# Patient Record
Sex: FEMALE | Race: WHITE | Employment: FULL TIME | URBAN - METROPOLITAN AREA
[De-identification: names, ages, dates, MRNs, and addresses within clinical notes are randomized per-mention and may not be internally consistent; named-entity substitution may affect disease eponyms.]

---

## 2017-02-06 DIAGNOSIS — I25.798 CORONARY ARTERY DISEASE INVOLVING OTHER CORONARY ARTERY BYPASS GRAFT WITH OTHER FORMS OF ANGINA PECTORIS (H): Primary | ICD-10-CM

## 2017-02-06 NOTE — TELEPHONE ENCOUNTER
metoprolol (TOPROL-XL) 50 MG      Last Written Prescription Date: 8/26/16  Last Fill Quantity: 90, # refills: 1    Last Office Visit with [facility], [facility] or [facility] prescribing provider:  8/4/16   Future Office Visit:        BP Readings from Last 3 Encounters:   08/04/16 110/72   06/28/16 110/60   04/05/16 100/60

## 2017-02-07 RX ORDER — METOPROLOL SUCCINATE 50 MG/1
50 TABLET, EXTENDED RELEASE ORAL DAILY
Qty: 90 TABLET | Refills: 1 | Status: SHIPPED | OUTPATIENT
Start: 2017-02-07 | End: 2017-04-06

## 2017-02-07 NOTE — TELEPHONE ENCOUNTER
Refill approved through [facility] protocol.  Donya Sheffield RN  [facility]  [phone]

## 2017-02-17 ENCOUNTER — THERAPY VISIT (OUTPATIENT)
Dept: PHYSICAL THERAPY | Facility: CLINIC | Age: 57
End: 2017-02-17
Payer: MEDICAID

## 2017-02-17 DIAGNOSIS — M25.562 ACUTE PAIN OF LEFT KNEE: ICD-10-CM

## 2017-02-17 DIAGNOSIS — M25.572 ACUTE LEFT ANKLE PAIN: ICD-10-CM

## 2017-02-17 DIAGNOSIS — R60.0 LOCALIZED EDEMA: Primary | ICD-10-CM

## 2017-02-17 PROCEDURE — 97110 THERAPEUTIC EXERCISES: CPT | Mod: GP | Performed by: PHYSICAL THERAPIST

## 2017-02-17 PROCEDURE — 97016 VASOPNEUMATIC DEVICE THERAPY: CPT | Mod: GP | Performed by: PHYSICAL THERAPIST

## 2017-02-17 PROCEDURE — 97140 MANUAL THERAPY 1/> REGIONS: CPT | Mod: GP | Performed by: PHYSICAL THERAPIST

## 2017-02-17 NOTE — MR AVS SNAPSHOT
"              After Visit Summary   2017    Carmen Nuñez    MRN: [id number]           Patient Information     Date Of Birth          1960        Visit Information        Provider Department      2017 3:30 PM Delfina Moses PT [facility]        Today's Diagnoses     Localized edema    -  1    Acute left ankle pain        Acute pain of left knee           Follow-ups after your visit        Who to contact     If you have questions or need follow up information about today's clinic visit or your schedule please contact [facility] directly at [phone].  Normal or non-critical lab and imaging results will be communicated to you by [employer], letter or phone within 4 business days after the clinic has received the results. If you do not hear from us within 7 days, please contact the clinic through [employer] or phone. If you have a critical or abnormal lab result, we will notify you by phone as soon as possible.  Submit refill requests through [employer] or call your pharmacy and they will forward the refill request to us. Please allow 3 business days for your refill to be completed.          Additional Information About Your Visit        MyChart Information     [employer] lets you send messages to your doctor, view your test results, renew your prescriptions, schedule appointments and more. To sign up, go to [email or web address] . Click on \"Log in\" on the left side of the screen, which will take you to the Welcome page. Then click on \"Sign up Now\" on the right side of the page.     You will be asked to enter the access code listed below, as well as some personal information. Please follow the directions to create your username and password.     Your access code is: [email or web address]  Expires: 2017  5:03 PM     Your access code will  in 90 days. If you need help or a new code, please call " your [facility] clinic or [phone].        Care EveryWhere ID     This is your Care EveryWhere ID. This could be used by other organizations to access your [facility] medical records  [id number]         Blood Pressure from Last 3 Encounters:   08/04/16 110/72   06/28/16 110/60   04/05/16 100/60    Weight from Last 3 Encounters:   08/04/16 94.8 kg (209 lb)   06/28/16 94.5 kg (208 lb 6.4 oz)   04/05/16 96.1 kg (211 lb 12.8 oz)              We Performed the Following     Manual Ther Tech, 1+Regions, EA 15 min     Therapeutic Exercises     Vasopneumatic Device        Primary Care Provider Office Phone # Fax #    Jackelyn Sims [phone]       [facility] [address] [place] [address] [address]  [place] [address]        Thank you!     Thank you for choosing [facility]  for your care. Our goal is always to provide you with excellent care. Hearing back from our patients is one way we can continue to improve our services. Please take a few minutes to complete the written survey that you may receive in the mail after your visit with us. Thank you!             Your Updated Medication List - Protect others around you: Learn how to safely use, store and throw away your medicines at www.disposemymeds.org.          This list is accurate as of: 2/17/17  5:03 PM.  Always use your most recent med list.                   Brand Name Dispense Instructions for use    amitriptyline 10 MG tablet    ELAVIL    90 tablet    Take 2 tablets by mouth At Bedtime.       aspirin 81 MG tablet      Take 81 mg by mouth daily       atorvastatin 20 MG tablet    LIPITOR    90 tablet    Take 1 tablet (20 mg) by mouth daily       calcium 500 MG Chew      1 per day       citalopram 40 MG tablet    celeXA    30 tablet    Take 1 tablet (40 mg) by mouth At Bedtime       clonazePAM 2 MG tablet    klonoPIN    60 tablet    Take 2 mg by mouth At Bedtime       desonide 0.05 % cream     DESOWEN    15 g    Apply sparingly to affected area three times daily for 14 days.       metoprolol 50 MG 24 hr tablet    TOPROL-XL    90 tablet    Take 1 tablet (50 mg) by mouth daily       MULTIVITAMIN TABS   OR      one tablet daily       nitroglycerin 0.4 MG sublingual tablet    NITROSTAT    25 tablet    Place 1 tablet (0.4 mg) under the tongue every 5 minutes as needed for chest pain       OMEGA-3 FISH OIL PO

## 2017-02-17 NOTE — LETTER
DEPARTMENT OF HEALTH AND HUMAN SERVICES  CENTERS FOR MEDICARE & MEDICAID SERVICES    PLAN/UPDATED PLAN OF PROGRESS FOR OUTPATIENT REHABILITATION    PATIENTS NAME:  Carmen Nuñez   : 1960  PROVIDER NUMPBER:    [id number]    HICN: [id number]    PROVIDER NAME: [facility]    MEDICAL RECORD NUMBER: [id number]     START OF CARE DATE:    10/14/16  TYPE:  PT    PRIMARY/TREATMENT DIAGNOSIS: (Pertinent Medical Diagnosis)     Acute left ankle pain  Acute pain of left knee  Localized edema    VISITS FROM START OF CARE:  Rxs Used: 10     PROGRESS  REPORT    Progress reporting period is from SOC to 2017.       SUBJECTIVE  CC: left knee and ankle pain onset 16.  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 L ankle medial pain, L knee anteromedial pain. Ankle had been hurting before this day but pain worsened after. No history of popping/clicking or feelings of knee giving way. Denies numbness/tingling. No pain into hip or back. Aggravated by walking (pain 7/10 after 2 blocks) or stairs (5/10 over 2 flights). Pain relieved with rest and ibuprofen. .    and reported as 3/10.          Special tests:  X-ray.      General health as reported by patient is fair.  Pertinent medical history includes:  Overweight, chemical dependency, heart problems, high blood pressure, depression, anemia and migraines.        Current occupation is  at [employer]-- involves prolonged standing and sitting, walking, carrying up to 25lb..      Past PT experience: Several episodes at [facility] EP in past  Recreational Activities/ Exercise:  No current routine  Subjective: Donya returns because of the L medial knee pain .  It mainly hurts with squatting and going up and down stairs.    Current pain level is 3/10  .     Initial Pain level: 7/10.   Changes in function:  Yes  (See Goal flowsheet attached for changes in current functional level)  Adverse reaction to treatment or activity: None  PATIENTS NAME:  Carmen Nuñez   : 1960              OBJECTIVE    Objective: Pain with squatting 3/10 and instruction given on trying to put weight at the heel with going up stairs to see if that helps to control the pain.     KNEE:  Lumbar Screen:   Fwd flx: full and nonpainful  Extension: full and nonpainful  Side bending: full and nonpainful  Rotation: full and nonpainful  Gait: Antalgic- Dec stance time L  Functional:   - Squat: Poor control, dec depth, ant weight shift. Pain 5/10.  - Balance: <5 sec SLS EO bilat    Swelling: Mild edema L anteromedial knee        AROM: (* indicates patient's pain)    R L     Hyperextension   4 5     Extension   0 0     Flexion   126 128*         Patellar tracking: Slight dec medial glide L vs. R. Normal patellar alignment bilat. No pain with mobility testing.                                PATIENTS NAME:  Carmen Nuñez   : 1960      Special tests:   L R   Sweep Test -    Anterior Drawer -    Lever Test     Posterior Drawer -    Lachman's -    Valgus 0 degrees -    Valgus 30 degrees -    Varus 0 degrees -    Varus 30 degrees -    Mich's +    Thessaley's     Lateral Compression -    Patellar Compression -    SLR -    Slump     Fat pad compression +        Palpation: TTP L anteromedial and anterolateral knee.  ANKLE:  ROM: Symmetric OKC DF and PF AROM/ nonpainful.   MMT: Ant tib: R 5/5, L4+/5  ED: R 5/5, L5/5  Post tib: R 4+/5, L 4/5*  Peroneals: R 5/5, L 5/5  Palpation: TTP L post tib tendon distal to med malleoli. No thickening of tendon palpable. Neg pain with palpation med malleoli or deltoid ligament.     ASSESSMENT/PLAN  Updated problem list and treatment plan: Diagnosis 1:  L medial knee pain  Pain -  self management, education and home program  Decreased strength - therapeutic exercise and therapeutic activities  Impaired muscle  "performance - neuro re-education  Decreased function - therapeutic activities  STG/LTGs have been met or progress has been made towards goals:  Yes (See Goal flow sheet completed today.)  Assessment of Progress: The patient's condition has exacerbated.  Self Management Plans:  Patient has been instructed in a home treatment program.  I have re-evaluated this patient and find that the nature, scope, duration and intensity of the therapy is appropriate for the medical condition of the patient.  Carmen continues to require the following intervention to meet STG and LTG's:  PT      PATIENTS NAME:  Carmen Nuñez   : 1960          Recommendations:  This patient would benefit from continued therapy.     Frequency:  1 X week, once daily  Duration:  for 4 weeks      Caregiver Signature/Credentials _____________________________ Date ________       Treating Provider: Naomi Moses, PT     I have reviewed and certified the need for these services and plan of treatment while under my care.        PHYSICIAN'S SIGNATURE:   _________________________________________  Date___________   Camryn Jett MD     Certification period:    17 to 17      Functional Level Progress Report: Please see attached \"Goal Flow sheet for Functional level.\"    ____X____ Continue Services or       ________ DC Services                Service dates: From  10/14/16 to present                         "

## 2017-03-30 NOTE — PROGRESS NOTES
Subjective:    HPI                    Objective:    System    Physical Exam    General     ROS    Assessment/Plan:      PROGRESS  REPORT    Progress reporting period is from SOC to 2-.       SUBJECTIVE  Subjective: Donya returns because of the L medial knee pain .  It mainly hurts with squatting and going up and down stairs.    Current pain level is 3/10  .     Initial Pain level: 7/10.   Changes in function:  Yes (See Goal flowsheet attached for changes in current functional level)  Adverse reaction to treatment or activity: None    OBJECTIVE    Objective: Pain with squatting 3/10 and instruction given on trying to put weight at the heel with going up stairs to see if that helps to control the pain.    ASSESSMENT/PLAN  Updated problem list and treatment plan: Diagnosis 1:  L medial knee pain  Pain -  self management, education and home program  Decreased strength - therapeutic exercise and therapeutic activities  Impaired muscle performance - neuro re-education  Decreased function - therapeutic activities  STG/LTGs have been met or progress has been made towards goals:  Yes (See Goal flow sheet completed today.)  Assessment of Progress: The patient's condition has exacerbated.  Self Management Plans:  Patient has been instructed in a home treatment program.  I have re-evaluated this patient and find that the nature, scope, duration and intensity of the therapy is appropriate for the medical condition of the patient.  Carmen continues to require the following intervention to meet STG and LTG's:  PT    Recommendations:  This patient would benefit from continued therapy.     Frequency:  1 X week, once daily  Duration:  for 4 weeks        Please refer to the daily flowsheet for treatment today, total treatment time and time spent performing 1:1 timed codes.

## 2017-03-30 NOTE — PROGRESS NOTES
Subjective:    HPI                    Objective:    System    Physical Exam    General     ROS    Assessment/Plan:      PROGRESS  REPORT    Progress reporting period is from 10/2017  to 2-.       SUBJECTIVE  Subjective: Donya returns because of the L medial knee pain .  It mainly hurts with squatting and going up and down stairs.    Current pain level is 3/10  .     Initial Pain level: 7/10.   Changes in function:  Yes (See Goal flowsheet attached for changes in current functional level)  Adverse reaction to treatment or activity: None    OBJECTIVE    Objective: Pain with squatting 3/10 and instruction given on trying to put weight at the heel with going up stairs to see if that helps to control the pain.    [facility] Initial Evaluation    Subjective:            CC: left knee and ankle pain onset 2 months prior in August 2016; pt works at [employer] 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 L ankle medial pain, L knee anteromedial pain. Ankle had been hurting before this day but pain worsened after. No history of popping/clicking or feelings of knee giving way. Denies numbness/tingling. No pain into hip or back. Aggravated by walking (pain 7/10 after 2 blocks) or stairs (5/10 over 2 flights). Pain relieved with rest and ibuprofen. .          and reported as 3/10.          Special tests:  X-ray.      General health as reported by patient is fair.  Pertinent medical history includes:  Overweight, chemical dependency, heart problems, high blood pressure, depression, anemia and migraines.        Current occupation is  at [employer]-- involves prolonged standing and sitting, walking, carrying up to 25lb..                 Past PT experience: Several episodes at [facility] in past    Recreational Activities/ Exercise:  No current routine.            Objective:    System    Physical  Exam    General     ROS   KNEE:    Lumbar Screen:   Fwd flx: full and nonpainful  Extension: full and nonpainful  Side bending: full and nonpainful  Rotation: full and nonpainful    Gait: Antalgic- Dec stance time L    Functional:   - Squat: Poor control, dec depth, ant weight shift. Pain 5/10.  - Balance: <5 sec SLS EO bilat    Swelling: Mild edema L anteromedial knee        AROM: (* indicates patient's pain)    R L     Hyperextension   4 5     Extension   0 0     Flexion   126 128*         Patellar tracking: Slight dec medial glide L vs. R. Normal patellar alignment bilat. No pain with mobility testing.    Special tests:   L R   Sweep Test -    Anterior Drawer -    Lever Test     Posterior Drawer -    Lachman's -    Valgus 0 degrees -    Valgus 30 degrees -    Varus 0 degrees -    Varus 30 degrees -    Mich's +    Thessaley's     Lateral Compression -    Patellar Compression -    SLR -    Slump     Fat pad compression +        Palpation: TTP L anteromedial and anterolateral knee.    ANKLE:  ROM: Symmetric OKC DF and PF AROM/ nonpainful.     MMT: Ant tib: R 5/5, L4+/5  ED: R 5/5, L5/5  Post tib: R 4+/5, L 4/5*  Peroneals: R 5/5, L 5/5    Palpation: TTP L post tib tendon distal to med malleoli. No thickening of tendon palpable. Neg pain with palpation med malleoli or deltoid ligament.           ASSESSMENT/PLAN  Updated problem list and treatment plan: Diagnosis 1:  L medial knee pain  Pain -  self management, education and home program  Decreased strength - therapeutic exercise and therapeutic activities  Impaired muscle performance - neuro re-education  Decreased function - therapeutic activities  STG/LTGs have been met or progress has been made towards goals:  Yes (See Goal flow sheet completed today.)  Assessment of Progress: The patient's condition has exacerbated.  Self Management Plans:  Patient has been instructed in a home treatment program.  I have re-evaluated this patient and find that the nature,  scope, duration and intensity of the therapy is appropriate for the medical condition of the patient.  Carmen continues to require the following intervention to meet STG and LTG's:  PT    Recommendations:  This patient would benefit from continued therapy.     Frequency:  1 X week, once daily  Duration:  for 4 weeks        Please refer to the daily flowsheet for treatment today, total treatment time and time spent performing 1:1 timed codes.

## 2017-03-31 ENCOUNTER — THERAPY VISIT (OUTPATIENT)
Dept: PHYSICAL THERAPY | Facility: CLINIC | Age: 57
End: 2017-03-31
Payer: MEDICAID

## 2017-03-31 DIAGNOSIS — R60.0 LOCALIZED EDEMA: ICD-10-CM

## 2017-03-31 DIAGNOSIS — M25.562 ACUTE PAIN OF LEFT KNEE: ICD-10-CM

## 2017-03-31 DIAGNOSIS — M25.572 ACUTE LEFT ANKLE PAIN: ICD-10-CM

## 2017-03-31 PROCEDURE — 97140 MANUAL THERAPY 1/> REGIONS: CPT | Mod: GP | Performed by: PHYSICAL THERAPIST

## 2017-03-31 PROCEDURE — 97110 THERAPEUTIC EXERCISES: CPT | Mod: GP | Performed by: PHYSICAL THERAPIST

## 2017-04-04 DIAGNOSIS — E78.5 HYPERLIPIDEMIA LDL GOAL <70: ICD-10-CM

## 2017-04-04 NOTE — TELEPHONE ENCOUNTER
atorvastatin     Last Written Prescription Date: 12/26/16  Last Fill Quantity: 90, # refills: 0  Last Office Visit with [facility] or [facility] prescribing provider: 8/4/16       Lab Results   Component Value Date    CHOL 151 12/31/2015     Lab Results   Component Value Date    HDL 30 12/31/2015     Lab Results   Component Value Date    LDL 68 12/31/2015     Lab Results   Component Value Date    TRIG 267 12/31/2015     Lab Results   Component Value Date    CHOLHDLRATIO 4.9 01/07/2015

## 2017-04-05 RX ORDER — ATORVASTATIN CALCIUM 20 MG/1
TABLET, FILM COATED ORAL
Qty: 30 TABLET | Refills: 0 | OUTPATIENT
Start: 2017-04-05

## 2017-04-06 ENCOUNTER — OFFICE VISIT (OUTPATIENT)
Dept: FAMILY MEDICINE | Facility: CLINIC | Age: 57
End: 2017-04-06
Payer: COMMERCIAL

## 2017-04-06 VITALS
SYSTOLIC BLOOD PRESSURE: 119 MMHG | OXYGEN SATURATION: 96 % | HEIGHT: 65 IN | TEMPERATURE: 97.5 F | HEART RATE: 61 BPM | BODY MASS INDEX: 36.49 KG/M2 | WEIGHT: 219 LBS | DIASTOLIC BLOOD PRESSURE: 75 MMHG

## 2017-04-06 DIAGNOSIS — Z00.00 ROUTINE GENERAL MEDICAL EXAMINATION AT A HEALTH CARE FACILITY: Primary | ICD-10-CM

## 2017-04-06 DIAGNOSIS — Z12.31 ENCOUNTER FOR SCREENING MAMMOGRAM FOR BREAST CANCER: ICD-10-CM

## 2017-04-06 DIAGNOSIS — R79.89 LFTS ABNORMAL: ICD-10-CM

## 2017-04-06 DIAGNOSIS — I25.798 CORONARY ARTERY DISEASE INVOLVING OTHER CORONARY ARTERY BYPASS GRAFT WITH OTHER FORMS OF ANGINA PECTORIS (H): ICD-10-CM

## 2017-04-06 DIAGNOSIS — F43.23 ADJUSTMENT DISORDER WITH MIXED ANXIETY AND DEPRESSED MOOD: ICD-10-CM

## 2017-04-06 DIAGNOSIS — E78.5 HYPERLIPIDEMIA LDL GOAL <70: ICD-10-CM

## 2017-04-06 PROBLEM — R60.0 LOCALIZED EDEMA: Status: RESOLVED | Noted: 2017-02-17 | Resolved: 2017-04-06

## 2017-04-06 LAB
ALBUMIN SERPL-MCNC: 3.7 G/DL (ref 3.4–5)
ALP SERPL-CCNC: 125 U/L (ref 40–150)
ALT SERPL W P-5'-P-CCNC: 55 U/L (ref 0–50)
ANION GAP SERPL CALCULATED.3IONS-SCNC: 7 MMOL/L (ref 3–14)
AST SERPL W P-5'-P-CCNC: 29 U/L (ref 0–45)
BILIRUB SERPL-MCNC: 0.4 MG/DL (ref 0.2–1.3)
BUN SERPL-MCNC: 17 MG/DL (ref 7–30)
CALCIUM SERPL-MCNC: 8.8 MG/DL (ref 8.5–10.1)
CHLORIDE SERPL-SCNC: 108 MMOL/L (ref 94–109)
CHOLEST SERPL-MCNC: 153 MG/DL
CO2 SERPL-SCNC: 27 MMOL/L (ref 20–32)
CREAT SERPL-MCNC: 0.61 MG/DL (ref 0.52–1.04)
ERYTHROCYTE [DISTWIDTH] IN BLOOD BY AUTOMATED COUNT: 13.3 % (ref 10–15)
GFR SERPL CREATININE-BSD FRML MDRD: ABNORMAL ML/MIN/1.7M2
GLUCOSE SERPL-MCNC: 101 MG/DL (ref 70–99)
HCT VFR BLD AUTO: 41.1 % (ref 35–47)
HDLC SERPL-MCNC: 37 MG/DL
HGB BLD-MCNC: 13.5 G/DL (ref 11.7–15.7)
LDLC SERPL CALC-MCNC: 67 MG/DL
MCH RBC QN AUTO: 28.4 PG (ref 26.5–33)
MCHC RBC AUTO-ENTMCNC: 32.8 G/DL (ref 31.5–36.5)
MCV RBC AUTO: 86 FL (ref 78–100)
NONHDLC SERPL-MCNC: 116 MG/DL
PLATELET # BLD AUTO: 255 10E9/L (ref 150–450)
POTASSIUM SERPL-SCNC: 3.8 MMOL/L (ref 3.4–5.3)
PROT SERPL-MCNC: 7.7 G/DL (ref 6.8–8.8)
RBC # BLD AUTO: 4.76 10E12/L (ref 3.8–5.2)
SODIUM SERPL-SCNC: 142 MMOL/L (ref 133–144)
TRIGL SERPL-MCNC: 243 MG/DL
TSH SERPL DL<=0.005 MIU/L-ACNC: 2.95 MU/L (ref 0.4–4)
WBC # BLD AUTO: 6.4 10E9/L (ref 4–11)

## 2017-04-06 PROCEDURE — 99213 OFFICE O/P EST LOW 20 MIN: CPT | Mod: 25 | Performed by: FAMILY MEDICINE

## 2017-04-06 PROCEDURE — 36415 COLL VENOUS BLD VENIPUNCTURE: CPT | Performed by: FAMILY MEDICINE

## 2017-04-06 PROCEDURE — 84443 ASSAY THYROID STIM HORMONE: CPT | Performed by: FAMILY MEDICINE

## 2017-04-06 PROCEDURE — 85027 COMPLETE CBC AUTOMATED: CPT | Performed by: FAMILY MEDICINE

## 2017-04-06 PROCEDURE — 80061 LIPID PANEL: CPT | Performed by: FAMILY MEDICINE

## 2017-04-06 PROCEDURE — 80053 COMPREHEN METABOLIC PANEL: CPT | Performed by: FAMILY MEDICINE

## 2017-04-06 PROCEDURE — 99396 PREV VISIT EST AGE 40-64: CPT | Performed by: FAMILY MEDICINE

## 2017-04-06 RX ORDER — CITALOPRAM HYDROBROMIDE 40 MG/1
40 TABLET ORAL AT BEDTIME
Qty: 90 TABLET | Refills: 1 | Status: SHIPPED | OUTPATIENT
Start: 2017-04-06 | End: 2017-11-03

## 2017-04-06 RX ORDER — ATORVASTATIN CALCIUM 20 MG/1
20 TABLET, FILM COATED ORAL DAILY
Qty: 90 TABLET | Refills: 3 | Status: SHIPPED | OUTPATIENT
Start: 2017-04-06 | End: 2018-04-24

## 2017-04-06 RX ORDER — METOPROLOL SUCCINATE 50 MG/1
50 TABLET, EXTENDED RELEASE ORAL DAILY
Qty: 90 TABLET | Refills: 3 | Status: SHIPPED | OUTPATIENT
Start: 2017-04-06 | End: 2018-04-18

## 2017-04-06 ASSESSMENT — ANXIETY QUESTIONNAIRES
5. BEING SO RESTLESS THAT IT IS HARD TO SIT STILL: NOT AT ALL
2. NOT BEING ABLE TO STOP OR CONTROL WORRYING: NOT AT ALL
7. FEELING AFRAID AS IF SOMETHING AWFUL MIGHT HAPPEN: NOT AT ALL
GAD7 TOTAL SCORE: 0
1. FEELING NERVOUS, ANXIOUS, OR ON EDGE: NOT AT ALL
3. WORRYING TOO MUCH ABOUT DIFFERENT THINGS: NOT AT ALL
6. BECOMING EASILY ANNOYED OR IRRITABLE: NOT AT ALL

## 2017-04-06 ASSESSMENT — PATIENT HEALTH QUESTIONNAIRE - PHQ9: 5. POOR APPETITE OR OVEREATING: NOT AT ALL

## 2017-04-06 NOTE — MR AVS SNAPSHOT
After Visit Summary   4/6/2017    Carmen Nuñez    MRN: [id number]           Patient Information     Date Of Birth          1960        Visit Information        Provider Department      4/6/2017 9:20 AM Camryn Jett MD [facility] [place]        Today's Diagnoses     Routine general medical examination at a health care facility    -  1    Coronary artery disease involving other coronary artery bypass graft with other forms of angina pectoris (H)        Hyperlipidemia LDL goal <70        Adjustment disorder with mixed anxiety and depressed mood        LFTs abnormal        Encounter for screening mammogram for breast cancer          Care Instructions      Preventive Health Recommendations  Female Ages 50 - 64    Yearly exam: See your health care provider every year in order to  o Review health changes.   o Discuss preventive care.    o Review your medicines if your doctor has prescribed any.      Get a Pap test every three years (unless you have an abnormal result and your provider advises testing more often).    If you get Pap tests with HPV test, you only need to test every 5 years, unless you have an abnormal result.     You do not need a Pap test if your uterus was removed (hysterectomy) and you have not had cancer.    You should be tested each year for STDs (sexually transmitted diseases) if you're at risk.     Have a mammogram every 1 to 2 years.    Have a colonoscopy at age 50, or have a yearly FIT test (stool test). These exams screen for colon cancer.      Have a cholesterol test every 5 years, or more often if advised.    Have a diabetes test (fasting glucose) every three years. If you are at risk for diabetes, you should have this test more often.     If you are at risk for osteoporosis (brittle bone disease), think about having a bone density scan (DEXA).    Shots: Get a flu shot each year. Get a tetanus shot every 10 years.    Nutrition:     Eat at least 5 servings of  fruits and vegetables each day.    Eat whole-grain bread, whole-wheat pasta and brown rice instead of white grains and rice.    Talk to your provider about Calcium and Vitamin D.     Lifestyle    Exercise at least 150 minutes a week (30 minutes a day, 5 days a week). This will help you control your weight and prevent disease.    Limit alcohol to one drink per day.    No smoking.     Wear sunscreen to prevent skin cancer.     See your dentist every six months for an exam and cleaning.    See your eye doctor every 1 to 2 years.          Follow-ups after your visit        Additional Services     CARDIOLOGY EVAL ADULT REFERRAL       Your provider has referred you to:  [facility] [place] [facility] [place] [phone]   [email or web address]    Please be aware that coverage of these services is subject to the terms and limitations of your health insurance plan.  Call member services at your health plan with any benefit or coverage questions.      Type of Referral:  Cardiology Follow Up    Timeframe requested:  Within 1 month    Please bring the following to your appointment:  >>   Any x-rays, CTs or MRIs which have been performed.  Contact the facility where they were done to arrange for  prior to your scheduled appointment.    >>   List of current medications  >>   This referral request   >>   Any documents/labs given to you for this referral                  Your next 10 appointments already scheduled     Apr 07, 2017  3:50 PM CDT   MADHU Extremity with Valerio Temple, PT   [facility] [place] [facility] ([facility] [place])    [address] [place] [address]  [address]  [place] [id number] [phone]            Apr 14, 2017  3:30 PM CDT   MADHU Extremity with Agus Maldonado, PT   [facility] ([facility] [place])    [address] [place] [address]  [address]  [place] [address]  "  [phone]              Future tests that were ordered for you today     Open Future Orders        Priority Expected Expires Ordered    *MA Screening Digital Bilateral Routine  2018            Who to contact     If you have questions or need follow up information about today's clinic visit or your schedule please contact [facility] [place] directly at [phone].  Normal or non-critical lab and imaging results will be communicated to you by MyChart, letter or phone within 4 business days after the clinic has received the results. If you do not hear from us within 7 days, please contact the clinic through Wistonehart or phone. If you have a critical or abnormal lab result, we will notify you by phone as soon as possible.  Submit refill requests through Crelow or call your pharmacy and they will forward the refill request to us. Please allow 3 business days for your refill to be completed.          Additional Information About Your Visit        WistoneharOpenStudy Information     Crelow lets you send messages to your doctor, view your test results, renew your prescriptions, schedule appointments and more. To sign up, go to [email or web address] . Click on \"Log in\" on the left side of the screen, which will take you to the Welcome page. Then click on \"Sign up Now\" on the right side of the page.     You will be asked to enter the access code listed below, as well as some personal information. Please follow the directions to create your username and password.     Your access code is: [email or web address]  Expires: 2017  6:03 PM     Your access code will  in 90 days. If you need help or a new code, please call your [facility] clinic or [phone].        Care EveryWhere ID     This is your Care EveryWhere ID. This could be used by other organizations to access your [facility] medical records  [id number]        Your Vitals Were     Pulse Temperature Height Pulse Oximetry BMI (Body Mass Index)       61 " "97.5  F (36.4  C) (Tympanic) 5' 5\" (1.651 m) 96% 36.44 kg/m2        Blood Pressure from Last 3 Encounters:   04/06/17 119/75   08/04/16 110/72   06/28/16 110/60    Weight from Last 3 Encounters:   04/06/17 219 lb (99.3 kg)   08/04/16 209 lb (94.8 kg)   06/28/16 208 lb 6.4 oz (94.5 kg)              We Performed the Following     CARDIOLOGY EVAL ADULT REFERRAL     CBC with platelets     Comprehensive metabolic panel     HPV High Risk Types DNA Cervical     Lipid Profile     Pap imaged thin layer screen with HPV - recommended age 30 - 65 years (select HPV order below)     TSH with free T4 reflex          Where to get your medicines      These medications were sent to [id number] - [place], [place] - [address]  [address] [place] [address]     Phone:  [phone]     atorvastatin 20 MG tablet    citalopram 40 MG tablet    metoprolol 50 MG 24 hr tablet          Primary Care Provider Office Phone # Fax #    Jackelyn Sims [phone]       [facility] [address] [place] [address] [address]  [place] [address]        Thank you!     Thank you for choosing [facility]  for your care. Our goal is always to provide you with excellent care. Hearing back from our patients is one way we can continue to improve our services. Please take a few minutes to complete the written survey that you may receive in the mail after your visit with us. Thank you!             Your Updated Medication List - Protect others around you: Learn how to safely use, store and throw away your medicines at www.disposemymeds.org.          This list is accurate as of: 4/6/17 10:00 AM.  Always use your most recent med list.                   Brand Name Dispense Instructions for use    amitriptyline 10 MG tablet    ELAVIL    90 tablet    Take 2 tablets by mouth At Bedtime.       aspirin 81 MG tablet      Take 81 mg by mouth daily       atorvastatin 20 MG tablet    LIPITOR    90 tablet    Take 1 tablet " (20 mg) by mouth daily       calcium 500 MG Chew      1 per day       citalopram 40 MG tablet    celeXA    90 tablet    Take 1 tablet (40 mg) by mouth At Bedtime       clonazePAM 2 MG tablet    klonoPIN    60 tablet    Take 2 mg by mouth At Bedtime       desonide 0.05 % cream    DESOWEN    15 g    Apply sparingly to affected area three times daily for 14 days.       metoprolol 50 MG 24 hr tablet    TOPROL-XL    90 tablet    Take 1 tablet (50 mg) by mouth daily       MULTIVITAMIN TABS   OR      one tablet daily       nitroglycerin 0.4 MG sublingual tablet    NITROSTAT    25 tablet    Place 1 tablet (0.4 mg) under the tongue every 5 minutes as needed for chest pain       OMEGA-3 FISH OIL PO

## 2017-04-06 NOTE — NURSING NOTE
"Chief Complaint   Patient presents with     Physical       Initial /75 (BP Location: Left arm, Patient Position: Chair, Cuff Size: Adult Large)  Pulse 61  Temp 97.5  F (36.4  C) (Tympanic)  Ht 5' 5\" (1.651 m)  Wt 219 lb (99.3 kg)  SpO2 96%  BMI 36.44 kg/m2 Estimated body mass index is 36.44 kg/(m^2) as calculated from the following:    Height as of this encounter: 5' 5\" (1.651 m).    Weight as of this encounter: 219 lb (99.3 kg).  Medication Reconciliation: complete  "

## 2017-04-06 NOTE — LETTER
[facility] [place]          [address] [place] [address]                            [phone]  Fax: [phone]  April 6, 2017     Carmen Nuñez  [address] [facility] [address]      Dear Carmen,    I have reviewed your recent labs. Here are the results:    -Liver test ALT has improved as compared to before. Recommending a recheck in 6 months.   -Kidney function (GFR) is normal.  -Sodium is normal.  -Potassium is normal.  -Glucose is slight elevated and may be sign of early diabetes (prediabetes). ADVISE:: low carbohydrate diet, exercise, try to lose weight (if necessary) and recheck glucose in 6 months. (GLU,A1C, DX: prediabetes)  -LDL(bad) cholesterol level is normal.  -HDL(good) cholesterol level is low and your triglycerides are elevated which can increase your heart disease risk.  A diet high in fat and simple carbohydrates, genetics and being overweight can contribute to this.   ADVISE:Resume Atorvastatin.  a regular exercise program with at least 30 minutes of aerobic exercise 3-4 days/week ( 45 minutes 4-6 days/week if weight loss needed), and omega-3 fatty acids (fish oil) 2891-7605 mg daily are helpful to improve this.  Rechecking your cholesterol in 6 months is recommended.   -TSH (thyroid stimulating hormone) level is normal which indicates normal thyroid function.  -Normal red blood cell (hgb) levels, normal white blood cell count and normal platelet levels.    Results for orders placed or performed in visit on 04/06/17   Lipid Profile   Result Value Ref Range    Cholesterol 153 <200 mg/dL    Triglycerides 243 (H) <150 mg/dL    HDL Cholesterol 37 (L) >49 mg/dL    LDL Cholesterol Calculated 67 <100 mg/dL    Non HDL Cholesterol 116 <130 mg/dL   Comprehensive metabolic panel   Result Value Ref Range    Sodium 142 133 - 144 mmol/L    Potassium 3.8 3.4 - 5.3 mmol/L    Chloride 108 94 - 109 mmol/L    Carbon  Dioxide 27 20 - 32 mmol/L    Anion Gap 7 3 - 14 mmol/L    Glucose 101 (H) 70 - 99 mg/dL    Urea Nitrogen 17 7 - 30 mg/dL    Creatinine 0.61 0.52 - 1.04 mg/dL    GFR Estimate >90  Non  GFR Calc   >60 mL/min/1.7m2    GFR Estimate If Black >90   GFR Calc   >60 mL/min/1.7m2    Calcium 8.8 8.5 - 10.1 mg/dL    Bilirubin Total 0.4 0.2 - 1.3 mg/dL    Albumin 3.7 3.4 - 5.0 g/dL    Protein Total 7.7 6.8 - 8.8 g/dL    Alkaline Phosphatase 125 40 - 150 U/L    ALT 55 (H) 0 - 50 U/L    AST 29 0 - 45 U/L   TSH with free T4 reflex   Result Value Ref Range    TSH 2.95 0.40 - 4.00 mU/L   CBC with platelets   Result Value Ref Range    WBC 6.4 4.0 - 11.0 10e9/L    RBC Count 4.76 3.8 - 5.2 10e12/L    Hemoglobin 13.5 11.7 - 15.7 g/dL    Hematocrit 41.1 35.0 - 47.0 %    MCV 86 78 - 100 fl    MCH 28.4 26.5 - 33.0 pg    MCHC 32.8 31.5 - 36.5 g/dL    RDW 13.3 10.0 - 15.0 %    Platelet Count 255 150 - 450 10e9/L       Thank you for choosing [facility].  We appreciate the opportunity to serve you and look forward to supporting your healthcare needs in the future.    If you have any questions or concerns, please call me or my staff at [phone].      Sincerely,      Johnie Cowan M.D.
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
## 2017-04-07 ENCOUNTER — THERAPY VISIT (OUTPATIENT)
Dept: PHYSICAL THERAPY | Facility: CLINIC | Age: 57
End: 2017-04-07
Payer: COMMERCIAL

## 2017-04-07 DIAGNOSIS — M25.562 ACUTE PAIN OF LEFT KNEE: ICD-10-CM

## 2017-04-07 DIAGNOSIS — M25.572 ACUTE LEFT ANKLE PAIN: Primary | ICD-10-CM

## 2017-04-07 PROCEDURE — 29530 STRAPPING OF KNEE: CPT | Mod: GP | Performed by: PHYSICAL THERAPIST

## 2017-04-07 PROCEDURE — 97530 THERAPEUTIC ACTIVITIES: CPT | Mod: GP | Performed by: PHYSICAL THERAPIST

## 2017-04-07 PROCEDURE — 97140 MANUAL THERAPY 1/> REGIONS: CPT | Mod: GP | Performed by: PHYSICAL THERAPIST

## 2017-04-07 ASSESSMENT — ANXIETY QUESTIONNAIRES: GAD7 TOTAL SCORE: 0

## 2017-04-07 ASSESSMENT — PATIENT HEALTH QUESTIONNAIRE - PHQ9: SUM OF ALL RESPONSES TO PHQ QUESTIONS 1-9: 3

## 2017-04-14 ENCOUNTER — THERAPY VISIT (OUTPATIENT)
Dept: PHYSICAL THERAPY | Facility: CLINIC | Age: 57
End: 2017-04-14
Payer: COMMERCIAL

## 2017-04-14 ENCOUNTER — HOSPITAL ENCOUNTER (OUTPATIENT)
Dept: MAMMOGRAPHY | Facility: CLINIC | Age: 57
Discharge: HOME | End: 2017-04-14
Attending: FAMILY MEDICINE | Admitting: FAMILY MEDICINE
Payer: COMMERCIAL

## 2017-04-14 DIAGNOSIS — M25.562 ACUTE PAIN OF LEFT KNEE: ICD-10-CM

## 2017-04-14 DIAGNOSIS — M25.572 ACUTE LEFT ANKLE PAIN: ICD-10-CM

## 2017-04-14 DIAGNOSIS — Z12.31 ENCOUNTER FOR SCREENING MAMMOGRAM FOR BREAST CANCER: ICD-10-CM

## 2017-04-14 PROCEDURE — 97112 NEUROMUSCULAR REEDUCATION: CPT | Mod: GP | Performed by: PHYSICAL THERAPIST

## 2017-04-14 PROCEDURE — G0202 SCR MAMMO BI INCL CAD: HCPCS

## 2017-04-14 PROCEDURE — 97110 THERAPEUTIC EXERCISES: CPT | Mod: GP | Performed by: PHYSICAL THERAPIST

## 2017-04-19 ENCOUNTER — PRE VISIT (OUTPATIENT)
Dept: CARDIOLOGY | Facility: CLINIC | Age: 57
End: 2017-04-19

## 2017-04-19 DIAGNOSIS — I36.9 TRICUSPID VALVE DISORDERS, NON-RHEUMATIC: ICD-10-CM

## 2017-04-19 DIAGNOSIS — I42.9 CARDIOMYOPATHY (H): ICD-10-CM

## 2017-04-26 ENCOUNTER — OFFICE VISIT (OUTPATIENT)
Dept: CARDIOLOGY | Facility: CLINIC | Age: 57
End: 2017-04-26
Attending: FAMILY MEDICINE
Payer: COMMERCIAL

## 2017-04-26 VITALS
SYSTOLIC BLOOD PRESSURE: 110 MMHG | DIASTOLIC BLOOD PRESSURE: 78 MMHG | HEART RATE: 72 BPM | HEIGHT: 65 IN | WEIGHT: 218 LBS | BODY MASS INDEX: 36.32 KG/M2

## 2017-04-26 DIAGNOSIS — I25.10 CORONARY ARTERY DISEASE INVOLVING NATIVE CORONARY ARTERY OF NATIVE HEART WITHOUT ANGINA PECTORIS: ICD-10-CM

## 2017-04-26 DIAGNOSIS — Z95.1 POSTSURGICAL AORTOCORONARY BYPASS STATUS: ICD-10-CM

## 2017-04-26 DIAGNOSIS — H81.10 BPPV (BENIGN PAROXYSMAL POSITIONAL VERTIGO), UNSPECIFIED LATERALITY: ICD-10-CM

## 2017-04-26 DIAGNOSIS — E78.5 HYPERLIPIDEMIA LDL GOAL <70: Primary | ICD-10-CM

## 2017-04-26 DIAGNOSIS — I42.9 CARDIOMYOPATHY (H): ICD-10-CM

## 2017-04-26 PROCEDURE — 99214 OFFICE O/P EST MOD 30 MIN: CPT | Performed by: INTERNAL MEDICINE

## 2017-04-26 PROCEDURE — 93000 ELECTROCARDIOGRAM COMPLETE: CPT | Performed by: INTERNAL MEDICINE

## 2017-04-26 RX ORDER — MECLIZINE HYDROCHLORIDE 25 MG/1
25 TABLET ORAL 3 TIMES DAILY PRN
Qty: 30 TABLET | Refills: 0 | Status: CANCELLED | OUTPATIENT
Start: 2017-04-26

## 2017-04-26 NOTE — PROGRESS NOTES
HPI and Plan:   See dictation    Orders Placed This Encounter   Procedures     EKG 12-lead complete w/read - Clinics (performed today)       No orders of the defined types were placed in this encounter.      There are no discontinued medications.      Encounter Diagnoses   Name Primary?     Hyperlipidemia LDL goal <70 Yes     Coronary artery disease      Cardiomyopathy (H)      BPPV (benign paroxysmal positional vertigo), unspecified laterality        CURRENT MEDICATIONS:  Current Outpatient Prescriptions   Medication Sig Dispense Refill     metoprolol (TOPROL-XL) 50 MG 24 hr tablet Take 1 tablet (50 mg) by mouth daily 90 tablet 3     atorvastatin (LIPITOR) 20 MG tablet Take 1 tablet (20 mg) by mouth daily 90 tablet 3     citalopram (CELEXA) 40 MG tablet Take 1 tablet (40 mg) by mouth At Bedtime 90 tablet 1     Omega-3 Fatty Acids (OMEGA-3 FISH OIL PO)        nitroglycerin (NITROSTAT) 0.4 MG SL tablet Place 1 tablet (0.4 mg) under the tongue every 5 minutes as needed for chest pain 25 tablet 1     aspirin 81 MG tablet Take 81 mg by mouth daily       ClonAZEPAM (KLONOPIN) 2 MG tablet Take 2 mg by mouth At Bedtime  60 tablet 0     amitriptyline (ELAVIL) 10 MG tablet Take 2 tablets by mouth At Bedtime. 90 tablet 2     CALCIUM 500 MG PO CHEW 1 per day       MULTIVITAMIN TABS   OR one tablet daily       desonide (DESOWEN) 0.05 % cream Apply sparingly to affected area three times daily for 14 days. (Patient not taking: Reported on 4/26/2017) 15 g 0       ALLERGIES     Allergies   Allergen Reactions     Augmentin GI Disturbance     can take penicillin     Dye [Contrast Dye] Hives     hydroxizine Hcl does help prevent hives.       PAST MEDICAL HISTORY:  Past Medical History:   Diagnosis Date     Abnormal glandular Papanicolaou smear of cervix 1985    colposcopy and cryotherapy     Acute gastritis without mention of hemorrhage 10/05    NSAID related     Acute non-ST-elevation MI following previous MI (H)      Two  drug-eluting stents placed to the LAD.      Allergic rhinitis due to other allergen     molds, pets, grasses, pollen - on immunotherapy     Calculus of gallbladder without mention of cholecystitis or obstruction 1985    cholecystectomy     Cardiomyopathy (H)      Carpal tunnel syndrome     rt s/p ortho eval and EMG     Coronary artery disease     CABG 2011: LIMA to LAD, SVG to OM, SVG to RCA, cardiac cath 2010: RAMOS x2 to LAD     Depressive disorder, not elsewhere classified      Excessive or frequent menstruation     vaginal hysterectomy, ovaries remain     Family history of malignant neoplasm of breast     father     Headache(784.0)     frontal     Hyperlipidaemia      Hypertension      Intramural leiomyoma of uterus      Iron deficiency anemia, unspecified 10/05    s/p EGD, colonoscopy, SBFT pos erosive gastritis nsaid related     Mixed hyperlipidemia      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 Ramsay     Supervision of other normal pregnancy      - vaginal     Tricuspid valve disorders, non-rheumatic     per  echo, mild-mod (1-2+) TR       PAST SURGICAL HISTORY:  Past Surgical History:   Procedure Laterality Date     C APPENDECTOMY  1985    incidental     C NONSPECIFIC PROCEDURE      colposcopy/cryotherapy of cervix for abnl pap smear     C NONSPECIFIC PROCEDURE      clipping ruptured cerebral aneurysm     C NONSPECIFIC PROCEDURE  10/05    UGI with SBFT nl     C VAGINAL HYSTERECTOMY      partial ovaries remain     CHOLECYSTECTOMY, OPEN  1985     CORONARY ANGIOGRAPHY ADULT ORDER  1-20-11      New critical left main stenosis  of 80-90% , CABG recommended     CORONARY ARTERY BYPASS  1-24-11    X3 1/11 LIMA to LAD, SVG to OM, SVG aorta to distal RCA     CRANIOTOMY      resection on brai aneurysm at age 31      CRANIOTOMY, REPAIR ANEURYSM, COMBINED      at age 31      HC COLONOSCOPY THRU STOMA, DIAGNOSTIC  10/05    bx of terminal ileal nodules benign     HC UGI ENDOSCOPY DIAG W OR W/O BRUSH/WASH  10/05    erosive gastritis, neg sprue bx     HEART CATH, ANGIOPLASTY  Nov 2010    intracoronary stenting in the mid LAD.       HYSTERECTOMY, PAP NO LONGER INDICATED       HYSTERECTOMY, PAP NO LONGER INDICATED       TONSILLECTOMY      tonsillectomy       FAMILY HISTORY:  Family History   Problem Relation Age of Onset     C.A.D. Mother      HEART DISEASE Mother      heart disease     CEREBROVASCULAR DISEASE Mother      age 70s     DIABETES Mother      type 2     Hypertension Mother      Lipids Mother      Myocardial Infarction Mother      CEREBROVASCULAR DISEASE Father      age mid 70s     CANCER Father      breast     Breast Cancer Father      onset age 72     Alcohol/Drug Father      Cardiovascular Father      abdominal aortic aneurysm, smoker     Myocardial Infarction Brother      2        SOCIAL HISTORY:  Social History     Social History     Marital status:      Spouse name: N/A     Number of children: 1     Years of education: 12     Occupational History      [employer]     and delivers trays to customers     Social History Main Topics     Smoking status: Former Smoker     Packs/day: 2.00     Years: 22.00     Types: Cigarettes     Quit date: 1/1/1994     Smokeless tobacco: Never Used     Alcohol use No      Comment: quit 1994     Drug use: No      Comment: quit amphetamines 1994     Sexual activity: Not Currently     Other Topics Concern      Service No     Blood Transfusions No     Caffeine Concern No      no  cofee,diet mountain dew 3-4 cans daily     Occupational Exposure No     Hobby Hazards No     Sleep Concern No      "Stress Concern No     Weight Concern Yes     Special Diet No     supplement plus cheese     Back Care Yes     Exercise Yes     3-4 times weekly,treadmill,bike,weights-workout 3 hours     Bike Helmet No     Seat Belt Yes     Self-Exams Yes     Social History Narrative    Lives with .  Daughter age 28.               Review of Systems:  Skin:  Negative       Eyes:  Positive for glasses    ENT:  Positive for nasal congestion;vertigo    Respiratory:  Negative       Cardiovascular:  Negative for;palpitations;chest pain;edema;syncope or near-syncope;cyanosis;exercise intolerance;fatigue;lightheadedness;dizziness      Gastroenterology: Negative      Genitourinary:  Negative      Musculoskeletal:  Positive for arthritis pain left knee  Neurologic:  Negative      Psychiatric:  Positive for depression lost  2 years ago  Heme/Lymph/Imm:  Negative      Endocrine:  Positive for hot flashes      Physical Exam:  Vitals: /78 (BP Location: Left arm, Cuff Size: Adult Regular)  Pulse 72  Ht 1.651 m (5' 5\")  Wt 98.9 kg (218 lb)  BMI 36.28 kg/m2    Constitutional:           Skin:           Head:           Eyes:           ENT:           Neck:           Chest:             Cardiac:                    Abdomen:           Vascular:                                          Extremities and Back:                 Neurological:                 CC  Camryn Jett MD  [address] [place] [address]                  "

## 2017-04-26 NOTE — LETTER
4/26/2017    Camryn Jett MD  [facility] [place]  [address] [place] [address] [place] [address]    RE: Carmen Nuñez       Dear Colleague,    I had the pleasure of seeing Carmen Nuñez in the [facility].    REFERRING PHYSICIAN:  Dr. Camryn Jett.      Ms. Nuñez is a pleasant 57-year-old female with a history of coronary disease.  She had a previous coronary artery bypass grafting in 2011 with a LIMA to an LAD, vein graft to an obtuse marginal and vein graft to the distal right coronary artery.  At the time she had presented with heart attack and her primary symptom was pain between her shoulder blades.  She did not have typical symptoms.  She had no complications following her bypass surgery.  She is being treated for this including hypertension and dyslipidemia as well.  She had previously been seen by Dr. Barton for several years.  Her last visit to our clinic was with Dr. Yeh for preop cardiac clearance for a right rotator cuff repair.  She had a stress perfusion imaging study in 2014 prior to that surgery which was normal and she underwent a successful rotator cuff repair without any cardiac or other complications.  She is here mainly to establish or reestablish cardiac care.  She is not having any symptoms right now that she is concerned about.  Overall, she feels very well.  She has not had any recurrent symptoms of upper back pain, difficulties breathing or exercise intolerance.  She does try to watch her diet.  She works at [employer] and tries to bring her own meals when she is working.  She also admits she does try to exercise, but it is intermittent and not on a regular basis.  Her last cholesterol profile was at the beginning of this month.  Total cholesterol was 153, HDL 37, LDL 67, triglycerides 243.  This has been her pattern with HDL deficiency and hypertriglyceridemia for several years now.  She is on moderate intensity statin therapy and  compliant with this.  She is being monitored for borderline diabetes and has a family history of diabetes.  We did perform an electrocardiogram in office today, which I have reviewed.  This demonstrates a normal sinus rhythm.  She does have poor R-wave progression in the anterior precordial leads with ST and T-wave abnormalities, mainly in the anterior and lateral leads, but when compared to her previous from 2014 these findings looks very similar.      PHYSICAL EXAMINATION:   VITAL SIGNS:  On exam today, her blood pressure is 110/78, pulse is 72, weight 218.  Body mass index of 36.  It looks like she has gained about 9 pounds over the last year.   NECK:  Carotid upstrokes are brisk without bruit.   CARDIOVASCULAR:  Tones are regular.  I do not appreciate a murmur, gallop or rub.   LUNGS:  Clear posteriorly.   EXTREMITIES:  She has palpable pulses in the distal extremities without peripheral edema.     Outpatient Encounter Prescriptions as of 4/26/2017   Medication Sig Dispense Refill     metoprolol (TOPROL-XL) 50 MG 24 hr tablet Take 1 tablet (50 mg) by mouth daily 90 tablet 3     atorvastatin (LIPITOR) 20 MG tablet Take 1 tablet (20 mg) by mouth daily 90 tablet 3     citalopram (CELEXA) 40 MG tablet Take 1 tablet (40 mg) by mouth At Bedtime 90 tablet 1     Omega-3 Fatty Acids (OMEGA-3 FISH OIL PO)        nitroglycerin (NITROSTAT) 0.4 MG SL tablet Place 1 tablet (0.4 mg) under the tongue every 5 minutes as needed for chest pain 25 tablet 1     aspirin 81 MG tablet Take 81 mg by mouth daily       ClonAZEPAM (KLONOPIN) 2 MG tablet Take 2 mg by mouth At Bedtime  60 tablet 0     amitriptyline (ELAVIL) 10 MG tablet Take 2 tablets by mouth At Bedtime. 90 tablet 2     CALCIUM 500 MG PO CHEW 1 per day       MULTIVITAMIN TABS   OR one tablet daily       desonide (DESOWEN) 0.05 % cream Apply sparingly to affected area three times daily for 14 days. (Patient not taking: Reported on 6/19/2017) 15 g 0     No  facility-administered encounter medications on file as of 4/26/2017.       SUMMARY:  Ms. Nuñez is a pleasant 57-year-old female with a history of coronary disease, previous 3-vessel CABG, preserved LV systolic function, obesity, hypertension and mixed hyperlipidemia.  She seems stable from a cardiac perspective.  She has had atypical symptoms in the past.  She is currently not symptomatic and she is on appropriate medical therapy for prevention of future cardiovascular events.  She did have a stress perfusion imaging study in 10/2014.  At that time, she had no evidence of ongoing ischemia and her ejection fraction was gated at 66%.  I will recommend continued current medical management and I have encouraged her to stay away from fried foods and fatty foods as well as highly processed carbohydrates.  We also talked about improving on her consistency with exercise.  This will be both important for her borderline diabetes as well as her heart disease.  Given that she had atypical symptoms, I will recommend a stress testing every 3-5 years as well.  I am happy to continue to follow her on an annual basis or as needed.  Please feel free to contact me with any questions you have in regards to her care.                   Again, thank you for allowing me to participate in the care of your patient.      Sincerely,    Daphne Mcdowell, DO     [facility]    cc:   Jackelyn Sims MD  [place]   [address]  [place] [address]

## 2017-04-26 NOTE — MR AVS SNAPSHOT
After Visit Summary   4/26/2017    Carmen Nuñez    MRN: [id number]           Patient Information     Date Of Birth          1960        Visit Information        Provider Department      4/26/2017 3:15 PM Daphne Mcdowell, DO  CARDIOLOGY        Today's Diagnoses     Hyperlipidemia LDL goal <70    -  1    Cardiomyopathy (H)        BPPV (benign paroxysmal positional vertigo), unspecified laterality        Coronary artery disease involving native coronary artery of native heart without angina pectoris        Postsurgical aortocoronary bypass status           Follow-ups after your visit        Additional Services     Follow-Up with Cardiologist                 Your next 10 appointments already scheduled     May 12, 2017  3:30 PM CDT   MADHU Extremity with Agus Maldonado PT   [place] [facility] ([facility] [place])    [address] [place] [address]  [address]  [place] [address]   [phone]            May 19, 2017  3:30 PM CDT   MADHU Extremity with Agus Maldonado PT   [facility] [place] [facility] ([facility] [place])    [address] [place] [address]  [address]  [place] [address]   [phone]              Future tests that were ordered for you today     Open Future Orders        Priority Expected Expires Ordered    [place] Exercise stress test (nuc card) Routine 4/26/2018 5/31/2018 4/26/2017    Follow-Up with Cardiologist Routine 4/26/2018 9/8/2018 4/26/2017            Who to contact     If you have questions or need follow up information about today's clinic visit or your schedule please contact  CARDIOLOGY directly at [phone].  Normal or non-critical lab and imaging results will be communicated to you by MyChart, letter or phone within 4 business days after the clinic has received the results. If you do not hear from us within 7 days, please contact the clinic through MyChart or phone. If you have a critical or  "abnormal lab result, we will notify you by phone as soon as possible.  Submit refill requests through [employer] or call your pharmacy and they will forward the refill request to us. Please allow 3 business days for your refill to be completed.          Additional Information About Your Visit        MyChart Information     MeetCast lets you send messages to your doctor, view your test results, renew your prescriptions, schedule appointments and more. To sign up, go to [email or web address] . Click on \"Log in\" on the left side of the screen, which will take you to the Welcome page. Then click on \"Sign up Now\" on the right side of the page.     You will be asked to enter the access code listed below, as well as some personal information. Please follow the directions to create your username and password.     Your access code is: [email or web address]  Expires: 2017  6:03 PM     Your access code will  in 90 days. If you need help or a new code, please call your [place] clinic or [phone].        Care EveryWhere ID     This is your Care EveryWhere ID. This could be used by other organizations to access your [place] medical records  [id number]        Your Vitals Were     Pulse Height BMI (Body Mass Index)             72 1.651 m (5' 5\") 36.28 kg/m2          Blood Pressure from Last 3 Encounters:   17 110/78   17 119/75   16 110/72    Weight from Last 3 Encounters:   17 98.9 kg (218 lb)   17 99.3 kg (219 lb)   16 94.8 kg (209 lb)              We Performed the Following     EKG 12-lead complete w/read - Clinics (performed today)        Primary Care Provider Office Phone # Fax #    Jackelyn Sims [phone]       [place]  [address]  [place] [address]        Thank you!     Thank you for choosing  CARDIOLOGY  for your care. Our goal is always to provide you with excellent care. Hearing back from our patients is one way we can " continue to improve our services. Please take a few minutes to complete the written survey that you may receive in the mail after your visit with us. Thank you!             Your Updated Medication List - Protect others around you: Learn how to safely use, store and throw away your medicines at www.disposemymeds.org.          This list is accurate as of: 4/26/17  3:50 PM.  Always use your most recent med list.                   Brand Name Dispense Instructions for use    amitriptyline 10 MG tablet    ELAVIL    90 tablet    Take 2 tablets by mouth At Bedtime.       aspirin 81 MG tablet      Take 81 mg by mouth daily       atorvastatin 20 MG tablet    LIPITOR    90 tablet    Take 1 tablet (20 mg) by mouth daily       calcium 500 MG Chew      1 per day       citalopram 40 MG tablet    celeXA    90 tablet    Take 1 tablet (40 mg) by mouth At Bedtime       clonazePAM 2 MG tablet    klonoPIN    60 tablet    Take 2 mg by mouth At Bedtime       desonide 0.05 % cream    DESOWEN    15 g    Apply sparingly to affected area three times daily for 14 days.       metoprolol 50 MG 24 hr tablet    TOPROL-XL    90 tablet    Take 1 tablet (50 mg) by mouth daily       MULTIVITAMIN TABS   OR      one tablet daily       nitroglycerin 0.4 MG sublingual tablet    NITROSTAT    25 tablet    Place 1 tablet (0.4 mg) under the tongue every 5 minutes as needed for chest pain       OMEGA-3 FISH OIL PO

## 2017-04-27 NOTE — PROGRESS NOTES
REFERRING PHYSICIAN:  Dr. Camryn Jett.      HISTORY OF PRESENT ILLNESS:  Ms. Nuñez is a pleasant 57-year-old female with a history of coronary disease.  She had a previous coronary artery bypass grafting in 2011 with a LIMA to an LAD, vein graft to an obtuse marginal and vein graft to the distal right coronary artery.  At the time she had presented with heart attack and her primary symptom was pain between her shoulder blades.  She did not have typical symptoms.  She had no complications following her bypass surgery.  She is being treated for this including hypertension and dyslipidemia as well.  She had previously been seen by Dr. Barton for several years.  Her last visit to our clinic was with Dr. Yeh for preop cardiac clearance for a right rotator cuff repair.  She had a stress perfusion imaging study in 2014 prior to that surgery which was normal and she underwent a successful rotator cuff repair without any cardiac or other complications.  She is here mainly to establish or reestablish cardiac care.  She is not having any symptoms right now that she is concerned about.  Overall, she feels very well.  She has not had any recurrent symptoms of upper back pain, difficulties breathing or exercise intolerance.  She does try to watch her diet.  She works at [employer] and tries to bring her own meals when she is working.  She also admits she does try to exercise, but it is intermittent and not on a regular basis.  Her last cholesterol profile was at the beginning of this month.  Total cholesterol was 153, HDL 37, LDL 67, triglycerides 243.  This has been her pattern with HDL deficiency and hypertriglyceridemia for several years now.  She is on moderate intensity statin therapy and compliant with this.  She is being monitored for borderline diabetes and has a family history of diabetes.  We did perform an electrocardiogram in office today, which I have reviewed.  This demonstrates a normal sinus rhythm.  She does  have poor R-wave progression in the anterior precordial leads with ST and T-wave abnormalities, mainly in the anterior and lateral leads, but when compared to her previous from 2014 these findings looks very similar.      PHYSICAL EXAMINATION:   VITAL SIGNS:  On exam today, her blood pressure is 110/78, pulse is 72, weight 218.  Body mass index of 36.  It looks like she has gained about 9 pounds over the last year.   NECK:  Carotid upstrokes are brisk without bruit.   CARDIOVASCULAR:  Tones are regular.  I do not appreciate a murmur, gallop or rub.   LUNGS:  Clear posteriorly.   EXTREMITIES:  She has palpable pulses in the distal extremities without peripheral edema.      SUMMARY:  Ms. Nuñez is a pleasant 57-year-old female with a history of coronary disease, previous 3-vessel CABG, preserved LV systolic function, obesity, hypertension and mixed hyperlipidemia.  She seems stable from a cardiac perspective.  She has had atypical symptoms in the past.  She is currently not symptomatic and she is on appropriate medical therapy for prevention of future cardiovascular events.  She did have a stress perfusion imaging study in 10/2014.  At that time, she had no evidence of ongoing ischemia and her ejection fraction was gated at 66%.  I will recommend continued current medical management and I have encouraged her to stay away from fried foods and fatty foods as well as highly processed carbohydrates.  We also talked about improving on her consistency with exercise.  This will be both important for her borderline diabetes as well as her heart disease.  Given that she had atypical symptoms, I will recommend a stress testing every 3-5 years as well.  I am happy to continue to follow her on an annual basis or as needed.  Please feel free to contact me with any questions you have in regards to her care.      cc:   Camryn Jett MD   [address]  [address]         SALLY ZAMAN,  DO             D: 2017 15:48   T: 2017 12:55   MT: ADI      Name:     KENNETH MENJIVAR   MRN:      [id number]        Account:      [id number]   :      1960           Service Date: 2017      Document: [id number]

## 2017-05-12 ENCOUNTER — THERAPY VISIT (OUTPATIENT)
Dept: PHYSICAL THERAPY | Facility: CLINIC | Age: 57
End: 2017-05-12
Payer: COMMERCIAL

## 2017-05-12 DIAGNOSIS — M25.572 ACUTE LEFT ANKLE PAIN: ICD-10-CM

## 2017-05-12 DIAGNOSIS — M25.562 ACUTE PAIN OF LEFT KNEE: ICD-10-CM

## 2017-05-12 PROCEDURE — 97110 THERAPEUTIC EXERCISES: CPT | Mod: GP | Performed by: PHYSICAL THERAPIST

## 2017-05-12 PROCEDURE — 97530 THERAPEUTIC ACTIVITIES: CPT | Mod: GP | Performed by: PHYSICAL THERAPIST

## 2017-05-12 ASSESSMENT — ACTIVITIES OF DAILY LIVING (ADL)
HOW_WOULD_YOU_RATE_THE_OVERALL_FUNCTION_OF_YOUR_KNEE_DURING_YOUR_USUAL_DAILY_ACTIVITIES?: ABNORMAL
GIVING WAY, BUCKLING OR SHIFTING OF KNEE: THE SYMPTOM AFFECTS MY ACTIVITY SLIGHTLY
RISE FROM A CHAIR: ACTIVITY IS MINIMALLY DIFFICULT
WALK: ACTIVITY IS SOMEWHAT DIFFICULT
LIMPING: I HAVE THE SYMPTOM BUT IT DOES NOT AFFECT MY ACTIVITY
SWELLING: I HAVE THE SYMPTOM BUT IT DOES NOT AFFECT MY ACTIVITY
WEAKNESS: THE SYMPTOM AFFECTS MY ACTIVITY SEVERELY
KNEEL ON THE FRONT OF YOUR KNEE: ACTIVITY IS VERY DIFFICULT
AS_A_RESULT_OF_YOUR_KNEE_INJURY,_HOW_WOULD_YOU_RATE_YOUR_CURRENT_LEVEL_OF_DAILY_ACTIVITY?: ABNORMAL
SQUAT: ACTIVITY IS MINIMALLY DIFFICULT
PAIN: THE SYMPTOM AFFECTS MY ACTIVITY SEVERELY
GO DOWN STAIRS: ACTIVITY IS FAIRLY DIFFICULT
HOW_WOULD_YOU_RATE_THE_CURRENT_FUNCTION_OF_YOUR_KNEE_DURING_YOUR_USUAL_DAILY_ACTIVITIES_ON_A_SCALE_FROM_0_TO_100_WITH_100_BEING_YOUR_LEVEL_OF_KNEE_FUNCTION_PRIOR_TO_YOUR_INJURY_AND_0_BEING_THE_INABILITY_TO_PERFORM_ANY_OF_YOUR_USUAL_DAILY_ACTIVITIES?: 50
STAND: ACTIVITY IS MINIMALLY DIFFICULT
KNEE_ACTIVITY_OF_DAILY_LIVING_SCORE: 61.43
STIFFNESS: THE SYMPTOM AFFECTS MY ACTIVITY SLIGHTLY
KNEE_ACTIVITY_OF_DAILY_LIVING_SUM: 43
SIT WITH YOUR KNEE BENT: ACTIVITY IS NOT DIFFICULT
RAW_SCORE: 43
GO UP STAIRS: ACTIVITY IS MINIMALLY DIFFICULT

## 2017-05-12 NOTE — MR AVS SNAPSHOT
After Visit Summary   5/12/2017    Carmen Nuñez    MRN: [id number]           Patient Information     Date Of Birth          1960        Visit Information        Provider Department      5/12/2017 3:30 PM Agus Maldonado [facility] [place] [facility]        Today's Diagnoses     Acute left ankle pain        Acute pain of left knee           Follow-ups after your visit        Your next 10 appointments already scheduled     May 19, 2017  4:00 PM CDT   MADHU Extremity with Valerio [facility] ([facility] [place])    [address] [place] [address]  [address]  [place] [address]   [phone]            Jun 09, 2017  3:30 PM CDT   MADHU Extremity with Agus Maldonado [facility] ([facility] [place])    [address] [place] [address]  [address]  [place] [address]   [phone]              Who to contact     If you have questions or need follow up information about today's clinic visit or your schedule please contact [facility] directly at [phone].  Normal or non-critical lab and imaging results will be communicated to you by [employer], letter or phone within 4 business days after the clinic has received the results. If you do not hear from us within 7 days, please contact the clinic through [employer] or phone. If you have a critical or abnormal lab result, we will notify you by phone as soon as possible.  Submit refill requests through [employer] or call your pharmacy and they will forward the refill request to us. Please allow 3 business days for your refill to be completed.          Additional Information About Your Visit        [employer] Information     [employer] lets you send messages to your doctor, view your test results, renew your prescriptions, schedule appointments and more. To sign up, go to  [email or web address] . Click on \"Log in\" on the left side of the screen, which will take you to the Welcome page. Then click on \"Sign up Now\" on the right side of the page.     You will be asked to enter the access code listed below, as well as some personal information. Please follow the directions to create your username and password.     Your access code is: [email or web address]  Expires: 2017  6:03 PM     Your access code will  in 90 days. If you need help or a new code, please call your [place] clinic or [phone].        Care EveryWhere ID     This is your Care EveryWhere ID. This could be used by other organizations to access your [place] medical records  [id number]         Blood Pressure from Last 3 Encounters:   17 110/78   17 119/75   16 110/72    Weight from Last 3 Encounters:   17 98.9 kg (218 lb)   17 99.3 kg (219 lb)   16 94.8 kg (209 lb)              We Performed the Following     THERAPEUTIC ACTIVITIES     THERAPEUTIC EXERCISES        Primary Care Provider Office Phone # Fax #    Jackelyn Sims [phone]       [place] [address] [place] [address] [address]  [place] [address]        Thank you!     Thank you for [facility]  for your care. Our goal is always to provide you with excellent care. Hearing back from our patients is one way we can continue to improve our services. Please take a few minutes to complete the written survey that you may receive in the mail after your visit with us. Thank you!             Your Updated Medication List - Protect others around you: Learn how to safely use, store and throw away your medicines at www.disposemymeds.org.          This list is accurate as of: 17  4:18 PM.  Always use your most recent med list.                   Brand Name Dispense Instructions for use    amitriptyline 10 MG tablet    ELAVIL    90 tablet    Take 2 " tablets by mouth At Bedtime.       aspirin 81 MG tablet      Take 81 mg by mouth daily       atorvastatin 20 MG tablet    LIPITOR    90 tablet    Take 1 tablet (20 mg) by mouth daily       calcium 500 MG Chew      1 per day       citalopram 40 MG tablet    celeXA    90 tablet    Take 1 tablet (40 mg) by mouth At Bedtime       clonazePAM 2 MG tablet    klonoPIN    60 tablet    Take 2 mg by mouth At Bedtime       desonide 0.05 % cream    DESOWEN    15 g    Apply sparingly to affected area three times daily for 14 days.       metoprolol 50 MG 24 hr tablet    TOPROL-XL    90 tablet    Take 1 tablet (50 mg) by mouth daily       MULTIVITAMIN TABS   OR      one tablet daily       nitroglycerin 0.4 MG sublingual tablet    NITROSTAT    25 tablet    Place 1 tablet (0.4 mg) under the tongue every 5 minutes as needed for chest pain       OMEGA-3 FISH OIL PO

## 2017-05-12 NOTE — PROGRESS NOTES
PROGRESS  REPORT    Progress reporting period is from 10/14/17 to 5/12/17.       Dx: R knee pain       Therapist Impression:     Carmen has failed to make any objective progress. She also admits to not being compliant in her PT POC/HEP. Her KOS Score slightly better this date. PT had a long discussion with Carmen this date about the compliance, healthcare utilization, and the need to show objective progress to continue therapy. She would like to continue PT if she can. PT has agreed to 2 more visits with a focus on her HEP, motivational interviewing, with subsequent D/C with HEP.    KOS SCORE: 61%    SUBJECTIVE  Pt reports her knee continues to be painful. She hasn't been doing her HEP. She feels her knee function is about the same.     OBJECTIVE    Strength assessment:    Strength testing (0-5 MMT scale) L R   hip flexion 3 3   Hip Abduction 3+ 3+   Hip ABD/ER 4 4   Hip Extension     Knee Extension 4 4   Knee Flexion 4 4   IR/ER  4/3 4/3   Lower Abdominal BILAT SLR lower (angle measured from 90 degrees hip flexion)       30 sec sit/stand:  11 reps (RPE: 1/10 fatigue)       ASSESSMENT/PLAN  Updated problem list and treatment plan: Diagnosis 1:  R knee pain  Pain -  directional preference exercise and home program  Decreased ROM/flexibility - therapeutic exercise, therapeutic activity and home program  Decreased strength - therapeutic exercise, therapeutic activities and home program  STG/LTGs have been met or progress has been made towards goals:  Yes (See Goal flow sheet completed today.)  Assessment of Progress: The patient's progress has plateaued.  The patient is no longer making progress in all 3 of the following areas: subjectively, objectively and functionally.  Self Management Plans:  Patient has been instructed in a home treatment program.  Patient is independent in a home treatment program.  Patient  has been instructed in self management of symptoms.  I have re-evaluated this patient and find that the  nature, scope, duration and intensity of the therapy is appropriate for the medical condition of the patient.  Carmen continues to require the following intervention to meet STG and LTG's:  PT    Recommendations:  This patient would benefit from continued therapy.     Frequency:  1 X week, once daily  Duration:  for 2 visits    Please refer to the daily flowsheet for treatment today, total treatment time and time spent performing 1:1 timed codes.

## 2017-05-19 ENCOUNTER — THERAPY VISIT (OUTPATIENT)
Dept: PHYSICAL THERAPY | Facility: CLINIC | Age: 57
End: 2017-05-19
Payer: COMMERCIAL

## 2017-05-19 DIAGNOSIS — M25.562 ACUTE PAIN OF LEFT KNEE: ICD-10-CM

## 2017-05-19 DIAGNOSIS — M25.572 ACUTE LEFT ANKLE PAIN: ICD-10-CM

## 2017-05-19 PROCEDURE — 97110 THERAPEUTIC EXERCISES: CPT | Mod: GP | Performed by: PHYSICAL THERAPIST

## 2017-05-31 ENCOUNTER — OFFICE VISIT (OUTPATIENT)
Dept: FAMILY MEDICINE | Facility: CLINIC | Age: 57
End: 2017-05-31
Payer: COMMERCIAL

## 2017-05-31 VITALS
BODY MASS INDEX: 36.49 KG/M2 | DIASTOLIC BLOOD PRESSURE: 74 MMHG | OXYGEN SATURATION: 95 % | HEIGHT: 65 IN | SYSTOLIC BLOOD PRESSURE: 121 MMHG | TEMPERATURE: 98.6 F | WEIGHT: 219 LBS | HEART RATE: 66 BPM

## 2017-05-31 DIAGNOSIS — J40 BRONCHITIS: Primary | ICD-10-CM

## 2017-05-31 PROCEDURE — 99213 OFFICE O/P EST LOW 20 MIN: CPT | Performed by: FAMILY MEDICINE

## 2017-05-31 RX ORDER — DOXYCYCLINE 100 MG/1
100 CAPSULE ORAL 2 TIMES DAILY
Qty: 20 CAPSULE | Refills: 0 | Status: SHIPPED | OUTPATIENT
Start: 2017-05-31 | End: 2017-06-19

## 2017-05-31 RX ORDER — CODEINE PHOSPHATE AND GUAIFENESIN 10; 100 MG/5ML; MG/5ML
1 SOLUTION ORAL EVERY 4 HOURS PRN
Qty: 120 ML | Refills: 0 | Status: SHIPPED | OUTPATIENT
Start: 2017-05-31 | End: 2017-06-19

## 2017-05-31 NOTE — MR AVS SNAPSHOT
"              After Visit Summary   5/31/2017    Carmen Nuñez    MRN: [id number]           Patient Information     Date Of Birth          1960        Visit Information        Provider Department      5/31/2017 1:45 PM Jackelyn Sims MD [facility] [place]        Today's Diagnoses     Bronchitis    -  1      Care Instructions    Take medications as directed.  Treatment  and symptomatic cares discussed   Follow up if problem or concern             Follow-ups after your visit        Your next 10 appointments already scheduled     Jun 09, 2017  3:30 PM CDT   [facility] Extremity with Agus Maldonado PT   [facility] [place] ([facility] [place])    [address] [place] [address]  [id number]  [place] [address] [phone]              Who to contact     If you have questions or need follow up information about today's clinic visit or your schedule please contact [facility] [place] directly at [phone].  Normal or non-critical lab and imaging results will be communicated to you by ShopIgniterhart, letter or phone within 4 business days after the clinic has received the results. If you do not hear from us within 7 days, please contact the clinic through [employer] or phone. If you have a critical or abnormal lab result, we will notify you by phone as soon as possible.  Submit refill requests through CloudFl[employer] or call your pharmacy and they will forward the refill request to us. Please allow 3 business days for your refill to be completed.          Additional Information About Your Visit        ShopIgniterharDeciZium Information     CloudFlare lets you send messages to your doctor, view your test results, renew your prescriptions, schedule appointments and more. To sign up, go to [email or web address] . Click on \"Log in\" on the left side of the screen, which will take you to the Welcome page. Then click on \"Sign up Now\" on the right side of the page.     You will be asked to " "enter the access code listed below, as well as some personal information. Please follow the directions to create your username and password.     Your access code is: [email or web address]  Expires: 2017  4:39 PM     Your access code will  in 90 days. If you need help or a new code, please call your [place] clinic or [phone].        Care EveryWhere ID     This is your Care EveryWhere ID. This could be used by other organizations to access your [place] medical records  [id number]        Your Vitals Were     Pulse Temperature Height Pulse Oximetry BMI (Body Mass Index)       66 98.6  F (37  C) (Tympanic) 5' 5\" (1.651 m) 93% 36.44 kg/m2        Blood Pressure from Last 3 Encounters:   17 121/74   17 110/78   17 119/75    Weight from Last 3 Encounters:   17 219 lb (99.3 kg)   17 218 lb (98.9 kg)   17 219 lb (99.3 kg)              We Performed the Following     DEPRESSION ACTION PLAN (DAP)          Today's Medication Changes          These changes are accurate as of: 17  2:17 PM.  If you have any questions, ask your nurse or doctor.               Start taking these medicines.        Dose/Directions    doxycycline 100 MG capsule   Commonly known as:  VIBRAMYCIN   Used for:  Bronchitis   Started by:  Jackelyn Sims MD        Dose:  100 mg   Take 1 capsule (100 mg) by mouth 2 times daily   Quantity:  20 capsule   Refills:  0       guaiFENesin-codeine 100-10 MG/5ML Soln solution   Commonly known as:  ROBITUSSIN AC   Used for:  Bronchitis   Started by:  Jackelyn Sims MD        Dose:  1 tsp.   Take 5 mLs by mouth every 4 hours as needed for cough   Quantity:  120 mL   Refills:  0            Where to get your medicines      These medications were sent to [id number] - [place], [place] - [address]  [address] [address]     Phone:  [phone]     doxycycline 100 MG capsule         Some of these will need a paper prescription and others " can be bought over the counter.  Ask your nurse if you have questions.     Bring a paper prescription for each of these medications     guaiFENesin-codeine 100-10 MG/5ML Soln solution                Primary Care Provider Office Phone # Fax #    Jackelyn Sims [phone]       [facility] [place] [facility] [address] [place] [address] [address]  [place] [address]        Thank you!     Thank you for choosing [facility]  for your care. Our goal is always to provide you with excellent care. Hearing back from our patients is one way we can continue to improve our services. Please take a few minutes to complete the written survey that you may receive in the mail after your visit with us. Thank you!             Your Updated Medication List - Protect others around you: Learn how to safely use, store and throw away your medicines at www.disposemymeds.org.          This list is accurate as of: 5/31/17  2:17 PM.  Always use your most recent med list.                   Brand Name Dispense Instructions for use    amitriptyline 10 MG tablet    ELAVIL    90 tablet    Take 2 tablets by mouth At Bedtime.       aspirin 81 MG tablet      Take 81 mg by mouth daily       atorvastatin 20 MG tablet    LIPITOR    90 tablet    Take 1 tablet (20 mg) by mouth daily       calcium 500 MG Chew      1 per day       citalopram 40 MG tablet    celeXA    90 tablet    Take 1 tablet (40 mg) by mouth At Bedtime       clonazePAM 2 MG tablet    klonoPIN    60 tablet    Take 2 mg by mouth At Bedtime       desonide 0.05 % cream    DESOWEN    15 g    Apply sparingly to affected area three times daily for 14 days.       doxycycline 100 MG capsule    VIBRAMYCIN    20 capsule    Take 1 capsule (100 mg) by mouth 2 times daily       guaiFENesin-codeine 100-10 MG/5ML Soln solution    ROBITUSSIN AC    120 mL    Take 5 mLs by mouth every 4 hours as needed for cough       metoprolol 50 MG 24 hr tablet    TOPROL-XL    90 tablet    Take 1 tablet  (50 mg) by mouth daily       MULTIVITAMIN TABS   OR      one tablet daily       nitroglycerin 0.4 MG sublingual tablet    NITROSTAT    25 tablet    Place 1 tablet (0.4 mg) under the tongue every 5 minutes as needed for chest pain       OMEGA-3 FISH OIL PO

## 2017-05-31 NOTE — NURSING NOTE
"Chief Complaint   Patient presents with     URI       Initial /74  Pulse 66  Temp 98.6  F (37  C) (Tympanic)  Ht 5' 5\" (1.651 m)  Wt 219 lb (99.3 kg)  SpO2 93%  BMI 36.44 kg/m2 Estimated body mass index is 36.44 kg/(m^2) as calculated from the following:    Height as of this encounter: 5' 5\" (1.651 m).    Weight as of this encounter: 219 lb (99.3 kg).  Medication Reconciliation: complete  "

## 2017-05-31 NOTE — PROGRESS NOTES
SUBJECTIVE:                                                    Carmen Nuñez is a 57 year old female who presents to clinic today for the following health issues:      RESPIRATORY SYMPTOMS      Duration: 6-7 days     Description  nasal congestion, rhinorrhea, cough with productive  headache, fatigue/malaise . Sometimes chest feels heavy although no wheezing. No fever but feeling hot and chilled     Severity: severe    Accompanying signs and symptoms: keeps her up at night ,     History (predisposing factors):  Hx of bronchitis and pneumonia     Precipitating or alleviating factors: None    Therapies tried and outcome:  none           Problem list and histories reviewed & adjusted, as indicated.  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, in remission     Allergic rhinitis due to other allergen     Acute gastritis     Carpal tunnel syndrome     Obesity     Hyperlipidemia LDL goal <70     Non-ST elevation myocardial infarction, subsequent care episode     Impingement syndrome of right shoulder     History of colonic polyps     Advanced directives, counseling/discussion     Coronary artery disease     Cardiomyopathy (H)     Nonspecific abnormal results of liver function study     S/P rotator cuff repair     Adjustment disorder with mixed anxiety and depressed mood     BPPV (benign paroxysmal positional vertigo), unspecified laterality     Brain aneurysm     Acute left ankle pain     Acute pain of left knee     Tricuspid valve disorders, non-rheumatic     Past Surgical History:   Procedure Laterality Date     C APPENDECTOMY  1985    incidental     C NONSPECIFIC PROCEDURE  1985    colposcopy/cryotherapy of cervix for abnl pap smear     C NONSPECIFIC PROCEDURE  1992    clipping ruptured cerebral aneurysm     C NONSPECIFIC PROCEDURE  10/05    UGI with SBFT nl     C VAGINAL  HYSTERECTOMY  04/03    partial ovaries remain     CHOLECYSTECTOMY, OPEN  1985     CORONARY ANGIOGRAPHY ADULT ORDER  1-20-11      New critical left main stenosis of 80-90% , CABG recommended     CORONARY ARTERY BYPASS  1-24-11    X3 1/11 LIMA to LAD, SVG to OM, SVG aorta to distal RCA     CRANIOTOMY      resection on brai aneurysm at age 31      CRANIOTOMY, REPAIR ANEURYSM, COMBINED      at age 31      HC COLONOSCOPY THRU STOMA, DIAGNOSTIC  10/05    bx of terminal ileal nodules benign     HC UGI ENDOSCOPY DIAG W OR W/O BRUSH/WASH  10/05    erosive gastritis, neg sprue bx     HEART CATH, ANGIOPLASTY  Nov 2010    intracoronary stenting in the mid LAD.       HYSTERECTOMY, PAP NO LONGER INDICATED       HYSTERECTOMY, PAP NO LONGER INDICATED       TONSILLECTOMY      tonsillectomy       Social History   Substance Use Topics     Smoking status: Former Smoker     Packs/day: 2.00     Years: 22.00     Types: Cigarettes     Quit date: 1/1/1994     Smokeless tobacco: Never Used     Alcohol use No      Comment: quit 1994     Family History   Problem Relation Age of Onset     C.A.D. Mother      HEART DISEASE Mother      heart disease     CEREBROVASCULAR DISEASE Mother      age 70s     DIABETES Mother      type 2     Hypertension Mother      Lipids Mother      Myocardial Infarction Mother      CEREBROVASCULAR DISEASE Father      age mid 70s     CANCER Father      breast     Breast Cancer Father      onset age 72     Alcohol/Drug Father      Cardiovascular Father      abdominal aortic aneurysm, smoker     Myocardial Infarction Brother      2            Reviewed and updated as needed this visit by clinical staff       Reviewed and updated as needed this visit by Provider         ROS:  C: NEGATIVE for fever, chills, change in weight  I: NEGATIVE for worrisome rashes, moles or lesions  E: NEGATIVE for vision changes or irritation  ENT/MOUTH: as per HPI   RESP:as above  CV: NEGATIVE for chest pain, palpitations or peripheral edema  GI:  "NEGATIVE for nausea, abdominal pain, heartburn, or change in bowel habits  M: NEGATIVE for significant arthralgias or myalgia  H: NEGATIVE for bleeding problems  P: NEGATIVE for changes in mood or affect    OBJECTIVE:                                                    /74  Pulse 66  Temp 98.6  F (37  C) (Tympanic)  Ht 5' 5\" (1.651 m)  Wt 219 lb (99.3 kg)  SpO2 93%  BMI 36.44 kg/m2  Body mass index is 36.44 kg/(m^2).  GENERAL: healthy, alert and no distress  EYES: Eyes grossly normal to inspection, PERRL and conjunctivae and sclerae normal  HENT: ear canals and TM's normal and oral mucous membranes moist  NECK: no adenopathy  RESP: lungs clear to auscultation - no rales, rhonchi or wheezes  CV: regular rate and rhythm, normal S1 S2, no S3 or S4, no murmur, click or rub, no peripheral edema and peripheral pulses strong  PSYCH: mentation appears normal, affect normal/bright         ASSESSMENT/PLAN:                                                        (J40) Bronchitis  (primary encounter diagnosis)  Comment: per pt z pack does not work for her   Plan: doxycycline (VIBRAMYCIN) 100 MG capsule,            guaiFENesin-codeine (ROBITUSSIN AC) 100-10         MG/5ML SOLN solution              Patient expressed understanding and agreement with treatment plan. All patient's questions were answered, will let me know if has more later.  Medications: Rx's: Reviewed the potential side effects/complications of medications prescribed.       Jackelyn Sims MD  [facility]    "

## 2017-05-31 NOTE — LETTER
My Depression Action Plan  Name: Carmen Nuñez   Date of Birth 1960  Date: 5/31/2017    My doctor: Jackelyn Sims   My clinic: [address] [address]  [phone]          GREEN    ZONE   Good Control    What it looks like:     Things are going generally well. You have normal up s and down s. You may even feel depressed from time to time, but bad moods usually last less than a day.   What you need to do:  1. Continue to care for yourself (see self care plan)  2. Check your depression survival kit and update it as needed  3. Follow your physician s recommendations including any medication.  4. Do not stop taking medication unless you consult with your physician first.           YELLOW         ZONE Getting Worse    What it looks like:     Depression is starting to interfere with your life.     It may be hard to get out of bed; you may be starting to isolate yourself from others.    Symptoms of depression are starting to last most all day and this has happened for several days.     You may have suicidal thoughts but they are not constant.   What you need to do:     1. Call your care team, your response to treatment will improve if you keep your care team informed of your progress. Yellow periods are signs an adjustment may need to be made.     2. Continue your self-care, even if you have to fake it!    3. Talk to someone in your support network    4. Open up your depression survival kit           RED    ZONE Medical Alert - Get Help    What it looks like:     Depression is seriously interfering with your life.     You may experience these or other symptoms: You can t get out of bed most days, can t work or engage in other necessary activities, you have trouble taking care of basic hygiene, or basic responsibilities, thoughts of suicide or death that will not go away, self-injurious behavior.     What you need to do:  1. Call  your care team and request a same-day appointment. If they are not available (weekends or after hours) call your local crisis line, emergency room or 911.      Electronically signed by: Jackelyn Sims, May 31, 2017    Depression Self Care Plan / Survival Kit    Self-Care for Depression  Here s the deal. Your body and mind are really not as separate as most people think.  What you do and think affects how you feel and how you feel influences what you do and think. This means if you do things that people who feel good do, it will help you feel better.  Sometimes this is all it takes.  There is also a place for medication and therapy depending on how severe your depression is, so be sure to consult with your medical provider and/ or Behavioral Health Consultant if your symptoms are worsening or not improving.     In order to better manage my stress, I will:    Exercise  Get some form of exercise, every day. This will help reduce pain and release endorphins, the  feel good  chemicals in your brain. This is almost as good as taking antidepressants!  This is not the same as joining a gym and then never going! (they count on that by the way ) It can be as simple as just going for a walk or doing some gardening, anything that will get you moving.      Hygiene   Maintain good hygiene (Get out of bed in the morning, Make your bed, Brush your teeth, Take a shower, and Get dressed like you were going to work, even if you are unemployed).  If your clothes don't fit try to get ones that do.    Diet  I will strive to eat foods that are good for me, drink plenty of water, and avoid excessive sugar, caffeine, alcohol, and other mood-altering substances.  Some foods that are helpful in depression are: complex carbohydrates, B vitamins, flaxseed, fish or fish oil, fresh fruits and vegetables.    Psychotherapy  I agree to participate in Individual Therapy (if recommended).    Medication  If prescribed medications, I agree to take  them.  Missing doses can result in serious side effects.  I understand that drinking alcohol, or other illicit drug use, may cause potential side effects.  I will not stop my medication abruptly without first discussing it with my provider.    Staying Connected With Others  I will stay in touch with my friends, family members, and my primary care provider/team.    Use your imagination  Be creative.  We all have a creative side; it doesn t matter if it s oil painting, sand castles, or mud pies! This will also kick up the endorphins.    Witness Beauty  (AKA stop and smell the roses) Take a look outside, even in mid-winter. Notice colors, textures. Watch the squirrels and birds.     Service to others  Be of service to others.  There is always someone else in need.  By helping others we can  get out of ourselves  and remember the really important things.  This also provides opportunities for practicing all the other parts of the program.    Humor  Laugh and be silly!  Adjust your TV habits for less news and crime-drama and more comedy.    Control your stress  Try breathing deep, massage therapy, biofeedback, and meditation. Find time to relax each day.     My support system    Clinic Contact:  Phone number:    Contact 1:  Phone number:    Contact 2:  Phone number:    Mu-ism/:  Phone number:    Therapist:  Phone number:    Local crisis center:    Phone number:    Other community support:  Phone number:

## 2017-06-09 ENCOUNTER — THERAPY VISIT (OUTPATIENT)
Dept: PHYSICAL THERAPY | Facility: CLINIC | Age: 57
End: 2017-06-09
Payer: COMMERCIAL

## 2017-06-09 DIAGNOSIS — M25.562 ACUTE PAIN OF LEFT KNEE: ICD-10-CM

## 2017-06-09 DIAGNOSIS — M25.572 ACUTE LEFT ANKLE PAIN: ICD-10-CM

## 2017-06-09 PROCEDURE — 97112 NEUROMUSCULAR REEDUCATION: CPT | Mod: GP | Performed by: PHYSICAL THERAPIST

## 2017-06-09 PROCEDURE — 97110 THERAPEUTIC EXERCISES: CPT | Mod: GP | Performed by: PHYSICAL THERAPIST

## 2017-06-15 ENCOUNTER — TELEPHONE (OUTPATIENT)
Dept: FAMILY MEDICINE | Facility: CLINIC | Age: 57
End: 2017-06-15

## 2017-06-15 NOTE — TELEPHONE ENCOUNTER
LVM for Shannon HENSON doesn't see that patient was seen this year for anything work comp related  Need clarification  Johnna HENSON

## 2017-06-15 NOTE — TELEPHONE ENCOUNTER
[place] [employer]  CONTACT NAME: CRESENCIO # [phone]    FAX# [phone]    WANTS OV NOTES, LAB TESTS ANYTHING TO DUE WITH PT COMP CLAIM.     CLAIM # [id number] NEEDS TO BE ON ALL PAPER WORK FAXED PER CALLER

## 2017-06-16 NOTE — TELEPHONE ENCOUNTER
TC spoke to Shannon  Patient just called the Claim in today and states her injury is from 7/31/17  TC faxed notes related to Left knee injury from 2016  Put claim number on all records  Will fax once TC gets a call back from patient stating she authorized this and it's ok to fax   Johnna HENSON

## 2017-06-19 ENCOUNTER — OFFICE VISIT (OUTPATIENT)
Dept: FAMILY MEDICINE | Facility: CLINIC | Age: 57
End: 2017-06-19
Payer: COMMERCIAL

## 2017-06-19 VITALS
WEIGHT: 216 LBS | OXYGEN SATURATION: 96 % | BODY MASS INDEX: 35.99 KG/M2 | TEMPERATURE: 98.5 F | SYSTOLIC BLOOD PRESSURE: 120 MMHG | DIASTOLIC BLOOD PRESSURE: 74 MMHG | HEART RATE: 67 BPM | HEIGHT: 65 IN

## 2017-06-19 DIAGNOSIS — J30.89 SEASONAL ALLERGIC RHINITIS DUE TO OTHER ALLERGIC TRIGGER: Primary | ICD-10-CM

## 2017-06-19 DIAGNOSIS — J01.80 OTHER ACUTE SINUSITIS: ICD-10-CM

## 2017-06-19 PROCEDURE — 99213 OFFICE O/P EST LOW 20 MIN: CPT | Performed by: FAMILY MEDICINE

## 2017-06-19 RX ORDER — CEFPROZIL 500 MG/1
500 TABLET, FILM COATED ORAL 2 TIMES DAILY
Qty: 20 TABLET | Refills: 0 | Status: SHIPPED | OUTPATIENT
Start: 2017-06-19 | End: 2017-08-09

## 2017-06-19 RX ORDER — FEXOFENADINE HCL 180 MG/1
180 TABLET ORAL DAILY
Qty: 30 TABLET | Refills: 0 | COMMUNITY
Start: 2017-06-19 | End: 2017-08-09

## 2017-06-19 RX ORDER — FLUTICASONE PROPIONATE 50 MCG
1-2 SPRAY, SUSPENSION (ML) NASAL DAILY
Qty: 1 BOTTLE | Refills: 11 | Status: SHIPPED | OUTPATIENT
Start: 2017-06-19 | End: 2018-05-02

## 2017-06-19 NOTE — PROGRESS NOTES
SUBJECTIVE:                                                    Carmen Nuñez is a 57 year old female who presents to clinic today for the following health issues:      RESPIRATORY SYMPTOMS      Duration: ongoing since last ov 5/31. She was somewhat better, but still ahs ongoing sx     Description  nasal congestion, cough, becoming productive of thick brown phlegm  wheezing, headache and chest congestion .     Severity: moderate    Accompanying signs and symptoms: No fever, no leg swelling etc     History (predisposing factors):  None     Precipitating or alleviating factors: had uri few weeks ago , was feeling slightly better, but eels liek same sx coming back     Therapies tried and outcome:  none       ALLERGIES      Duration: mostly all year  sometimes     Description:   Nasal congestion: YES  Sneezing: YES  Red, itchy eyes: no     Accompanying signs and symptoms: mostly nasal  sx     History (similar episodes/allergy testing): yes many years , was on shots in the past     Precipitating or alleviating factors: allergies to dust, mold,  trees , different grasses and shrubs     Therapies tried and outcome: was on allergy shots, also was on nose spray and given inhaler a while ago. Not taking ay allergy med's but concerned with sx recently          Problem list and histories reviewed & adjusted, as indicated.  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, in remission     Allergic rhinitis due to other allergen     Acute gastritis     Carpal tunnel syndrome     Obesity     Hyperlipidemia LDL goal <70     Non-ST elevation myocardial infarction, subsequent care episode     Impingement syndrome of right shoulder     History of colonic polyps     Advanced directives, counseling/discussion     Coronary artery disease     Cardiomyopathy (H)     Nonspecific abnormal results of  liver function study     S/P rotator cuff repair     Adjustment disorder with mixed anxiety and depressed mood     BPPV (benign paroxysmal positional vertigo), unspecified laterality     Brain aneurysm     Acute left ankle pain     Acute pain of left knee     Tricuspid valve disorders, non-rheumatic     Past Surgical History:   Procedure Laterality Date     C APPENDECTOMY  1985    incidental     C NONSPECIFIC PROCEDURE  1985    colposcopy/cryotherapy of cervix for abnl pap smear     C NONSPECIFIC PROCEDURE  1992    clipping ruptured cerebral aneurysm     C NONSPECIFIC PROCEDURE  10/05    UGI with SBFT nl     C VAGINAL HYSTERECTOMY  04/03    partial ovaries remain     CHOLECYSTECTOMY, OPEN  1985     CORONARY ANGIOGRAPHY ADULT ORDER  1-20-11      New critical left main stenosis of 80-90% , CABG recommended     CORONARY ARTERY BYPASS  1-24-11    X3 1/11 LIMA to LAD, SVG to OM, SVG aorta to distal RCA     CRANIOTOMY      resection on brai aneurysm at age 31      CRANIOTOMY, REPAIR ANEURYSM, COMBINED      at age 31      HC COLONOSCOPY THRU STOMA, DIAGNOSTIC  10/05    bx of terminal ileal nodules benign     HC UGI ENDOSCOPY DIAG W OR W/O BRUSH/WASH  10/05    erosive gastritis, neg sprue bx     HEART CATH, ANGIOPLASTY  Nov 2010    intracoronary stenting in the mid LAD.       HYSTERECTOMY, PAP NO LONGER INDICATED       HYSTERECTOMY, PAP NO LONGER INDICATED       TONSILLECTOMY      tonsillectomy       Social History   Substance Use Topics     Smoking status: Former Smoker     Packs/day: 2.00     Years: 22.00     Types: Cigarettes     Quit date: 1/1/1994     Smokeless tobacco: Never Used     Alcohol use No      Comment: quit 1994     Family History   Problem Relation Age of Onset     C.A.D. Mother      HEART DISEASE Mother      heart disease     CEREBROVASCULAR DISEASE Mother      age 70s     DIABETES Mother      type 2     Hypertension Mother      Lipids Mother      Myocardial Infarction Mother      CEREBROVASCULAR DISEASE  "Father      age mid 70s     CANCER Father      breast     Breast Cancer Father      onset age 72     Alcohol/Drug Father      Cardiovascular Father      abdominal aortic aneurysm, smoker     Myocardial Infarction Brother      2            Reviewed and updated as needed this visit by clinical staff       Reviewed and updated as needed this visit by Provider         ROS:  Constitutional, HEENT, cardiovascular, pulmonary, GI, , musculoskeletal, neuro, skin, endocrine and psych systems are negative, except as otherwise noted.    OBJECTIVE:                                                    /74  Pulse 67  Temp 98.5  F (36.9  C) (Tympanic)  Ht 5' 5\" (1.651 m)  Wt 216 lb (98 kg)  SpO2 96%  BMI 35.94 kg/m2  Body mass index is 35.94 kg/(m^2).  GENERAL: healthy, alert and no distress  EYES: Eyes grossly normal to inspection, PERRL and conjunctivae and sclerae normal  HENT: ear canals and TM's normal, nasal mucosa edematous  and oral mucous membranes moist. Throat with mild pharyngeal erythema, +ve  sinus  tenderness  NECK: no adenopathy, no asymmetry,  RESP: lungs clear to auscultation - no rales, rhonchi or wheezes  CV: regular rate and rhythm, normal S1 S2, no S3 or S4,   ABDOMEN: soft, nontender, no hepatosplenomegaly, no masses and bowel sounds normal           ASSESSMENT/PLAN:                                                      (J30.89) Seasonal allergic rhinitis due to other allergic trigger  (primary encounter diagnosis)  Comment:   Plan: fluticasone (FLONASE) 50 MCG/ACT spray,         fexofenadine (ALLEGRA) 180 MG tablet            (J01.80) Other acute sinusitis  Comment:   Plan: cefPROZIL (CEFZIL) 500 MG tablet, fexofenadine         (ALLEGRA) 180 MG tablet          Talked about treatment, allergies etc willing to try allegra and Flonase again. Cares and symptomatic treatment discussed follow up if problem     Patient expressed understanding and agreement with treatment plan. All patient's questions " were answered, will let me know if has more later.  Medications: Rx's: Reviewed the potential side effects/complications of medications prescribed.       Jackelyn Sims MD  [facility]

## 2017-06-19 NOTE — MR AVS SNAPSHOT
"              After Visit Summary   6/19/2017    Carmen Nuñez    MRN: [id number]           Patient Information     Date Of Birth          1960        Visit Information        Provider Department      6/19/2017 10:30 AM [facility]        Today's Diagnoses     Seasonal allergic rhinitis due to other allergic trigger    -  1    Other acute sinusitis          Care Instructions    Take medications as directed.  Treatment  and symptomatic cares discussed   Follow up if problem or concern             Follow-ups after your visit        Who to contact     If you have questions or need follow up information about today's clinic visit or your schedule please [facility] at [phone].  Normal or non-critical lab and imaging results will be communicated to you by MyChart, letter or phone within 4 business days after the clinic has received the results. If you do not hear from us within 7 days, please contact the clinic through MyChart or phone. If you have a critical or abnormal lab result, we will notify you by phone as soon as possible.  Submit refill requests through [employer] or call your pharmacy and they will forward the refill request to us. Please allow 3 business days for your refill to be completed.          Additional Information About Your Visit        MyChart Information     [employer] lets you send messages to your doctor, view your test results, renew your prescriptions, schedule appointments and more. To sign up, go to [email or web address] . Click on \"Log in\" on the left side of the screen, which will take you to the Welcome page. Then click on \"Sign up Now\" on the right side of the page.     You will be asked to enter the access code listed below, as well as some personal information. Please follow the directions to create your username and password.     Your access code is: [email or web address]  Expires: 8/17/2017  4:39 PM   " "  Your access code will  in 90 days. If you need help or a new code, please call your [place] clinic or [phone].        Care EveryWhere ID     This is your Care EveryWhere ID. This could be used by other organizations to access your [place] medical records  [id number]        Your Vitals Were     Pulse Temperature Height Pulse Oximetry BMI (Body Mass Index)       67 98.5  F (36.9  C) (Tympanic) 5' 5\" (1.651 m) 96% 35.94 kg/m2        Blood Pressure from Last 3 Encounters:   17 120/74   17 121/74   17 110/78    Weight from Last 3 Encounters:   17 216 lb (98 kg)   17 219 lb (99.3 kg)   17 218 lb (98.9 kg)              Today, you had the following     No orders found for display         Today's Medication Changes          These changes are accurate as of: 17 11:07 AM.  If you have any questions, ask your nurse or doctor.               Start taking these medicines.        Dose/Directions    cefPROZIL 500 MG tablet   Commonly known as:  CEFZIL   Used for:  Other acute sinusitis   Started by:  Jackelyn Sims MD        Dose:  500 mg   Take 1 tablet (500 mg) by mouth 2 times daily   Quantity:  20 tablet   Refills:  0       fexofenadine 180 MG tablet   Commonly known as:  ALLEGRA   Used for:  Seasonal allergic rhinitis due to other allergic trigger, Other acute sinusitis   Started by:  Jackelyn Sims MD        Dose:  180 mg   Take 1 tablet (180 mg) by mouth daily   Quantity:  30 tablet   Refills:  0       fluticasone 50 MCG/ACT spray   Commonly known as:  FLONASE   Used for:  Seasonal allergic rhinitis due to other allergic trigger   Started by:  Jackelyn Sims MD        Dose:  1-2 spray   Spray 1-2 sprays into both nostrils daily   Quantity:  1 Bottle   Refills:  11            Where to get your medicines      These medications were sent to [id number] - [facility] [address]  [address] [facility] [address] [facility] [address]     Phone:  " [phone]     cefPROZIL 500 MG tablet    fluticasone 50 MCG/ACT spray         Some of these will need a paper prescription and others can be bought over the counter.  Ask your nurse if you have questions.     You don't need a prescription for these medications     fexofenadine 180 MG tablet                Primary Care Provider Office Phone # Fax #    Jackelyn Sims [phone]       [facility] [address]  [place] [address]        Thank you!     Thank you for choosing [facility]  for your care. Our goal is always to provide you with excellent care. Hearing back from our patients is one way we can continue to improve our services. Please take a few minutes to complete the written survey that you may receive in the mail after your visit with us. Thank you!             Your Updated Medication List - Protect others around you: Learn how to safely use, store and throw away your medicines at www.disposemymeds.org.          This list is accurate as of: 6/19/17 11:07 AM.  Always use your most recent med list.                   Brand Name Dispense Instructions for use    amitriptyline 10 MG tablet    ELAVIL    90 tablet    Take 2 tablets by mouth At Bedtime.       aspirin 81 MG tablet      Take 81 mg by mouth daily       atorvastatin 20 MG tablet    LIPITOR    90 tablet    Take 1 tablet (20 mg) by mouth daily       calcium 500 MG Chew      1 per day       cefPROZIL 500 MG tablet    CEFZIL    20 tablet    Take 1 tablet (500 mg) by mouth 2 times daily       citalopram 40 MG tablet    celeXA    90 tablet    Take 1 tablet (40 mg) by mouth At Bedtime       clonazePAM 2 MG tablet    klonoPIN    60 tablet    Take 2 mg by mouth At Bedtime       desonide 0.05 % cream    DESOWEN    15 g    Apply sparingly to affected area three times daily for 14 days.       fexofenadine 180 MG tablet    ALLEGRA    30 tablet    Take 1 tablet (180 mg) by mouth daily       fluticasone  50 MCG/ACT spray    FLONASE    1 Bottle    Spray 1-2 sprays into both nostrils daily       metoprolol 50 MG 24 hr tablet    TOPROL-XL    90 tablet    Take 1 tablet (50 mg) by mouth daily       MULTIVITAMIN TABS   OR      one tablet daily       nitroglycerin 0.4 MG sublingual tablet    NITROSTAT    25 tablet    Place 1 tablet (0.4 mg) under the tongue every 5 minutes as needed for chest pain       OMEGA-3 FISH OIL PO

## 2017-06-19 NOTE — NURSING NOTE
"Chief Complaint   Patient presents with     URI       Initial /74  Pulse 67  Temp 98.5  F (36.9  C) (Tympanic)  Ht 5' 5\" (1.651 m)  Wt 216 lb (98 kg)  SpO2 96%  BMI 35.94 kg/m2 Estimated body mass index is 35.94 kg/(m^2) as calculated from the following:    Height as of this encounter: 5' 5\" (1.651 m).    Weight as of this encounter: 216 lb (98 kg).  Medication Reconciliation: complete  "

## 2017-08-09 ENCOUNTER — OFFICE VISIT (OUTPATIENT)
Dept: FAMILY MEDICINE | Facility: CLINIC | Age: 57
End: 2017-08-09
Payer: COMMERCIAL

## 2017-08-09 VITALS
OXYGEN SATURATION: 96 % | BODY MASS INDEX: 35.82 KG/M2 | DIASTOLIC BLOOD PRESSURE: 73 MMHG | WEIGHT: 215 LBS | SYSTOLIC BLOOD PRESSURE: 108 MMHG | HEIGHT: 65 IN | TEMPERATURE: 97.1 F | HEART RATE: 69 BPM

## 2017-08-09 DIAGNOSIS — R14.1 FLATULENCE, ERUCTATION, AND GAS PAIN: ICD-10-CM

## 2017-08-09 DIAGNOSIS — R14.3 FLATULENCE, ERUCTATION, AND GAS PAIN: ICD-10-CM

## 2017-08-09 DIAGNOSIS — K13.0 LIP DRYNESS: ICD-10-CM

## 2017-08-09 DIAGNOSIS — K59.09 OTHER CONSTIPATION: Primary | ICD-10-CM

## 2017-08-09 DIAGNOSIS — R14.2 FLATULENCE, ERUCTATION, AND GAS PAIN: ICD-10-CM

## 2017-08-09 LAB
FOLATE SERPL-MCNC: >100 NG/ML
VIT B12 SERPL-MCNC: 618 PG/ML (ref 193–986)

## 2017-08-09 PROCEDURE — 82607 VITAMIN B-12: CPT | Performed by: FAMILY MEDICINE

## 2017-08-09 PROCEDURE — 84443 ASSAY THYROID STIM HORMONE: CPT | Performed by: FAMILY MEDICINE

## 2017-08-09 PROCEDURE — 83516 IMMUNOASSAY NONANTIBODY: CPT | Performed by: FAMILY MEDICINE

## 2017-08-09 PROCEDURE — 36415 COLL VENOUS BLD VENIPUNCTURE: CPT | Performed by: FAMILY MEDICINE

## 2017-08-09 PROCEDURE — 80053 COMPREHEN METABOLIC PANEL: CPT | Performed by: FAMILY MEDICINE

## 2017-08-09 PROCEDURE — 82746 ASSAY OF FOLIC ACID SERUM: CPT | Performed by: FAMILY MEDICINE

## 2017-08-09 PROCEDURE — 99214 OFFICE O/P EST MOD 30 MIN: CPT | Performed by: FAMILY MEDICINE

## 2017-08-09 PROCEDURE — 83516 IMMUNOASSAY NONANTIBODY: CPT | Mod: 59 | Performed by: FAMILY MEDICINE

## 2017-08-09 RX ORDER — POLYETHYLENE GLYCOL 3350 17 G/17G
1 POWDER, FOR SOLUTION ORAL DAILY
Qty: 510 G | Refills: 1 | Status: SHIPPED | OUTPATIENT
Start: 2017-08-09 | End: 2018-05-02

## 2017-08-09 NOTE — LETTER
Carmen Blanca Joaquin  [address]  [place] [address]        August 14, 2017          Dear MsPavandmitry,    We are writing to inform you of your test results.    Normal vitamin B 12 and Folate level   Normal thyroid ( TSH)   Negative test for Tissue Transglutaminase Antibody IgA (test for celiac)   Normal  liver function tests, kidney functions,  and  electrolytes(various salts in your body)     Your blood sugar is quiet  elevated although it is non fasting. So recommend doing a follow up repeat fasting labs.   With normal fasting  blood sugar being under 100 and diabetes being diagnosed when the blood sugar is over 125.   Maintaining a normal weight and limiting simple carbohydrates are tools you can use to prevent diabetes. Simple carbohydrates are foods and drinks that are obviously sweet (i.e. desserts,sodas, and candy) and processed grains (i.e white flours and white rice). Brown rice and breads/ pastas made with whole grains are better diet choices to control blood sugar    Test results indicate you may require additional follow up, see comment below.    Resulted Orders   TSH with free T4 reflex   Result Value Ref Range    TSH 2.82 0.40 - 4.00 mU/L   Tissue transglutaminase noble IgA and IgG   Result Value Ref Range    Tissue Transglutaminase Antibody IgA  <7 U/mL     <1  Negative   The tTG-IgA assay has limited utility for patients with decreased levels of   IgA. Screening for celiac disease should include IgA testing to rule out   selective IgA deficiency and to guide selection and interpretation of   serological testing. tTG-IgG testing may be positive in celiac disease patients   with IgA deficiency.      Tissue Transglutaminase Noble IgG <1  Negative   <7 U/mL   Comprehensive metabolic panel   Result Value Ref Range    Sodium 141 133 - 144 mmol/L    Potassium 3.9 3.4 - 5.3 mmol/L    Chloride 110 (H) 94 - 109 mmol/L    Carbon Dioxide 24 20 - 32 mmol/L    Anion Gap 7 3 - 14 mmol/L    Glucose 215 (H) 70  - 99 mg/dL      Comment:      Fasting specimen    Urea Nitrogen 18 7 - 30 mg/dL    Creatinine 0.55 0.52 - 1.04 mg/dL    GFR Estimate >90  Non  GFR Calc   >60 mL/min/1.7m2    GFR Estimate If Black >90   GFR Calc   >60 mL/min/1.7m2    Calcium 8.9 8.5 - 10.1 mg/dL    Bilirubin Total 0.3 0.2 - 1.3 mg/dL    Albumin 3.8 3.4 - 5.0 g/dL    Protein Total 7.8 6.8 - 8.8 g/dL    Alkaline Phosphatase 112 40 - 150 U/L    ALT 46 0 - 50 U/L    AST 29 0 - 45 U/L   Vitamin B12   Result Value Ref Range    Vitamin B12 618 193 - 986 pg/mL   Folate   Result Value Ref Range    Folate >100.0 >5.4 ng/mL       If you have any questions or concerns, please call the clinic at the number listed above.       Sincerely,        Jackelyn Sims MD

## 2017-08-09 NOTE — MR AVS SNAPSHOT
After Visit Summary   8/9/2017    Carmen Nuñez    MRN: [id number]           Patient Information     Date Of Birth          1960        Visit Information        Provider Department      8/9/2017 9:40 AM Jackelyn Sims MD [facility] [place]        Today's Diagnoses     Other constipation    -  1    Flatulence, eructation, and gas pain        Lip dryness          Care Instructions      Constipation (Adult)  Constipation means that you have bowel movements that are less frequent than usual. Stools often become very hard and difficult to pass.  Constipation is very common. At some point in life it affects almost everyone. Since everyone's bowel habits are different, what is constipation to one person may not be to another. Your healthcare provider may do tests to diagnose constipation. It depends on what he or she finds when evaluating you.    Symptoms of constipation include:    Abdominal pain    Bloating    Vomiting    Painful bowel movements    Itching, swelling, bleeding, or pain around the anus  Causes  Constipation can have many causes. These include:    Diet low in fiber    Too much dairy    Not drinking enough liquids    Lack of exercise or physical activity. This is especially true for older adults.    Changes in lifestyle or daily routine, including pregnancy, aging, work, and travel    Frequent use or misuse of laxatives    Ignoring the urge to have a bowel movement or delaying it until later    Medicines, such as certain prescription pain medicines, iron supplements, antacids, certain antidepressants, and calcium supplements    Diseases like irritable bowel syndrome, bowel obstructions, stroke, diabetes, thyroid disease, Parkinson disease, hemorrhoids, and colon cancer  Complications  Potential complications of constipation can include:    Hemorrhoids    Rectal bleeding from hemorrhoids or anal fissures (skin tears)    Hernias    Dependency on laxatives    Chronic  constipation    Fecal impaction    Bowel obstruction or perforation  Home care  All treatment should be done after talking with your healthcare provider. This is especially true if you have another medical problems, are taking prescription medicines, or are an older adult. Treatment most often involves lifestyle changes. You may also need medicines. Your healthcare provider will tell you which will work best for you. Follow the advice below to help avoid this problem in the future.  Lifestyle changes  These lifestyle changes can help prevent constipation:    Diet. Eat a high-fiber diet, with fresh fruit and vegetables, and reduce dairy intake, meats, and processed foods    Fluids. It's important to get enough fluids each day. Drink plenty of water when you eat more fiber. If you are on diet that limits the amount of fluid you can have, talk about this with your healthcare provider.    Regular exercise. Check with your healthcare provider first.  Medications  Take any medicines as directed. Some laxatives are safe to use only every now and then. Others can be taken on a regular basis. Talk with your doctor or pharmacist if you have questions.  Prescription pain medicines can cause constipation. If you are taking this kind of medicine, ask your healthcare provider if you should also take a stool softener.  Medicines you may take to treat constipation include:    Fiber supplements    Stool softeners    Laxatives    Enemas    Rectal suppositories  Follow-up care  Follow up with your healthcare provider if symptoms don't get better in the next few days. You may need to have more tests or see a specialist.  Call 911  Call 911 if any of these occur:    Trouble breathing    Stiff, rigid abdomen that is severely painful to touch    Confusion    Fainting or loss of consciousness    Rapid heart rate    Chest pain  When to seek medical advice  Call your healthcare provider right away if any of these occur:    Fever over 100.4 F  (38 C)    Failure to resume normal bowel movements    Pain in your abdomen or back gets worse    Nausea or vomiting    Swelling in your abdomen    Blood in the stool    Black, tarry stool    Involuntary weight loss    Weakness  Date Last Reviewed: 12/30/2015 2000-2017 [email or web address]. [address] [place] [address], [place] [address]. All rights reserved. This information is not intended as a substitute for professional medical care. Always follow your healthcare professional's instructions.        Eating a High-Fiber Diet  Fiber is what gives strength and structure to plants. Most grains, beans, vegetables, and fruits contain fiber. Foods rich in fiber are often low in calories and fat, and they fill you up more. They may also reduce your risks for certain health problems. To find out the amount of fiber in canned, packaged, or frozen foods, read the Nutrition Facts label. It tells you how much fiber is in a serving.    Types of fiber and their benefits  There are two types of fiber: insoluble and soluble. They both aid digestion and help you maintain a healthy weight.    Insoluble fiber. This is found in whole grains, cereals, certain fruits and vegetables such as apple skin, corn, and carrots. Insoluble fiber may prevent constipation and reduce the risk for certain types of cancer.    Soluble fiber. This type of fiber is in oats, beans, and certain fruits and vegetables such as strawberries and peas. Soluble fiber can reduce cholesterol, which may help lower the risk for heart disease. It also helps control blood sugar levels.  Look for high-fiber foods  Try these foods to add fiber to your diet:    Whole-grain breads and cereals. Try to eat 6 to 8 ounces a day. Include wheat and oat bran cereals, whole-wheat muffins or toast, and corn tortillas in your meals.    Fruits. Try to eat 2 cups a day. Apples, oranges, strawberries, pears, and bananas are good sources. (Note: Fruit juice is low in  fiber.)    Vegetables. Try to eat at least 2.5 cups a day. Add asparagus, carrots, broccoli, peas, and corn to your meals.    Beans. One cup of cooked lentils gives you over 15 grams of fiber. Try navy beans, lentils, and chickpeas.    Seeds. A small handful of seeds gives you about 3 grams of fiber. Try sunflower seeds.  Keep track of your fiber  Keep track of how much fiber you eat. Start by reading food labels. Then eat a variety of foods high in fiber. As you begin to eat more fiber, ask your healthcare provider how much water you should be drinking to keep your digestive system working smoothly.  You should aim for a certain amount of fiber in your diet each day. If you are a woman, that amount is between 25 and 28 grams per day. Men should aim for 30 to 33 grams per day. After age 50, your daily fiber needs drop to 22 grams for women and 28 grams for men.  Before you reach for the fiber supplements, think about this. Fiber is found naturally in healthy whole foods. It gives you that feeling of fullness after you eat. Taking fiber supplements or eating fiber-enriched foods will not give you this full feeling.  Your fiber intake is a good measure for the quality of your overall diet. If you are missing out on your daily amount of fiber, you may be lacking other important nutrients as well.  Date Last Reviewed: 5/11/2015 2000-2017 [employer]. [address] [place] [address], [place] [address]. All rights reserved. This information is not intended as a substitute for professional medical care. Always follow your healthcare professional's instructions.                Follow-ups after your visit        Who to contact     If you have questions or need follow up information about today's clinic visit or your schedule please contact [facility] [place] directly at [phone].  Normal or non-critical lab and imaging results will be communicated to you by MyChart, letter or phone within 4 business  "days after the clinic has received the results. If you do not hear from us within 7 days, please contact the clinic through [email or web address] or phone. If you have a critical or abnormal lab result, we will notify you by phone as soon as possible.  Submit refill requests through [email or web address] or call your pharmacy and they will forward the refill request to us. Please allow 3 business days for your refill to be completed.          Additional Information About Your Visit        [email or web address] Information     [email or web address] lets you send messages to your doctor, view your test results, renew your prescriptions, schedule appointments and more. To sign up, go to [email or web address] . Click on \"Log in\" on the left side of the screen, which will take you to the Welcome page. Then click on \"Sign up Now\" on the right side of the page.     You will be asked to enter the access code listed below, as well as some personal information. Please follow the directions to create your username and password.     Your access code is: [email or web address]  Expires: 2017  4:39 PM     Your access code will  in 90 days. If you need help or a new code, please call your [facility] clinic or [phone].        Care EveryWhere ID     This is your Care EveryWhere ID. This could be used by other organizations to access your [facility] medical records  [id number]        Your Vitals Were     Pulse Temperature Height Pulse Oximetry BMI (Body Mass Index)       69 97.1  F (36.2  C) (Tympanic) 5' 5\" (1.651 m) 96% 35.78 kg/m2        Blood Pressure from Last 3 Encounters:   17 108/73   17 120/74   17 121/74    Weight from Last 3 Encounters:   17 215 lb (97.5 kg)   17 216 lb (98 kg)   17 219 lb (99.3 kg)              We Performed the Following     Comprehensive metabolic panel     Folate     Tissue transglutaminase noble IgA and IgG     TSH with free T4 reflex     Vitamin B12          Today's Medication Changes          These changes are " accurate as of: 8/9/17 10:41 AM.  If you have any questions, ask your nurse or doctor.               Start taking these medicines.        Dose/Directions    polyethylene glycol powder   Commonly known as:  MIRALAX   Used for:  Other constipation   Started by:  Jackelyn Sims MD        Dose:  1 capful   Take 17 g (1 capful) by mouth daily   Quantity:  510 g   Refills:  1            Where to get your medicines      These medications were sent to [id number] - [place], [place] - [address]  [address] [facility] [place] [address]     Phone:  [phone]     polyethylene glycol powder                Primary Care Provider Office Phone # Fax #    Jackelyn Sims [phone]       [address]  [place] [address]        Equal Access to Services     [facility]: Hadii pam lord hadasho Soomaali, waaxda luqadaha, qaybta kaalmada adeegyada, zeina salamanca . So [facility] [phone].    ATENCIÓN: Si habla español, tiene a vu disposición servicios gratuitos de asistencia lingüística. [facility] [phone].    We comply with applicable federal civil rights laws and [place] laws. We do not discriminate on the basis of race, color, national origin, age, disability sex, sexual orientation or gender identity.            Thank you!     Thank you for choosing [facility] [place]  for your care. Our goal is always to provide you with excellent care. Hearing back from our patients is one way we can continue to improve our services. Please take a few minutes to complete the written survey that you may receive in the mail after your visit with us. Thank you!             Your Updated Medication List - Protect others around you: Learn how to safely use, store and throw away your medicines at www.disposemymeds.org.          This list is accurate as of: 8/9/17 10:41 AM.  Always use your most recent med list.                   Brand Name Dispense Instructions for  use Diagnosis    amitriptyline 10 MG tablet    ELAVIL    90 tablet    Take 2 tablets by mouth At Bedtime.        aspirin 81 MG tablet      Take 81 mg by mouth daily        atorvastatin 20 MG tablet    LIPITOR    90 tablet    Take 1 tablet (20 mg) by mouth daily    Hyperlipidemia LDL goal <70       calcium 500 MG Chew      1 per day        citalopram 40 MG tablet    celeXA    90 tablet    Take 1 tablet (40 mg) by mouth At Bedtime    Adjustment disorder with mixed anxiety and depressed mood       clonazePAM 2 MG tablet    klonoPIN    60 tablet    Take 2 mg by mouth At Bedtime        desonide 0.05 % cream    DESOWEN    15 g    Apply sparingly to affected area three times daily for 14 days.    Eyelid dermatitis, eczematous       fluticasone 50 MCG/ACT spray    FLONASE    1 Bottle    Spray 1-2 sprays into both nostrils daily    Seasonal allergic rhinitis due to other allergic trigger       metoprolol 50 MG 24 hr tablet    TOPROL-XL    90 tablet    Take 1 tablet (50 mg) by mouth daily    Coronary artery disease involving other coronary artery bypass graft with other forms of angina pectoris (H)       MULTIVITAMIN TABS   OR      one tablet daily        nitroGLYcerin 0.4 MG sublingual tablet    NITROSTAT    25 tablet    Place 1 tablet (0.4 mg) under the tongue every 5 minutes as needed for chest pain    Non-ST elevation myocardial infarction, subsequent care episode (H)       OMEGA-3 FISH OIL PO           polyethylene glycol powder    MIRALAX    510 g    Take 17 g (1 capful) by mouth daily    Other constipation

## 2017-08-09 NOTE — PROGRESS NOTES
SUBJECTIVE:                                                    Carmen Nuñez is a 57 year old female who presents to clinic today for the following health issues:      Gastrointestinal symptoms      Duration: 5 years , chronic ongoing sx     Description:           ABDOMINAL Bloated.   Pain is described as aching and bloated and radiates .           Diarrhea and constipation alternating, although mostly constipated and sometimes does not go for few days and then it can follow the diarrhea  . No blood in stool . No nausea , vomiting , sometimes has heart burn but not frequent , take occasional Tums   Denies any stress etc otherwise and she thinsk she is doing ok on her current med's . No FAM hx of colon disease but mom had similar GI  issue especially with constipation etc  Had colonoscopy last yr and has  polyp , but was told to 5 yr follow up .       Intensity:  mild    Accompanying signs and symptoms:  nausea, diarrhea, constipation, painful bowel movements and bloating, some fatigue , weight gain     History   Previous {similar problem: YES  Previous evaluation:  EGD, colonoscopy and CT abdomen, many  yrs ago, in the beginning when she was having these sx,  it was all normal     Aggravating factors: none    Alleviating factors: OTC stool softener     Other Therapies tried: None    Lip Problem       Duration: 2 weeks     Description (location/character/radiation): peeling skin on upper lip , slight itching , no lesion inside mouth     Intensity:  severe    Accompanying signs and symptoms: sensitive, redness , mild pain , no associated fever chills or uri sx     History (similar episodes/previous evaluation): None    Precipitating or alleviating factors: Not sure what may have cause it     Therapies tried and outcome: OTC blister palm , it is helping                Problem list and histories reviewed & adjusted, as indicated.  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, in remission     Allergic rhinitis due to other allergen     Acute gastritis     Carpal tunnel syndrome     Obesity     Hyperlipidemia LDL goal <70     Non-ST elevation myocardial infarction, subsequent care episode     Impingement syndrome of right shoulder     History of colonic polyps     Advanced directives, counseling/discussion     Coronary artery disease     Cardiomyopathy (H)     Nonspecific abnormal results of liver function study     S/P rotator cuff repair     Adjustment disorder with mixed anxiety and depressed mood     BPPV (benign paroxysmal positional vertigo), unspecified laterality     Brain aneurysm     Acute left ankle pain     Acute pain of left knee     Tricuspid valve disorders, non-rheumatic     Past Surgical History:   Procedure Laterality Date     C APPENDECTOMY  1985    incidental     C NONSPECIFIC PROCEDURE  1985    colposcopy/cryotherapy of cervix for abnl pap smear     C NONSPECIFIC PROCEDURE  1992    clipping ruptured cerebral aneurysm     C NONSPECIFIC PROCEDURE  10/05    UGI with SBFT nl     C VAGINAL HYSTERECTOMY  04/03    partial ovaries remain     CHOLECYSTECTOMY, OPEN  1985     CORONARY ANGIOGRAPHY ADULT ORDER  1-20-11      New critical left main stenosis of 80-90% , CABG recommended     CORONARY ARTERY BYPASS  1-24-11    X3 1/11 LIMA to LAD, SVG to OM, SVG aorta to distal RCA     CRANIOTOMY      resection on brai aneurysm at age 31      CRANIOTOMY, REPAIR ANEURYSM, COMBINED      at age 31      HC COLONOSCOPY THRU STOMA, DIAGNOSTIC  10/05    bx of terminal ileal nodules benign     HC UGI ENDOSCOPY DIAG W OR W/O BRUSH/WASH  10/05    erosive gastritis, neg sprue bx     HEART CATH, ANGIOPLASTY  Nov 2010    intracoronary stenting in the mid LAD.       HYSTERECTOMY, PAP NO LONGER INDICATED       HYSTERECTOMY, PAP NO LONGER INDICATED       TONSILLECTOMY      tonsillectomy      "  Social History   Substance Use Topics     Smoking status: Former Smoker     Packs/day: 2.00     Years: 22.00     Types: Cigarettes     Quit date: 1/1/1994     Smokeless tobacco: Never Used     Alcohol use No      Comment: quit 1994     Family History   Problem Relation Age of Onset     C.A.D. Mother      HEART DISEASE Mother      heart disease     CEREBROVASCULAR DISEASE Mother      age 70s     DIABETES Mother      type 2     Hypertension Mother      Lipids Mother      Myocardial Infarction Mother      CEREBROVASCULAR DISEASE Father      age mid 70s     CANCER Father      breast     Breast Cancer Father      onset age 72     Alcohol/Drug Father      Cardiovascular Father      abdominal aortic aneurysm, smoker     Myocardial Infarction Brother      2              Reviewed and updated as needed this visit by clinical staff       Reviewed and updated as needed this visit by Provider         ROS:  Constitutional, HEENT, cardiovascular, pulmonary, GI, , musculoskeletal, neuro, skin, endocrine and psych systems are negative, except as otherwise noted.      OBJECTIVE:   /73  Pulse 69  Temp 97.1  F (36.2  C) (Tympanic)  Ht 5' 5\" (1.651 m)  Wt 215 lb (97.5 kg)  SpO2 96%  BMI 35.78 kg/m2  Body mass index is 35.78 kg/(m^2).  GENERAL: healthy, alert and no distress  EYES: Eyes grossly normal to inspection, PERRL and conjunctivae and sclerae normal  HENT: oropharynx clear, oral mucous membranes moist and lipid with just dry peeling skin minimal erythema, no vesicles or blisters etc   NECK: no adenopathy, no asymmetry, masses, or scars and thyroid normal to palpation  RESP: lungs clear to auscultation - no rales, rhonchi or wheezes  CV: regular rate and rhythm, normal S1 S2, no S3 or S4, no murmur, click or rub, no peripheral edema and peripheral pulses strong  ABDOMEN: soft, nontender, no hepatosplenomegaly, no masses and bowel sounds normal, no CVA tenderness   MS: no edema        ASSESSMENT/PLAN: "         (K59.09) Other constipation  (primary encounter diagnosis)  Comment:   Plan: TSH with free T4 reflex, polyethylene glycol         (MIRALAX) powder            (R14.3,  R14.1,  R14.2) Flatulence, eructation, and gas pain  Comment:   Plan: Tissue transglutaminase noble IgA and IgG,         Comprehensive metabolic panel  . discussed high fibre / fluid diet to regulate Bm.  Gave and miralax  to help. Talked about warning s/s for which should be seen. Cares and symptomatic treatment discussed follow up if  recurrent or ongoing problem , consider further evaluation if needed.       (K13.0) Lip dryness  Comment:   Plan: Vitamin B12, Folate        may use OTC 1 hydrocortisone to see if helps. Skin Cares and symptomatic treatment discussed follow up if problem      Patient expressed understanding and agreement with treatment plan. All patient's questions were answered, will let me know if has more later.  Medications: Rx's: Reviewed the potential side effects/complications of medications prescribed.   Check labs  Cares and symptomatic treatment discussed follow up if problem         Jackelyn Sims MD  [facility]

## 2017-08-09 NOTE — NURSING NOTE
"Chief Complaint   Patient presents with     Bowel Problems     Mouth/Lip Problem       Initial /73  Pulse 69  Temp 97.1  F (36.2  C) (Tympanic)  Ht 5' 5\" (1.651 m)  Wt 215 lb (97.5 kg)  SpO2 96%  BMI 35.78 kg/m2 Estimated body mass index is 35.78 kg/(m^2) as calculated from the following:    Height as of this encounter: 5' 5\" (1.651 m).    Weight as of this encounter: 215 lb (97.5 kg).  Medication Reconciliation: complete  "

## 2017-08-09 NOTE — PATIENT INSTRUCTIONS
Constipation (Adult)  Constipation means that you have bowel movements that are less frequent than usual. Stools often become very hard and difficult to pass.  Constipation is very common. At some point in life it affects almost everyone. Since everyone's bowel habits are different, what is constipation to one person may not be to another. Your healthcare provider may do tests to diagnose constipation. It depends on what he or she finds when evaluating you.    Symptoms of constipation include:    Abdominal pain    Bloating    Vomiting    Painful bowel movements    Itching, swelling, bleeding, or pain around the anus  Causes  Constipation can have many causes. These include:    Diet low in fiber    Too much dairy    Not drinking enough liquids    Lack of exercise or physical activity. This is especially true for older adults.    Changes in lifestyle or daily routine, including pregnancy, aging, work, and travel    Frequent use or misuse of laxatives    Ignoring the urge to have a bowel movement or delaying it until later    Medicines, such as certain prescription pain medicines, iron supplements, antacids, certain antidepressants, and calcium supplements    Diseases like irritable bowel syndrome, bowel obstructions, stroke, diabetes, thyroid disease, Parkinson disease, hemorrhoids, and colon cancer  Complications  Potential complications of constipation can include:    Hemorrhoids    Rectal bleeding from hemorrhoids or anal fissures (skin tears)    Hernias    Dependency on laxatives    Chronic constipation    Fecal impaction    Bowel obstruction or perforation  Home care  All treatment should be done after talking with your healthcare provider. This is especially true if you have another medical problems, are taking prescription medicines, or are an older adult. Treatment most often involves lifestyle changes. You may also need medicines. Your healthcare provider will tell you which will work best for you. Follow  the advice below to help avoid this problem in the future.  Lifestyle changes  These lifestyle changes can help prevent constipation:    Diet. Eat a high-fiber diet, with fresh fruit and vegetables, and reduce dairy intake, meats, and processed foods    Fluids. It's important to get enough fluids each day. Drink plenty of water when you eat more fiber. If you are on diet that limits the amount of fluid you can have, talk about this with your healthcare provider.    Regular exercise. Check with your healthcare provider first.  Medications  Take any medicines as directed. Some laxatives are safe to use only every now and then. Others can be taken on a regular basis. Talk with your doctor or pharmacist if you have questions.  Prescription pain medicines can cause constipation. If you are taking this kind of medicine, ask your healthcare provider if you should also take a stool softener.  Medicines you may take to treat constipation include:    Fiber supplements    Stool softeners    Laxatives    Enemas    Rectal suppositories  Follow-up care  Follow up with your healthcare provider if symptoms don't get better in the next few days. You may need to have more tests or see a specialist.  Call 911  Call 911 if any of these occur:    Trouble breathing    Stiff, rigid abdomen that is severely painful to touch    Confusion    Fainting or loss of consciousness    Rapid heart rate    Chest pain  When to seek medical advice  Call your healthcare provider right away if any of these occur:    Fever over 100.4 F (38 C)    Failure to resume normal bowel movements    Pain in your abdomen or back gets worse    Nausea or vomiting    Swelling in your abdomen    Blood in the stool    Black, tarry stool    Involuntary weight loss    Weakness  Date Last Reviewed: 12/30/2015 2000-2017 [employer]. [address] [place] [address], [place] [address]. All rights reserved. This information is not intended as a substitute for  professional medical care. Always follow your healthcare professional's instructions.        Eating a High-Fiber Diet  Fiber is what gives strength and structure to plants. Most grains, beans, vegetables, and fruits contain fiber. Foods rich in fiber are often low in calories and fat, and they fill you up more. They may also reduce your risks for certain health problems. To find out the amount of fiber in canned, packaged, or frozen foods, read the Nutrition Facts label. It tells you how much fiber is in a serving.    Types of fiber and their benefits  There are two types of fiber: insoluble and soluble. They both aid digestion and help you maintain a healthy weight.    Insoluble fiber. This is found in whole grains, cereals, certain fruits and vegetables such as apple skin, corn, and carrots. Insoluble fiber may prevent constipation and reduce the risk for certain types of cancer.    Soluble fiber. This type of fiber is in oats, beans, and certain fruits and vegetables such as strawberries and peas. Soluble fiber can reduce cholesterol, which may help lower the risk for heart disease. It also helps control blood sugar levels.  Look for high-fiber foods  Try these foods to add fiber to your diet:    Whole-grain breads and cereals. Try to eat 6 to 8 ounces a day. Include wheat and oat bran cereals, whole-wheat muffins or toast, and corn tortillas in your meals.    Fruits. Try to eat 2 cups a day. Apples, oranges, strawberries, pears, and bananas are good sources. (Note: Fruit juice is low in fiber.)    Vegetables. Try to eat at least 2.5 cups a day. Add asparagus, carrots, broccoli, peas, and corn to your meals.    Beans. One cup of cooked lentils gives you over 15 grams of fiber. Try navy beans, lentils, and chickpeas.    Seeds. A small handful of seeds gives you about 3 grams of fiber. Try sunflower seeds.  Keep track of your fiber  Keep track of how much fiber you eat. Start by reading food labels. Then eat a  variety of foods high in fiber. As you begin to eat more fiber, ask your healthcare provider how much water you should be drinking to keep your digestive system working smoothly.  You should aim for a certain amount of fiber in your diet each day. If you are a woman, that amount is between 25 and 28 grams per day. Men should aim for 30 to 33 grams per day. After age 50, your daily fiber needs drop to 22 grams for women and 28 grams for men.  Before you reach for the fiber supplements, think about this. Fiber is found naturally in healthy whole foods. It gives you that feeling of fullness after you eat. Taking fiber supplements or eating fiber-enriched foods will not give you this full feeling.  Your fiber intake is a good measure for the quality of your overall diet. If you are missing out on your daily amount of fiber, you may be lacking other important nutrients as well.  Date Last Reviewed: 5/11/2015 2000-2017 [employer]. [address] [place] [address], [place] [address]. All rights reserved. This information is not intended as a substitute for professional medical care. Always follow your healthcare professional's instructions.

## 2017-08-10 LAB
ALBUMIN SERPL-MCNC: 3.8 G/DL (ref 3.4–5)
ALP SERPL-CCNC: 112 U/L (ref 40–150)
ALT SERPL W P-5'-P-CCNC: 46 U/L (ref 0–50)
ANION GAP SERPL CALCULATED.3IONS-SCNC: 7 MMOL/L (ref 3–14)
AST SERPL W P-5'-P-CCNC: 29 U/L (ref 0–45)
BILIRUB SERPL-MCNC: 0.3 MG/DL (ref 0.2–1.3)
BUN SERPL-MCNC: 18 MG/DL (ref 7–30)
CALCIUM SERPL-MCNC: 8.9 MG/DL (ref 8.5–10.1)
CHLORIDE SERPL-SCNC: 110 MMOL/L (ref 94–109)
CO2 SERPL-SCNC: 24 MMOL/L (ref 20–32)
CREAT SERPL-MCNC: 0.55 MG/DL (ref 0.52–1.04)
GFR SERPL CREATININE-BSD FRML MDRD: ABNORMAL ML/MIN/1.7M2
GLUCOSE SERPL-MCNC: 215 MG/DL (ref 70–99)
POTASSIUM SERPL-SCNC: 3.9 MMOL/L (ref 3.4–5.3)
PROT SERPL-MCNC: 7.8 G/DL (ref 6.8–8.8)
SODIUM SERPL-SCNC: 141 MMOL/L (ref 133–144)
TSH SERPL DL<=0.005 MIU/L-ACNC: 2.82 MU/L (ref 0.4–4)

## 2017-08-11 LAB
TTG IGA SER-ACNC: NORMAL U/ML
TTG IGG SER-ACNC: NORMAL U/ML

## 2017-08-22 ENCOUNTER — TELEPHONE (OUTPATIENT)
Dept: FAMILY MEDICINE | Facility: CLINIC | Age: 57
End: 2017-08-22

## 2017-08-22 ENCOUNTER — OFFICE VISIT (OUTPATIENT)
Dept: FAMILY MEDICINE | Facility: CLINIC | Age: 57
End: 2017-08-22
Payer: COMMERCIAL

## 2017-08-22 VITALS
HEIGHT: 65 IN | HEART RATE: 77 BPM | OXYGEN SATURATION: 96 % | TEMPERATURE: 98 F | SYSTOLIC BLOOD PRESSURE: 113 MMHG | WEIGHT: 215 LBS | DIASTOLIC BLOOD PRESSURE: 76 MMHG | BODY MASS INDEX: 35.82 KG/M2

## 2017-08-22 DIAGNOSIS — R19.7 DIARRHEA, UNSPECIFIED TYPE: Primary | ICD-10-CM

## 2017-08-22 DIAGNOSIS — K92.1 BLOOD IN STOOL: ICD-10-CM

## 2017-08-22 DIAGNOSIS — K62.89 ANAL IRRITATION: ICD-10-CM

## 2017-08-22 LAB
ERYTHROCYTE [DISTWIDTH] IN BLOOD BY AUTOMATED COUNT: 13.4 % (ref 10–15)
HCT VFR BLD AUTO: 41.3 % (ref 35–47)
HGB BLD-MCNC: 13.6 G/DL (ref 11.7–15.7)
MCH RBC QN AUTO: 28.7 PG (ref 26.5–33)
MCHC RBC AUTO-ENTMCNC: 32.9 G/DL (ref 31.5–36.5)
MCV RBC AUTO: 87 FL (ref 78–100)
PLATELET # BLD AUTO: 214 10E9/L (ref 150–450)
RBC # BLD AUTO: 4.74 10E12/L (ref 3.8–5.2)
WBC # BLD AUTO: 8.1 10E9/L (ref 4–11)

## 2017-08-22 PROCEDURE — 36415 COLL VENOUS BLD VENIPUNCTURE: CPT | Performed by: FAMILY MEDICINE

## 2017-08-22 PROCEDURE — 99214 OFFICE O/P EST MOD 30 MIN: CPT | Performed by: FAMILY MEDICINE

## 2017-08-22 PROCEDURE — 85027 COMPLETE CBC AUTOMATED: CPT | Performed by: FAMILY MEDICINE

## 2017-08-22 NOTE — TELEPHONE ENCOUNTER
Patient was prescribed Miralax on 8/9/17 for constipation.  States that she started Miralax for a few days now.  Started to have blooding diarrhea yesterday (bright red blood in stool).  Stopped Miralax yesterday  Last episode of bloody diarrhea this morning.  No episode since then.  Report feeling tired due to diarrhea  Denies SOB, dizziness and severe weakness.   appt scheduled to see Dr. Sims at 4 p,m today.       Next 5 appointments (look out 90 days)     Aug 22, 2017  4:00 PM CDT   Office Visit with Jackelyn Sims MD   [facility] ([facility])    [address] [place] [id number] [phone]                Brinda Hu RN

## 2017-08-22 NOTE — LETTER
August 24, 2017      Carmen Blanca Joaquin  [address] [facility] [address]        Dear ,    We are writing to inform you of your test results.    Normal cbc- complete blood count including Hemoglobin,RBC Count,White blood cell count (wbc)   Your test results fall within the expected range(s) or remain unchanged from previous results.  Please continue with current treatment plan.    Resulted Orders   CBC with platelets   Result Value Ref Range    WBC 8.1 4.0 - 11.0 10e9/L    RBC Count 4.74 3.8 - 5.2 10e12/L    Hemoglobin 13.6 11.7 - 15.7 g/dL    Hematocrit 41.3 35.0 - 47.0 %    MCV 87 78 - 100 fl    MCH 28.7 26.5 - 33.0 pg    MCHC 32.9 31.5 - 36.5 g/dL    RDW 13.4 10.0 - 15.0 %    Platelet Count 214 150 - 450 10e9/L       If you have any questions or concerns, please call the clinic at the number listed above.       Sincerely,        Jackelyn Sims MD

## 2017-08-22 NOTE — PATIENT INSTRUCTIONS
Uncertain Causes of Diarrhea (Adult)    Diarrhea is when stools are loose and watery. This can be caused by:    Viral infections    Bacterial infections    Food poisoning    Parasites    Irritable bowel syndrome (IBS)    Inflammatory bowel diseases such as ulcerative colitis, Crohn's disease, and celiac disease    Food intolerance, such as to lactose, the sugar found in milk and milk products    Reaction to medicines like antibiotics, laxatives, cancer drugs, and antacids  Along with diarrhea, you may also have:    Abdominal pain and cramping    Nausea and vomiting    Loss of bowel control    Fever and chills    Bloody stools  In some cases, antibiotics may help to treat diarrhea. You may have a stool sample test. This is done to see what is causing your diarrhea, and if antibiotics will help treat it. The results of a stool sample test may take up to 2 days. The healthcare provider may not give you antibiotics until he or she has the stool test results.  Diarrhea can cause dehydration. This is the loss of too much water and other fluids from the body. When this occurs, body fluid must be replaced. This can be done with oral rehydration solutions. Oral rehydration solutions are available at drugstores and grocery stores without a prescription.  Home care  Follow all instructions given by your healthcare provider. Rest at home for the next 24 hours, or until you feel better. Avoid caffeine, tobacco, and alcohol. These can make diarrhea, cramping, and pain worse.  If taking medicines:    Don t take over-the-counter diarrhea or nausea medicines unless your healthcare provider tells you to.    You may use acetaminophen or NSAID medicines like ibuprofen or naproxen to reduce pain and fever. Don t use these if you have chronic liver or kidney disease, or ever had a stomach ulcer or gastrointestinal bleeding. Don't use NSAID medicines if you are already taking one for another condition (like arthritis) or are on daily  aspirin therapy (such as for heart disease or after a stroke). Talk with your healthcare provider first.    If antibiotics were prescribed, be sure you take them until they are finished. Don t stop taking them even when you feel better. Antibiotics must be taken as a full course.  To prevent the spread of illness:    Remember that washing with soap and water and using alcohol-based  is the best way to prevent the spread of infection.    Clean the toilet after each use.    Wash your hands before eating.    Wash your hands before and after preparing food. Keep in mind that people with diarrhea or vomiting should not prepare food for others.    Wash your hands after using cutting boards, countertops, and knives that have been in contact with raw foods.    Wash and then peel fruits and vegetables.    Keep uncooked meats away from cooked and ready-to-eat foods.    Use a food thermometer when cooking. Cook poultry to at least 165 F (74 C). Cook ground meat (beef, veal, pork, lamb) to at least 160 F (71 C). Cook fresh beef, veal, lamb, and pork to at least 145 F (63 C).    Don t eat raw or undercooked eggs (poached or agustín side up), poultry, meat, or unpasteurized milk and juices.  Food and drinks  The main goal while treating vomiting or diarrhea is to prevent dehydration. This is done by taking small amounts of liquids often.    Keep in mind that liquids are more important than food right now.    Drink only small amounts of liquids at a time.    Don t force yourself to eat, especially if you are having cramping, vomiting, or diarrhea. Don t eat large amounts at a time, even if you are hungry.    If you eat, avoid fatty, greasy, spicy, or fried foods.    Don t eat dairy foods or drink milk if you have diarrhea. These can make diarrhea worse.  During the first 24 hours you can try:    Oral rehydration solutions. Do not use sports drinks. They have too much sugar and not enough electrolytes.    Soft drinks without  caffeine    Ginger ale    Water (plain or flavored)    Decaf tea or coffee    Clear broth, consommé, or bouillon    Gelatin, popsicles, or frozen fruit juice bars  The second 24 hours, if you are feeling better, you can add:    Hot cereal, plain toast, bread, rolls, or crackers    Plain noodles, rice, mashed potatoes, chicken noodle soup, or rice soup    Unsweetened canned fruit (no pineapple)    Bananas  As you recover:    Limit fat intake to less than 15 grams per day. Don t eat margarine, butter, oils, mayonnaise, sauces, gravies, fried foods, peanut butter, meat, poultry, or fish.    Limit fiber. Don t eat raw or cooked vegetables, fresh fruits except bananas, or bran cereals.    Limit caffeine and chocolate.    Limit dairy.    Don t use spices or seasonings except salt.    Go back to your normal diet over time, as you feel better and your symptoms improve.    If the symptoms come back, go back to a simple diet or clear liquids.  Follow-up care  Follow up with your healthcare provider, or as advised. If a stool sample was taken or cultures were done, call the healthcare provider for the results as instructed.  Call 911  Call 911 if you have any of these symptoms:    Trouble breathing    Confusion    Extreme drowsiness or trouble walking    Loss of consciousness    Rapid heart rate    Chest pain    Stiff neck    Seizure  When to seek medical advice  Call your healthcare provider right away if any of these occur:    Abdominal pain that gets worse    Constant lower right abdominal pain    Continued vomiting and inability to keep liquids down    Diarrhea more than 5 times a day    Blood in vomit or stool    Dark urine or no urine for 8 hours, dry mouth and tongue, tiredness, weakness, or dizziness    Drowsiness    New rash    You don t get better in 2 to 3 days    Fever of 100.4 F (38 C) or higher that doesn t get lower with medicine  Date Last Reviewed: 1/3/2016    [id number] [employer]. [address] [place] [address]  [place] [address]. All rights reserved. This information is not intended as a substitute for professional medical care. Always follow your healthcare professional's instructions.        Understanding Rectal Bleeding    Rectal bleeding is when blood passes through your rectum and anus. It can happen with or without a bowel movement. Rectal bleeding may be a sign of a serious problem in your rectum, colon, or upper GI tract. Call your healthcare provider right away if you have any rectal bleeding.  The GI Tract  The gastrointestinal (GI) tract includes the mouth, esophagus, stomach, small intestine, large intestine (colon), rectum, and anus. The food you eat is digested as it passes through the GI tract. Solid waste leaves the body through the rectum.   Rectal bleeding and GI problems  The cause of rectal bleeding may be found in any region of the GI tract. The colon or rectum may be the site of your bleeding problem. Or, bleeding may be due to problems farther up the GI tract, such as in the small intestine, duodenum, or stomach.  Causes of rectal bleeding  Rectal bleeding causes include the following:    Hemorrhoids (swollen veins in the rectum and anus)    Fissures (tears in or near the anus)    Diverticulosis (inflamed pockets in the colon wall)    Infection    Ischemia (low blood flow)    Radiation damage    Inflammatory bowel disease (Crohn's disease or ulcerative colitis)    Ulcers in the upper GI tract and inflammation of the large intestine    Abnormal tissue growths (tumors or polyps) in the GI tract    A bulging rectum (also called a rectal prolapse)    Abnormal blood vessels in the small intestine or in the colon  Common symptoms  Common symptoms include the following:    Rectal pain, itching, or soreness    Belly pain or epigastric pain    Minor occasional drops of blood that appear on the stool or toilet paper, to greater amounts of stool that appear black or tarry   Rectal bleeding can also  happen without pain.  Date Last Reviewed: 7/1/2016 2000-2017 [employer]. [address] [place] [address], [place] [address]. All rights reserved. This information is not intended as a substitute for professional medical care. Always follow your healthcare professional's instructions.

## 2017-08-22 NOTE — PROGRESS NOTES
SUBJECTIVE:   Carmen Nuñez is a 57 year old female who presents to clinic today for the following health issues:      Diarrhea  Onset:  1 day after taking three doses of  Tran lax      Description:   Consistency of stool: watery and runny. She is  usually constipated sometimes, so started miralax to regulate. First  couple of doses,  nothing happened . She thinks  after 3rd dose when she noticed change. Had  loose stool and slight more  frequent. Noticed some bright red blood.  had mild cramping but not a bad pain and not recurrent . No fever or chills, nausea vomiting etc . Last bm was earlier this morning and was smaller and no blood so she thinsk it is getting a little better    Blood in stool: YES- bright red blood with wiping and in the stool   Number of loose stools in past 24 hours: 5 yesterday and 2 bm today last one had no blood/not runny    Progression of Symptoms:  improving    Accompanying Signs & Symptoms:  Fever: no   Nausea or vomiting; no   Abdominal pain: YES- mild  Episodes of constipation: no   Weight loss: no   Dizziness :no     History:   Ill contacts: no   Recent use of antibiotics: no    Recent travels: no          Recent medication-new or changes(Rx or OTC): YES- MIRALAX powder     Precipitating factors:       Alleviating factors:       Therapies Tried and outcome:  ; Outcome:       Problem list and histories reviewed & adjusted, as indicated.  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, in remission     Allergic rhinitis due to other allergen     Acute gastritis     Carpal tunnel syndrome     Obesity     Hyperlipidemia LDL goal <70     Non-ST elevation myocardial infarction, subsequent care episode     Impingement syndrome of right shoulder     History of colonic polyps     Advanced directives, counseling/discussion     Coronary artery  disease     Cardiomyopathy (H)     Nonspecific abnormal results of liver function study     S/P rotator cuff repair     Adjustment disorder with mixed anxiety and depressed mood     BPPV (benign paroxysmal positional vertigo), unspecified laterality     Brain aneurysm     Acute left ankle pain     Acute pain of left knee     Tricuspid valve disorders, non-rheumatic     Other constipation     Past Surgical History:   Procedure Laterality Date     C APPENDECTOMY  1985    incidental     C NONSPECIFIC PROCEDURE  1985    colposcopy/cryotherapy of cervix for abnl pap smear     C NONSPECIFIC PROCEDURE  1992    clipping ruptured cerebral aneurysm     C NONSPECIFIC PROCEDURE  10/05    UGI with SBFT nl     C VAGINAL HYSTERECTOMY  04/03    partial ovaries remain     CHOLECYSTECTOMY, OPEN  1985     CORONARY ANGIOGRAPHY ADULT ORDER  1-20-11      New critical left main stenosis of 80-90% , CABG recommended     CORONARY ARTERY BYPASS  1-24-11    X3 1/11 LIMA to LAD, SVG to OM, SVG aorta to distal RCA     CRANIOTOMY      resection on brai aneurysm at age 31      CRANIOTOMY, REPAIR ANEURYSM, COMBINED      at age 31      HC COLONOSCOPY THRU STOMA, DIAGNOSTIC  10/05    bx of terminal ileal nodules benign     HC UGI ENDOSCOPY DIAG W OR W/O BRUSH/WASH  10/05    erosive gastritis, neg sprue bx     HEART CATH, ANGIOPLASTY  Nov 2010    intracoronary stenting in the mid LAD.       HYSTERECTOMY, PAP NO LONGER INDICATED       HYSTERECTOMY, PAP NO LONGER INDICATED       TONSILLECTOMY      tonsillectomy       Social History   Substance Use Topics     Smoking status: Former Smoker     Packs/day: 2.00     Years: 22.00     Types: Cigarettes     Quit date: 1/1/1994     Smokeless tobacco: Never Used     Alcohol use No      Comment: quit 1994     Family History   Problem Relation Age of Onset     C.A.D. Mother      HEART DISEASE Mother      heart disease     CEREBROVASCULAR DISEASE Mother      age 70s     DIABETES Mother      type 2     Hypertension  "Mother      Lipids Mother      Myocardial Infarction Mother      CEREBROVASCULAR DISEASE Father      age mid 70s     CANCER Father      breast     Breast Cancer Father      onset age 72     Alcohol/Drug Father      Cardiovascular Father      abdominal aortic aneurysm, smoker     Myocardial Infarction Brother      2              Reviewed and updated as needed this visit by clinical staff     Reviewed and updated as needed this visit by Provider         ROS:  Constitutional, HEENT, cardiovascular, pulmonary, GI, , musculoskeletal, neuro, skin, endocrine and psych systems are negative, except as otherwise noted.      OBJECTIVE:   /76  Pulse 77  Temp 98  F (36.7  C) (Tympanic)  Ht 5' 5\" (1.651 m)  Wt 215 lb (97.5 kg)  SpO2 96%  BMI 35.78 kg/m2  Body mass index is 35.78 kg/(m^2).  GENERAL: healthy, alert and no distress  HENT: oropharynx clear and oral mucous membranes moist  NECK: no adenopathy  RESP: lungs clear to auscultation - no rales, rhonchi or wheezes  CV: regular rate and rhythm, normal S1 S2, no S3 or S4,   ABDOMEN: soft, nontender, no hepatosplenomegaly, no masses and bowel sounds normal  RECTAL (female): normal sphincter tone, no rectal masses, no rectal  blood at this time although  Her perianal skin is really chapped and cracked and irritated   MS: no gross musculoskeletal defects noted, no edema        ASSESSMENT/PLAN:         (R19.7) Diarrhea, unspecified type  (primary encounter diagnosis)  Comment: likely related miralax use   Plan: : CBC with platelets            (K92.1) Blood in stool  Comment: likely related to diarrhea also has  anal irritation   Plan: CBC with platelets, CANCELED: CBC         with platelets            (K62.89) Anal irritation  Comment:   Plan: hydrocortisone (ANUSOL-HC) 2.5 % cream              Discussed possible differential diagnosis for her symptoms.  Cbc normal wbc and hg . clinically stable and  she is doing ok.She is S/p colonoscopy last year bc of hx of colon " polyps.   stop milalax.Talked about hydration, fluids, brat diet etc.   Talked about warning s/s for which should be seen. Also  if her sx do not resolve by next week, consider checking stools to look for any infectious cause of her diarrhea, if needed.     She will f/u if problem         Patient expressed understanding and agreement with treatment plan. All patient's questions were answered, will let me know if has more later.  Medications: Rx's: Reviewed the potential side effects/complications of medications prescribed.     Jackelyn Sims MD  [facility]

## 2017-08-22 NOTE — TELEPHONE ENCOUNTER
Reason for Call:  Medication or medication refill:    Do you use a [facility]?  Name of the pharmacy and phone number for the current request:    [facility] [place], [place] - [address]  [id number] - [place], [place] - [address] [facility]    Name of the medication requested: Anufol    Other request: Med not covered by insurance, wanted to know if doctor wanted to try something different     Can we leave a detailed message on this number? [phone]    Best Time: anytime    Call taken on 8/22/2017 at 5:02 PM by Lyla Moncada

## 2017-08-22 NOTE — MR AVS SNAPSHOT
After Visit Summary   8/22/2017    Carmen Nuñez    MRN: [id number]           Patient Information     Date Of Birth          1960        Visit Information        Provider Department      8/22/2017 4:00 PM Jackelyn Sims [place] [facility]        Today's Diagnoses     Diarrhea, unspecified type    -  1    Blood in stool        Anal irritation          Care Instructions      Uncertain Causes of Diarrhea (Adult)    Diarrhea is when stools are loose and watery. This can be caused by:    Viral infections    Bacterial infections    Food poisoning    Parasites    Irritable bowel syndrome (IBS)    Inflammatory bowel diseases such as ulcerative colitis, Crohn's disease, and celiac disease    Food intolerance, such as to lactose, the sugar found in milk and milk products    Reaction to medicines like antibiotics, laxatives, cancer drugs, and antacids  Along with diarrhea, you may also have:    Abdominal pain and cramping    Nausea and vomiting    Loss of bowel control    Fever and chills    Bloody stools  In some cases, antibiotics may help to treat diarrhea. You may have a stool sample test. This is done to see what is causing your diarrhea, and if antibiotics will help treat it. The results of a stool sample test may take up to 2 days. The healthcare provider may not give you antibiotics until he or she has the stool test results.  Diarrhea can cause dehydration. This is the loss of too much water and other fluids from the body. When this occurs, body fluid must be replaced. This can be done with oral rehydration solutions. Oral rehydration solutions are available at drugstores and grocery stores without a prescription.  Home care  Follow all instructions given by your healthcare provider. Rest at home for the next 24 hours, or until you feel better. Avoid caffeine, tobacco, and alcohol. These can make diarrhea, cramping, and pain worse.  If taking medicines:    Don t take  over-the-counter diarrhea or nausea medicines unless your healthcare provider tells you to.    You may use acetaminophen or NSAID medicines like ibuprofen or naproxen to reduce pain and fever. Don t use these if you have chronic liver or kidney disease, or ever had a stomach ulcer or gastrointestinal bleeding. Don't use NSAID medicines if you are already taking one for another condition (like arthritis) or are on daily aspirin therapy (such as for heart disease or after a stroke). Talk with your healthcare provider first.    If antibiotics were prescribed, be sure you take them until they are finished. Don t stop taking them even when you feel better. Antibiotics must be taken as a full course.  To prevent the spread of illness:    Remember that washing with soap and water and using alcohol-based  is the best way to prevent the spread of infection.    Clean the toilet after each use.    Wash your hands before eating.    Wash your hands before and after preparing food. Keep in mind that people with diarrhea or vomiting should not prepare food for others.    Wash your hands after using cutting boards, countertops, and knives that have been in contact with raw foods.    Wash and then peel fruits and vegetables.    Keep uncooked meats away from cooked and ready-to-eat foods.    Use a food thermometer when cooking. Cook poultry to at least 165 F (74 C). Cook ground meat (beef, veal, pork, lamb) to at least 160 F (71 C). Cook fresh beef, veal, lamb, and pork to at least 145 F (63 C).    Don t eat raw or undercooked eggs (poached or agustín side up), poultry, meat, or unpasteurized milk and juices.  Food and drinks  The main goal while treating vomiting or diarrhea is to prevent dehydration. This is done by taking small amounts of liquids often.    Keep in mind that liquids are more important than food right now.    Drink only small amounts of liquids at a time.    Don t force yourself to eat, especially if you  are having cramping, vomiting, or diarrhea. Don t eat large amounts at a time, even if you are hungry.    If you eat, avoid fatty, greasy, spicy, or fried foods.    Don t eat dairy foods or drink milk if you have diarrhea. These can make diarrhea worse.  During the first 24 hours you can try:    Oral rehydration solutions. Do not use sports drinks. They have too much sugar and not enough electrolytes.    Soft drinks without caffeine    Ginger ale    Water (plain or flavored)    Decaf tea or coffee    Clear broth, consommé, or bouillon    Gelatin, popsicles, or frozen fruit juice bars  The second 24 hours, if you are feeling better, you can add:    Hot cereal, plain toast, bread, rolls, or crackers    Plain noodles, rice, mashed potatoes, chicken noodle soup, or rice soup    Unsweetened canned fruit (no pineapple)    Bananas  As you recover:    Limit fat intake to less than 15 grams per day. Don t eat margarine, butter, oils, mayonnaise, sauces, gravies, fried foods, peanut butter, meat, poultry, or fish.    Limit fiber. Don t eat raw or cooked vegetables, fresh fruits except bananas, or bran cereals.    Limit caffeine and chocolate.    Limit dairy.    Don t use spices or seasonings except salt.    Go back to your normal diet over time, as you feel better and your symptoms improve.    If the symptoms come back, go back to a simple diet or clear liquids.  Follow-up care  Follow up with your healthcare provider, or as advised. If a stool sample was taken or cultures were done, call the healthcare provider for the results as instructed.  Call 911  Call 911 if you have any of these symptoms:    Trouble breathing    Confusion    Extreme drowsiness or trouble walking    Loss of consciousness    Rapid heart rate    Chest pain    Stiff neck    Seizure  When to seek medical advice  Call your healthcare provider right away if any of these occur:    Abdominal pain that gets worse    Constant lower right abdominal  pain    Continued vomiting and inability to keep liquids down    Diarrhea more than 5 times a day    Blood in vomit or stool    Dark urine or no urine for 8 hours, dry mouth and tongue, tiredness, weakness, or dizziness    Drowsiness    New rash    You don t get better in 2 to 3 days    Fever of 100.4 F (38 C) or higher that doesn t get lower with medicine  Date Last Reviewed: 1/3/2016    3130-9622 [employer]. [address] [place] [address]. All rights reserved. This information is not intended as a substitute for professional medical care. Always follow your healthcare professional's instructions.        Understanding Rectal Bleeding    Rectal bleeding is when blood passes through your rectum and anus. It can happen with or without a bowel movement. Rectal bleeding may be a sign of a serious problem in your rectum, colon, or upper GI tract. Call your healthcare provider right away if you have any rectal bleeding.  The GI Tract  The gastrointestinal (GI) tract includes the mouth, esophagus, stomach, small intestine, large intestine (colon), rectum, and anus. The food you eat is digested as it passes through the GI tract. Solid waste leaves the body through the rectum.   Rectal bleeding and GI problems  The cause of rectal bleeding may be found in any region of the GI tract. The colon or rectum may be the site of your bleeding problem. Or, bleeding may be due to problems farther up the GI tract, such as in the small intestine, duodenum, or stomach.  Causes of rectal bleeding  Rectal bleeding causes include the following:    Hemorrhoids (swollen veins in the rectum and anus)    Fissures (tears in or near the anus)    Diverticulosis (inflamed pockets in the colon wall)    Infection    Ischemia (low blood flow)    Radiation damage    Inflammatory bowel disease (Crohn's disease or ulcerative colitis)    Ulcers in the upper GI tract and inflammation of the large intestine    Abnormal tissue  "growths (tumors or polyps) in the GI tract    A bulging rectum (also called a rectal prolapse)    Abnormal blood vessels in the small intestine or in the colon  Common symptoms  Common symptoms include the following:    Rectal pain, itching, or soreness    Belly pain or epigastric pain    Minor occasional drops of blood that appear on the stool or toilet paper, to greater amounts of stool that appear black or tarry   Rectal bleeding can also happen without pain.  Date Last Reviewed: 7/1/2016 2000-2017 [employer]. [address] [place] [address]. All rights reserved. This information is not intended as a substitute for professional medical care. Always follow your healthcare professional's instructions.                Follow-ups after your visit        Who to contact     If you have questions or need follow up information about today's clinic visit or your schedule please contact [facility] [place] directly at [phone].  Normal or non-critical lab and imaging results will be communicated to you by [employer]hart, letter or phone within 4 business days after the clinic has received the results. If you do not hear from us within 7 days, please contact the clinic through [employer]hart or phone. If you have a critical or abnormal lab result, we will notify you by phone as soon as possible.  Submit refill requests through [employer] or call your pharmacy and they will forward the refill request to us. Please allow 3 business days for your refill to be completed.          Additional Information About Your Visit        [employer] Information     [employer] lets you send messages to your doctor, view your test results, renew your prescriptions, schedule appointments and more. To sign up, go to [email or web address] . Click on \"Log in\" on the left side of the screen, which will take you to the Welcome page. Then click on \"Sign up Now\" on the right side of the page.     You will be asked to enter the " "access code listed below, as well as some personal information. Please follow the directions to create your username and password.     Your access code is: [email or web address]  Expires: 2017  4:44 PM     Your access code will  in 90 days. If you need help or a new code, please call your [facility]] or [phone].        Care EveryWhere ID     This is your Care EveryWhere ID. This could be used by other organizations to access your [place] [facility] medical records  [id number]        Your Vitals Were     Pulse Temperature Height Pulse Oximetry BMI (Body Mass Index)       77 98  F (36.7  C) (Tympanic) 5' 5\" (1.651 m) 96% 35.78 kg/m2        Blood Pressure from Last 3 Encounters:   17 113/76   17 108/73   17 120/74    Weight from Last 3 Encounters:   17 215 lb (97.5 kg)   17 215 lb (97.5 kg)   17 216 lb (98 kg)              We Performed the Following     CBC with platelets          Today's Medication Changes          These changes are accurate as of: 17  5:06 PM.  If you have any questions, ask your nurse or doctor.               Start taking these medicines.        Dose/Directions    hydrocortisone 2.5 % cream   Commonly known as:  ANUSOL-HC   Used for:  Anal irritation   Started by:  Jackelyn Sims MD        Place rectally 2 times daily for 14 days   Quantity:  30 g   Refills:  0            Where to get your medicines      These medications were sent to [id number] - [place], [place] [id number] [place] [id number] [facility] [place] [address]     Phone:  [phone]     hydrocortisone 2.5 % cream                Primary Care Provider Office Phone # Fax #    Jackelyn Sims [phone]        [address]  [place] [address]        Equal Access to Services     [facility] : Raza Tavarez, waaxda luqadaha, qaybta dwightalzeina mak. [facility] [phone].    ATENCIÓN: Si habla " español, tiene a vu disposición servicios gratuitos de asistencia lingüística. Tennille foster [phone].    We comply with applicable federal civil rights laws and [place] laws. We do not discriminate on the basis of race, color, national origin, age, disability sex, sexual orientation or gender identity.            Thank you!     Thank you for choosing [facility] [place]  for your care. Our goal is always to provide you with excellent care. Hearing back from our patients is one way we can continue to improve our services. Please take a few minutes to complete the written survey that you may receive in the mail after your visit with us. Thank you!             Your Updated Medication List - Protect others around you: Learn how to safely use, store and throw away your medicines at www.disposemymeds.org.          This list is accurate as of: 8/22/17  5:06 PM.  Always use your most recent med list.                   Brand Name Dispense Instructions for use Diagnosis    amitriptyline 10 MG tablet    ELAVIL    90 tablet    Take 2 tablets by mouth At Bedtime.        aspirin 81 MG tablet      Take 81 mg by mouth daily        atorvastatin 20 MG tablet    LIPITOR    90 tablet    Take 1 tablet (20 mg) by mouth daily    Hyperlipidemia LDL goal <70       calcium 500 MG Chew      1 per day        citalopram 40 MG tablet    celeXA    90 tablet    Take 1 tablet (40 mg) by mouth At Bedtime    Adjustment disorder with mixed anxiety and depressed mood       clonazePAM 2 MG tablet    klonoPIN    60 tablet    Take 2 mg by mouth At Bedtime        desonide 0.05 % cream    DESOWEN    15 g    Apply sparingly to affected area three times daily for 14 days.    Eyelid dermatitis, eczematous       fluticasone 50 MCG/ACT spray    FLONASE    1 Bottle    Spray 1-2 sprays into both nostrils daily    Seasonal allergic rhinitis due to other allergic trigger       hydrocortisone 2.5 % cream    ANUSOL-HC    30 g    Place rectally 2 times  daily for 14 days    Anal irritation       metoprolol 50 MG 24 hr tablet    TOPROL-XL    90 tablet    Take 1 tablet (50 mg) by mouth daily    Coronary artery disease involving other coronary artery bypass graft with other forms of angina pectoris (H)       MULTIVITAMIN TABS   OR      one tablet daily        nitroGLYcerin 0.4 MG sublingual tablet    NITROSTAT    25 tablet    Place 1 tablet (0.4 mg) under the tongue every 5 minutes as needed for chest pain    Non-ST elevation myocardial infarction, subsequent care episode (H)       OMEGA-3 FISH OIL PO           polyethylene glycol powder    MIRALAX    510 g    Take 17 g (1 capful) by mouth daily    Other constipation

## 2017-08-25 ENCOUNTER — TRANSFERRED RECORDS (OUTPATIENT)
Dept: HEALTH INFORMATION MANAGEMENT | Facility: CLINIC | Age: 57
End: 2017-08-25

## 2017-11-03 DIAGNOSIS — F43.23 ADJUSTMENT DISORDER WITH MIXED ANXIETY AND DEPRESSED MOOD: ICD-10-CM

## 2017-11-03 RX ORDER — CITALOPRAM HYDROBROMIDE 40 MG/1
40 TABLET ORAL AT BEDTIME
Qty: 90 TABLET | Refills: 0 | Status: SHIPPED | OUTPATIENT
Start: 2017-11-03 | End: 2018-01-10

## 2017-11-03 ASSESSMENT — PATIENT HEALTH QUESTIONNAIRE - PHQ9: SUM OF ALL RESPONSES TO PHQ QUESTIONS 1-9: 1

## 2017-11-03 NOTE — TELEPHONE ENCOUNTER
Citalopram 40 mg     Last Written Prescription Date: 4/6/17  Last Fill Quantity: 90, # refills: 1  Last Office Visit with [facility] primary care provider:  8/22/17        Last PHQ-9 score on record=   PHQ-9 SCORE 4/6/2017   Total Score -   Total Score 3     Patient due for PHQ 9.  Left non-detailed message to call back and ask to speak with a triage nurse.   Aidee Clarke RN    PHQ-9 SCORE 8/4/2016 4/6/2017 11/3/2017   Total Score - - -   Total Score 5 3 1     Prescription approved per [facility] Refill Protocol.  Aidee Clarke RN

## 2018-01-10 DIAGNOSIS — F43.23 ADJUSTMENT DISORDER WITH MIXED ANXIETY AND DEPRESSED MOOD: ICD-10-CM

## 2018-01-10 RX ORDER — CITALOPRAM HYDROBROMIDE 40 MG/1
TABLET ORAL
Qty: 90 TABLET | Refills: 0 | Status: SHIPPED | OUTPATIENT
Start: 2018-01-10 | End: 2018-05-02

## 2018-01-10 NOTE — TELEPHONE ENCOUNTER
PHQ-9 SCORE 8/4/2016 4/6/2017 11/3/2017   Total Score - - -   Total Score 5 3 1     Prescription approved per [facility], [facility] or [facility] refill protocol.  Ruth Jernigan RN - Triage  [facility]

## 2018-01-10 NOTE — TELEPHONE ENCOUNTER
"Requested Prescriptions   Pending Prescriptions Disp Refills     citalopram (CELEXA) 40 MG tablet [Pharmacy Med Name: CITALOPRAM HYDROBROMIDE 40MG TABS]  Last Written Prescription Date:  11/3/17  Last Fill Quantity: 90,  # refills: 0   Last Office Visit with [facility], [facility] or [facility] prescribing provider:  8/22/17   Future Office Visit:      30 tablet 0     Sig: TAKE ONE TABLET BY MOUTH AT BEDTIME    [facility] Protocol Passed    1/10/2018  3:04 PM       Passed - Recent or future visit with authorizing provider    Patient had office visit in the last year or has a visit in the next 30 days with authorizing provider.  See \"Patient Info\" tab in inbasket, or \"Choose Columns\" in Meds & Orders section of the refill encounter.              Passed - Patient is age 18 or older       Passed - No active pregnancy on record       Passed - No positive pregnancy test in last 12 months          "

## 2018-01-29 ENCOUNTER — OFFICE VISIT (OUTPATIENT)
Dept: FAMILY MEDICINE | Facility: CLINIC | Age: 58
End: 2018-01-29
Payer: OTHER MISCELLANEOUS

## 2018-01-29 VITALS
OXYGEN SATURATION: 95 % | WEIGHT: 215 LBS | SYSTOLIC BLOOD PRESSURE: 118 MMHG | DIASTOLIC BLOOD PRESSURE: 70 MMHG | TEMPERATURE: 98.3 F | HEART RATE: 89 BPM | BODY MASS INDEX: 35.82 KG/M2 | HEIGHT: 65 IN

## 2018-01-29 DIAGNOSIS — Z01.818 PREOP GENERAL PHYSICAL EXAM: Primary | ICD-10-CM

## 2018-01-29 DIAGNOSIS — R94.5 NONSPECIFIC ABNORMAL RESULTS OF LIVER FUNCTION STUDY: ICD-10-CM

## 2018-01-29 DIAGNOSIS — I21.4: ICD-10-CM

## 2018-01-29 DIAGNOSIS — I42.8 OTHER CARDIOMYOPATHY (H): ICD-10-CM

## 2018-01-29 LAB
BASOPHILS # BLD AUTO: 0 10E9/L (ref 0–0.2)
BASOPHILS NFR BLD AUTO: 0.3 %
DIFFERENTIAL METHOD BLD: NORMAL
EOSINOPHIL # BLD AUTO: 0.2 10E9/L (ref 0–0.7)
EOSINOPHIL NFR BLD AUTO: 3.7 %
ERYTHROCYTE [DISTWIDTH] IN BLOOD BY AUTOMATED COUNT: 13.3 % (ref 10–15)
HCT VFR BLD AUTO: 42.5 % (ref 35–47)
HGB BLD-MCNC: 14 G/DL (ref 11.7–15.7)
LYMPHOCYTES # BLD AUTO: 2.5 10E9/L (ref 0.8–5.3)
LYMPHOCYTES NFR BLD AUTO: 38.6 %
MCH RBC QN AUTO: 28.7 PG (ref 26.5–33)
MCHC RBC AUTO-ENTMCNC: 32.9 G/DL (ref 31.5–36.5)
MCV RBC AUTO: 87 FL (ref 78–100)
MONOCYTES # BLD AUTO: 0.6 10E9/L (ref 0–1.3)
MONOCYTES NFR BLD AUTO: 8.7 %
NEUTROPHILS # BLD AUTO: 3.2 10E9/L (ref 1.6–8.3)
NEUTROPHILS NFR BLD AUTO: 48.7 %
PLATELET # BLD AUTO: 235 10E9/L (ref 150–450)
RBC # BLD AUTO: 4.87 10E12/L (ref 3.8–5.2)
WBC # BLD AUTO: 6.5 10E9/L (ref 4–11)

## 2018-01-29 PROCEDURE — 36415 COLL VENOUS BLD VENIPUNCTURE: CPT | Performed by: FAMILY MEDICINE

## 2018-01-29 PROCEDURE — 80053 COMPREHEN METABOLIC PANEL: CPT | Performed by: FAMILY MEDICINE

## 2018-01-29 PROCEDURE — 85025 COMPLETE CBC W/AUTO DIFF WBC: CPT | Performed by: FAMILY MEDICINE

## 2018-01-29 PROCEDURE — 99214 OFFICE O/P EST MOD 30 MIN: CPT | Performed by: FAMILY MEDICINE

## 2018-01-29 NOTE — NURSING NOTE
"Chief Complaint   Patient presents with     Pre-Op Exam       Initial /70  Pulse 89  Temp 98.3  F (36.8  C) (Tympanic)  Ht 5' 5\" (1.651 m)  Wt 215 lb (97.5 kg)  SpO2 95%  BMI 35.78 kg/m2 Estimated body mass index is 35.78 kg/(m^2) as calculated from the following:    Height as of this encounter: 5' 5\" (1.651 m).    Weight as of this encounter: 215 lb (97.5 kg).  Medication Reconciliation: complete  "

## 2018-01-29 NOTE — MR AVS SNAPSHOT
After Visit Summary   1/29/2018    Carmen Nuñez    MRN: [id number]           Patient Information     Date Of Birth          1960        Visit Information        Provider Department      1/29/2018 11:40 AM Jackelyn Sims MD [facility] [place]        Today's Diagnoses     Preop general physical exam    -  1    Other cardiomyopathy (H)        Nonspecific abnormal results of liver function study        Non-ST elevation myocardial infarction, subsequent care episode          Care Instructions      Before Your Surgery      Call your surgeon if there is any change in your health. This includes signs of a cold or flu (such as a sore throat, runny nose, cough, rash or fever).    Do not smoke, drink alcohol or take over the counter medicine (unless your surgeon or primary care doctor tells you to) for the 24 hours before and after surgery.    If you take prescribed drugs: Follow your doctor s orders about which medicines to take and which to stop until after surgery.    Eating and drinking prior to surgery: follow the instructions from your surgeon    Take a shower or bath the night before surgery. Use the soap your surgeon gave you to gently clean your skin. If you do not have soap from your surgeon, use your regular soap. Do not shave or scrub the surgery site.  Wear clean pajamas and have clean sheets on your bed.           Follow-ups after your visit        Who to contact     If you have questions or need follow up information about today's clinic visit or your schedule please contact [facility] [place] directly at [phone].  Normal or non-critical lab and imaging results will be communicated to you by MyChart, letter or phone within 4 business days after the clinic has received the results. If you do not hear from us within 7 days, please contact the clinic through MyChart or phone. If you have a critical or abnormal lab result, we will notify you by phone as  "soon as possible.  Submit refill requests through [employer] or call your pharmacy and they will forward the refill request to us. Please allow 3 business days for your refill to be completed.          Additional Information About Your Visit        [employer] Information     [employer] lets you send messages to your doctor, view your test results, renew your prescriptions, schedule appointments and more. To sign up, go to [email or web address] . Click on \"Log in\" on the left side of the screen, which will take you to the Welcome page. Then click on \"Sign up Now\" on the right side of the page.     You will be asked to enter the access code listed below, as well as some personal information. Please follow the directions to create your username and password.     Your access code is: [email or web address]  Expires: 2018 12:42 PM     Your access code will  in 90 days. If you need help or a new code, please call your [facility] clinic or [phone].        Care EveryWhere ID     This is your Care EveryWhere ID. This could be used by other organizations to access your [facility] medical records  [id number]        Your Vitals Were     Pulse Temperature Height Pulse Oximetry BMI (Body Mass Index)       89 98.3  F (36.8  C) (Tympanic) 5' 5\" (1.651 m) 95% 35.78 kg/m2        Blood Pressure from Last 3 Encounters:   18 118/70   17 113/76   17 108/73    Weight from Last 3 Encounters:   18 215 lb (97.5 kg)   17 215 lb (97.5 kg)   17 215 lb (97.5 kg)              We Performed the Following     CBC with platelets and differential     Comprehensive metabolic panel        Primary Care Provider Office Phone # Fax #    Jackelyn Sims [phone]       [address]  [place] [address]        Equal Access to Services     [facility] : Raza Tavarez, wadaryl pulido, zeina lanier. [facility] " [phone].    ATENCIÓN: Si roya suh, tiene a vu disposición servicios gratuitos de asistencia lingüística. Tennille foster [phone].    We comply with applicable federal civil rights laws and [place] laws. We do not discriminate on the basis of race, color, national origin, age, disability, sex, sexual orientation, or gender identity.            Thank you!     Thank you for choosing [facility] [place]  for your care. Our goal is always to provide you with excellent care. Hearing back from our patients is one way we can continue to improve our services. Please take a few minutes to complete the written survey that you may receive in the mail after your visit with us. Thank you!             Your Updated Medication List - Protect others around you: Learn how to safely use, store and throw away your medicines at www.disposemymeds.org.          This list is accurate as of 1/29/18 12:42 PM.  Always use your most recent med list.                   Brand Name Dispense Instructions for use Diagnosis    amitriptyline 10 MG tablet    ELAVIL    90 tablet    Take 2 tablets by mouth At Bedtime.        aspirin 81 MG tablet      Take 81 mg by mouth daily        atorvastatin 20 MG tablet    LIPITOR    90 tablet    Take 1 tablet (20 mg) by mouth daily    Hyperlipidemia LDL goal <70       calcium 500 MG Chew      1 per day        citalopram 40 MG tablet    celeXA    90 tablet    TAKE ONE TABLET BY MOUTH AT BEDTIME    Adjustment disorder with mixed anxiety and depressed mood       desonide 0.05 % cream    DESOWEN    15 g    Apply sparingly to affected area three times daily for 14 days.    Eyelid dermatitis, eczematous       fluticasone 50 MCG/ACT spray    FLONASE    1 Bottle    Spray 1-2 sprays into both nostrils daily    Seasonal allergic rhinitis due to other allergic trigger       metoprolol succinate 50 MG 24 hr tablet    TOPROL-XL    90 tablet    Take 1 tablet (50 mg) by mouth daily    Coronary artery disease  involving other coronary artery bypass graft with other forms of angina pectoris (H)       MULTIVITAMIN TABS   OR      one tablet daily        nitroGLYcerin 0.4 MG sublingual tablet    NITROSTAT    25 tablet    Place 1 tablet (0.4 mg) under the tongue every 5 minutes as needed for chest pain    Non-ST elevation myocardial infarction, subsequent care episode (H)       OMEGA-3 FISH OIL PO           polyethylene glycol powder    MIRALAX    510 g    Take 17 g (1 capful) by mouth daily    Other constipation

## 2018-01-29 NOTE — PROGRESS NOTES
[facility] [place] [facility]  [address] [address]  [phone]  Dept: [phone]    PRE-OP EVALUATION:  Today's date: 2018    Carmen Nuñez (: 1960) presents for pre-operative evaluation assessment as requested by Dr. Brown.  She requires evaluation and anesthesia risk assessment prior to undergoing surgery/procedure for treatment of right big toe .  Proposed procedure: phalangeal fracture surgery     Date of Surgery/ Procedure: 2018  Time of Surgery/ Procedure: unknown   Hospital/Surgical Facility: [facility]   Fax number for surgical facility: [phone]  Primary Physician: Jackelyn Sims  Type of Anesthesia Anticipated: TBD    Patient has a Health Care Directive or Living Will:  NO    1. YES - Do you have a history of heart attack, stroke, stent, bypass or surgery on an artery in the head, neck, heart or legs?Hx of  CAD, status post bypass , seeing cardiology   2. NO - Do you ever have any pain or discomfort in your chest?  3. YES - Do you have a history of  Heart Failure? Hx of cardiomyopathy, and CAD, seeing cardiology   4. NO - Are you troubled by shortness of breath when: walking on the level, up a slight hill or at night?  5. NO - Do you currently have a cold, bronchitis or other respiratory infection?  6. NO - Do you have a cough, shortness of breath or wheezing?  7. NO - Do you sometimes get pains in the calves of your legs when you walk?  8. NO - Do you or anyone in your family have previous history of blood clots?  9. NO - Do you or does anyone in your family have a serious bleeding problem such as prolonged bleeding following surgeries or cuts?  10. YES - Have you ever had problems with anemia or been told to take iron pills? It was related to heavy menstrual periods, years ago Not  taking iron supplement any more    11. YES - Have you had any abnormal blood loss such as black, tarry or bloody stools, or abnormal  vaginal bleeding?  12. NO - Have you ever had a blood transfusion?  13. NO - Have you or any of your relatives ever had problems with anesthesia?  14. YES - Do you have sleep apnea, excessive snoring or daytime drowsiness? Snore, but No sleep apnea , had studies   15. NO - Do you have any prosthetic heart valves?  16. NO - Do you have prosthetic joints?  17. NO - Is there any chance that you may be pregnant?      HPI:                                                       Brief HPI related to upcoming procedure:       See problem list for active medical problems.  Problems all longstanding and stable, except as noted/documented.  See ROS for pertinent symptoms related to these conditions.                                                                                                                                                   .  CAD - Patient has a hx history of  CAD/ cardiomyopathy, s/p bypass 2010. Patient denies recent chest pain or NTG use, denies exercise induced dyspnea or PND. Last Stress test 10/2014,was supposed to do 3- 5 yr follow up, although last saw cardiology 04/2017, and clinically she was doing good. Has order for stress test this year but she has not scheduled yet.                                                                                                                            .    MEDICAL HISTORY:                                                      Patient Active Problem List    Diagnosis Date Noted     Other constipation 08/09/2017     Priority: Medium     Tricuspid valve disorders, non-rheumatic      Priority: Medium     per 2011 echo, mild-mod (1-2+) TR       Acute left ankle pain 10/14/2016     Priority: Medium     Acute pain of left knee 10/14/2016     Priority: Medium     BPPV (benign paroxysmal positional vertigo), unspecified laterality 03/09/2016     Priority: Medium     Brain aneurysm 03/09/2016     Priority: Medium     hx of ruptured anurysm, s/p surgery at age 31        Adjustment disorder with mixed anxiety and depressed mood 10/27/2015     Priority: Medium     S/P rotator cuff repair 01/09/2015     Priority: Medium     Nonspecific abnormal results of liver function study 01/08/2015     Priority: Medium     Coronary artery disease      Priority: Medium     CABG 1/24/2011: LIMA to LAD, SVG to OM, SVG to RCA, cardiac cath 2010: RAMOS x2 to LAD       Cardiomyopathy (H)      Priority: Medium     EF 45-50% per 2011 echo       Advanced directives, counseling/discussion 09/12/2014     Priority: Medium     Advance Care Planning:   ACP Review and Resources Provided:  Reviewed chart for advance care plan.  Carmen Nuñez has no plan or code status on file. Mailed available resources and provided with information. Confirmed code status reflects current choices pending further ACP discussions.  Confirmed/documented designated decision maker(s). See permanent comments section of demographics in clinical tab.   Added by Wandy Gonzalez on 9/12/2014             History of colonic polyps 09/08/2014     Priority: Medium     s/p colon poly 2010       Impingement syndrome of right shoulder 06/26/2014     Priority: Medium     Hyperlipidemia LDL goal <70 11/12/2010     Priority: Medium     Non-ST elevation myocardial infarction, subsequent care episode 11/12/2010     Priority: Medium     hx of mi 201, seeing cardiologist        Carpal tunnel syndrome      Priority: Medium     rt s/p ortho eval and EMG       Obesity      Priority: Medium     Problem list name updated by automated process. Provider to review       Acute gastritis 11/30/2005     Priority: Medium     nsaid related  Problem list name updated by automated process. 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      Allergic rhinitis due to other allergen     molds, pets, grasses, pollen - on immunotherapy     Calculus of gallbladder without mention of cholecystitis or obstruction     cholecystectomy     Cardiomyopathy (H)      Carpal tunnel syndrome     rt s/p ortho eval and EMG     Coronary artery disease     CABG 2011: LIMA to LAD, SVG to OM, SVG to RCA, cardiac cath : RAMOS x2 to LAD     Depressive disorder, not elsewhere classified      Excessive or frequent menstruation     vaginal hysterectomy, ovaries remain     Family history of malignant neoplasm of breast     father     Headache(784.0)     frontal     Hyperlipidaemia      Hypertension      Intramural leiomyoma of uterus      Iron deficiency anemia, unspecified 10/05    s/p EGD, colonoscopy, SBFT pos erosive gastritis nsaid related     Mixed hyperlipidemia      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 Ramsay     Supervision of other normal pregnancy      - vaginal     Tricuspid valve disorders, non-rheumatic     per  echo, mild-mod (1-2+) TR     Past Surgical History:   Procedure Laterality Date     C APPENDECTOMY  1985    incidental     C NONSPECIFIC  PROCEDURE  1985    colposcopy/cryotherapy of cervix for abnl pap smear     C NONSPECIFIC PROCEDURE  1992    clipping ruptured cerebral aneurysm     C NONSPECIFIC PROCEDURE  10/05    UGI with SBFT nl     C VAGINAL HYSTERECTOMY  04/03    partial ovaries remain     CHOLECYSTECTOMY, OPEN  1985     CORONARY ANGIOGRAPHY ADULT ORDER  1-20-11      New critical left main stenosis of 80-90% , CABG recommended     CORONARY ARTERY BYPASS  1-24-11    X3 1/11 LIMA to LAD, SVG to OM, SVG aorta to distal RCA     CRANIOTOMY      resection on brai aneurysm at age 31      CRANIOTOMY, REPAIR ANEURYSM, COMBINED      at age 31      HC COLONOSCOPY THRU STOMA, DIAGNOSTIC  10/05    bx of terminal ileal nodules benign     HC UGI ENDOSCOPY DIAG W OR W/O BRUSH/WASH  10/05    erosive gastritis, neg sprue bx     HEART CATH, ANGIOPLASTY  Nov 2010    intracoronary stenting in the mid LAD.       HYSTERECTOMY, PAP NO LONGER INDICATED       HYSTERECTOMY, PAP NO LONGER INDICATED       TONSILLECTOMY      tonsillectomy     Current Outpatient Prescriptions   Medication Sig Dispense Refill     citalopram (CELEXA) 40 MG tablet TAKE ONE TABLET BY MOUTH AT BEDTIME 90 tablet 0     polyethylene glycol (MIRALAX) powder Take 17 g (1 capful) by mouth daily 510 g 1     fluticasone (FLONASE) 50 MCG/ACT spray Spray 1-2 sprays into both nostrils daily 1 Bottle 11     metoprolol (TOPROL-XL) 50 MG 24 hr tablet Take 1 tablet (50 mg) by mouth daily 90 tablet 3     atorvastatin (LIPITOR) 20 MG tablet Take 1 tablet (20 mg) by mouth daily 90 tablet 3     Omega-3 Fatty Acids (OMEGA-3 FISH OIL PO)        nitroglycerin (NITROSTAT) 0.4 MG SL tablet Place 1 tablet (0.4 mg) under the tongue every 5 minutes as needed for chest pain 25 tablet 1     desonide (DESOWEN) 0.05 % cream Apply sparingly to affected area three times daily for 14 days. 15 g 0     aspirin 81 MG tablet Take 81 mg by mouth daily       ClonAZEPAM (KLONOPIN) 2 MG tablet Take 2 mg by mouth At Bedtime  60 tablet 0      amitriptyline (ELAVIL) 10 MG tablet Take 2 tablets by mouth At Bedtime. 90 tablet 2     CALCIUM 500 MG PO CHEW 1 per day       MULTIVITAMIN TABS   OR one tablet daily       OTC products: None, except as noted above    Allergies   Allergen Reactions     Augmentin GI Disturbance     can take penicillin     Dye [Contrast Dye] Hives     hydroxizine Hcl does help prevent hives.      Latex Allergy: NO    Social History   Substance Use Topics     Smoking status: Former Smoker     Packs/day: 2.00     Years: 22.00     Types: Cigarettes     Quit date: 1/1/1994     Smokeless tobacco: Never Used     Alcohol use No      Comment: quit 1994     History   Drug Use No     Comment: quit amphetamines 1994       REVIEW OF SYSTEMS:                                                    C: NEGATIVE for fever, chills, change in weight  I: NEGATIVE for worrisome rashes, moles or lesions  E: NEGATIVE for vision changes or irritation  E/M: NEGATIVE for ear, mouth and throat problems  R: NEGATIVE for significant cough or SOB  CV: NEGATIVE for chest pain, palpitations or peripheral edema  CV: has know hx of cad, no new sx or changes, but has a follow up stress due , so may consider going through that before surgery   GI: NEGATIVE for nausea, abdominal pain, heartburn, or change in bowel habits  : NEGATIVE for frequency, dysuria, or hematuria  M: NEGATIVE for significant arthralgias or myalgia  N: NEGATIVE for weakness, dizziness or paresthesias  E: NEGATIVE for temperature intolerance, skin/hair changes  H: NEGATIVE for bleeding problems  P: NEGATIVE for changes in mood or affect    EXAM:                                                    There were no vitals taken for this visit.    GENERAL APPEARANCE: healthy, alert and no distress     EYES: EOMI, PERRL     HENT: ear canals and TM's normal and nose and mouth without ulcers or lesions     NECK: no adenopathy, no asymmetry, masses, or scars and thyroid normal to palpation     RESP: lungs  clear to auscultation - no rales, rhonchi or wheezes     CV: regular rates and rhythm, normal S1 S2, no S3 or S4      ABDOMEN:  soft, nontender, no HSM or masses and bowel sounds normal     MS: no  ankle edema     NEURO: Normal strength and tone, sensory exam grossly normal, mentation intact and speech normal     PSYCH: mentation appears normal. and affect normal/bright    DIAGNOSTICS:                                                      No  EKG required because  She had one less then a year ago.   Also  is due for stress test, and she will schedule   Labs Drawn and in Process:   Unresulted Labs Ordered in the Past 30 Days of this Admission     No orders found from 11/30/2017 to 1/30/2018.          Recent Labs   Lab Test  08/22/17   1646  08/09/17   1043  04/06/17   1008  04/06/17   1007   01/26/11   0730   01/24/11   1320   01/21/11   1100   HGB  13.6   --   13.5   --    < >   --    < >  11.7  11.6*   < >  11.7   PLT  214   --   255   --    < >   --    < >  231   --   224   INR   --    --    --    --    --    --    --   1.20*   --   0.96   NA   --   141   --   142   < >  137   < >  141   < >  138   POTASSIUM   --   3.9   --   3.8   < >  4.8   < >  3.6   < >  3.8   CR   --   0.55   --   0.61   < >  0.61   < >   --    --   0.67   A1C   --    --    --    --    --   5.9   --    --    --   6.0    < > = values in this interval not displayed.        IMPRESSION:                                                    (Z01.818) Preop general physical exam  (primary encounter diagnosis)  Comment:   Plan: CBC with platelets and differential,         Comprehensive metabolic panel            (I42.8) Other cardiomyopathy (H)  Comment:   Plan:     (R94.5) Nonspecific abnormal results of liver function study  Comment:   Plan: Comprehensive metabolic panel    (I21.4) Non-ST elevation myocardial infarction, subsequent care episode  Comment: hx of mi 201, seeing cardiologist , due for 3 yr f/u stress echo, so will schedule that before  surgery   Plan: she will check with cardiology for clearance         The proposed surgical procedure is considered HIGH risk.    REVISED CARDIAC RISK INDEX  The patient has the following serious cardiovascular risks for perioperative complications such as (MI, PE, VFib and 3  AV Block):  High risk surgery (>5% cardiac complication risk)  INTERPRETATION: 2 risks: Class III (moderate risk - 6.6% complication rate)    The patient has the following additional risks for perioperative complications:  No identified additional risks      RECOMMENDATIONS:                                                      --Consult hospital rounder / IM to assist post-op medical management, if needed         APPROVAL GIVEN to proceed with proposed procedure,  after she check with cardiology for clearance          Signed Electronically by: Jackelyn Sims MD    Copy of this evaluation report is provided to requesting physician.    [facility] Preop Guidelines

## 2018-01-29 NOTE — LETTER
February 9, 2018      Carmen Blanca Joaquin  [address]  [place] [address]        Dear ,    Attached are your lab results.    Normal complete blood count including hemoglobin,RBC Count,White blood cell count (wbc)   Normal liver function tests, kidney functions, and electrolytes(various salts in your body).      Liver tests remain mildly abnormal. Ok to watch and return to clinic for a retest in 3-4 weeks   In the meantime you should avoid high fat diet, alcohol and OTC tylenol like medication, to protect your liver .     Your blood sugar is elevated, although it is non fasting with normal fasting blood sugar being under 100 and diabetes being diagnosed when the blood sugar is over 125.     Maintaining a normal weight and limiting simple carbohydrates are tools you can use to prevent diabetes. Simple carbohydrates are foods and drinks that are obviously sweet (i.e. desserts,sodas, and candy) and processed grains (i.e white flours and white rice). Brown rice and breads/ pastas made with whole grains are better diet choices to control blood sugar.     You should do a follow up fasting repeat test in few weeks.                  Resulted Orders   CBC with platelets and differential   Result Value Ref Range    WBC 6.5 4.0 - 11.0 10e9/L    RBC Count 4.87 3.8 - 5.2 10e12/L    Hemoglobin 14.0 11.7 - 15.7 g/dL    Hematocrit 42.5 35.0 - 47.0 %    MCV 87 78 - 100 fl    MCH 28.7 26.5 - 33.0 pg    MCHC 32.9 31.5 - 36.5 g/dL    RDW 13.3 10.0 - 15.0 %    Platelet Count 235 150 - 450 10e9/L    Diff Method Automated Method     % Neutrophils 48.7 %    % Lymphocytes 38.6 %    % Monocytes 8.7 %    % Eosinophils 3.7 %    % Basophils 0.3 %    Absolute Neutrophil 3.2 1.6 - 8.3 10e9/L    Absolute Lymphocytes 2.5 0.8 - 5.3 10e9/L    Absolute Monocytes 0.6 0.0 - 1.3 10e9/L    Absolute Eosinophils 0.2 0.0 - 0.7 10e9/L    Absolute Basophils 0.0 0.0 - 0.2 10e9/L   Comprehensive metabolic panel   Result Value Ref Range    Sodium  139 133 - 144 mmol/L    Potassium 4.0 3.4 - 5.3 mmol/L    Chloride 106 94 - 109 mmol/L    Carbon Dioxide 24 20 - 32 mmol/L    Anion Gap 9 3 - 14 mmol/L    Glucose 246 (H) 70 - 99 mg/dL      Comment:      Non Fasting    Urea Nitrogen 19 7 - 30 mg/dL    Creatinine 0.64 0.52 - 1.04 mg/dL    GFR Estimate >90 >60 mL/min/1.7m2      Comment:      Non  GFR Calc    GFR Estimate If Black >90 >60 mL/min/1.7m2      Comment:       GFR Calc    Calcium 9.2 8.5 - 10.1 mg/dL    Bilirubin Total 0.5 0.2 - 1.3 mg/dL    Albumin 3.9 3.4 - 5.0 g/dL    Protein Total 8.0 6.8 - 8.8 g/dL    Alkaline Phosphatase 126 40 - 150 U/L    ALT 82 (H) 0 - 50 U/L    AST 53 (H) 0 - 45 U/L       If you have any questions or concerns, please call the clinic at the number listed above.       Sincerely,    Jackelyn Sims MD

## 2018-01-29 NOTE — LETTER
January 30, 2018      Carmen Blanca Joaquin  [address] [facility] [address]        Dear ,    We are writing to inform you of your test results.    Normal cbc- complete blood count including Hemoglobin,RBC Count,White blood cell count (wbc)       Your test results fall within the expected range(s) or remain unchanged from previous results.  Please continue with current treatment plan.    Resulted Orders   CBC with platelets and differential   Result Value Ref Range    WBC 6.5 4.0 - 11.0 10e9/L    RBC Count 4.87 3.8 - 5.2 10e12/L    Hemoglobin 14.0 11.7 - 15.7 g/dL    Hematocrit 42.5 35.0 - 47.0 %    MCV 87 78 - 100 fl    MCH 28.7 26.5 - 33.0 pg    MCHC 32.9 31.5 - 36.5 g/dL    RDW 13.3 10.0 - 15.0 %    Platelet Count 235 150 - 450 10e9/L    Diff Method Automated Method     % Neutrophils 48.7 %    % Lymphocytes 38.6 %    % Monocytes 8.7 %    % Eosinophils 3.7 %    % Basophils 0.3 %    Absolute Neutrophil 3.2 1.6 - 8.3 10e9/L    Absolute Lymphocytes 2.5 0.8 - 5.3 10e9/L    Absolute Monocytes 0.6 0.0 - 1.3 10e9/L    Absolute Eosinophils 0.2 0.0 - 0.7 10e9/L    Absolute Basophils 0.0 0.0 - 0.2 10e9/L       If you have any questions or concerns, please call the clinic at the number listed above.       Sincerely,        Jackelyn Sims MD

## 2018-01-30 ENCOUNTER — TELEPHONE (OUTPATIENT)
Dept: FAMILY MEDICINE | Facility: CLINIC | Age: 58
End: 2018-01-30

## 2018-01-30 ENCOUNTER — CARE COORDINATION (OUTPATIENT)
Dept: CARDIOLOGY | Facility: CLINIC | Age: 58
End: 2018-01-30

## 2018-01-30 LAB
ALBUMIN SERPL-MCNC: 3.9 G/DL (ref 3.4–5)
ALP SERPL-CCNC: 126 U/L (ref 40–150)
ALT SERPL W P-5'-P-CCNC: 82 U/L (ref 0–50)
ANION GAP SERPL CALCULATED.3IONS-SCNC: 9 MMOL/L (ref 3–14)
AST SERPL W P-5'-P-CCNC: 53 U/L (ref 0–45)
BILIRUB SERPL-MCNC: 0.5 MG/DL (ref 0.2–1.3)
BUN SERPL-MCNC: 19 MG/DL (ref 7–30)
CALCIUM SERPL-MCNC: 9.2 MG/DL (ref 8.5–10.1)
CHLORIDE SERPL-SCNC: 106 MMOL/L (ref 94–109)
CO2 SERPL-SCNC: 24 MMOL/L (ref 20–32)
CREAT SERPL-MCNC: 0.64 MG/DL (ref 0.52–1.04)
GFR SERPL CREATININE-BSD FRML MDRD: >90 ML/MIN/1.7M2
GLUCOSE SERPL-MCNC: 246 MG/DL (ref 70–99)
POTASSIUM SERPL-SCNC: 4 MMOL/L (ref 3.4–5.3)
PROT SERPL-MCNC: 8 G/DL (ref 6.8–8.8)
SODIUM SERPL-SCNC: 139 MMOL/L (ref 133–144)

## 2018-01-30 NOTE — PROGRESS NOTES
Pt called to report that she is scheduled for toe surgery on 2/1/18 with  at [facility]. Pt broke her toe in December and had PMD OV yesterday, and was told she needs cardiac clearance. Pt is due for routine nuclear stress test in April. Pt scheduled with  tomorrow in [place]. No nuclear stress test openings tomorrow morning. Pt denied having any symptoms such as chest pain or shortness of breath. I updated  and he said he can still see pt for clearance appt, and would like EKG done at OV. Pt said she needs work comp papers to be filled out. I told her that the ortho MD or PMD would need to fill those out if it is in relation to her broken toe. PMD records in [facility]. [place] FRASER

## 2018-01-30 NOTE — TELEPHONE ENCOUNTER
Donya from [facility] called to request patient's Preop, labs, EKG if done  CMP labs still in process, No EKG done  TC faxed preop and what labs were back  [phone]  Johnna Alcantara TC

## 2018-01-31 ENCOUNTER — CARE COORDINATION (OUTPATIENT)
Dept: CARDIOLOGY | Facility: CLINIC | Age: 58
End: 2018-01-31

## 2018-01-31 ENCOUNTER — OFFICE VISIT (OUTPATIENT)
Dept: CARDIOLOGY | Facility: CLINIC | Age: 58
End: 2018-01-31
Payer: OTHER MISCELLANEOUS

## 2018-01-31 VITALS
DIASTOLIC BLOOD PRESSURE: 66 MMHG | WEIGHT: 216 LBS | BODY MASS INDEX: 35.99 KG/M2 | HEIGHT: 65 IN | OXYGEN SATURATION: 98 % | HEART RATE: 81 BPM | SYSTOLIC BLOOD PRESSURE: 110 MMHG

## 2018-01-31 DIAGNOSIS — E78.5 HYPERLIPIDEMIA LDL GOAL <70: ICD-10-CM

## 2018-01-31 DIAGNOSIS — Z01.810 ENCOUNTER FOR PRE-OPERATIVE CARDIOVASCULAR CLEARANCE: ICD-10-CM

## 2018-01-31 DIAGNOSIS — I36.9 TRICUSPID VALVE DISORDERS, NON-RHEUMATIC: ICD-10-CM

## 2018-01-31 DIAGNOSIS — Z01.810 PRE-OPERATIVE CARDIOVASCULAR EXAMINATION: ICD-10-CM

## 2018-01-31 DIAGNOSIS — I42.8 OTHER CARDIOMYOPATHY (H): ICD-10-CM

## 2018-01-31 DIAGNOSIS — I25.10 CORONARY ARTERY DISEASE INVOLVING NATIVE CORONARY ARTERY OF NATIVE HEART WITHOUT ANGINA PECTORIS: Primary | ICD-10-CM

## 2018-01-31 PROCEDURE — 99215 OFFICE O/P EST HI 40 MIN: CPT | Performed by: INTERNAL MEDICINE

## 2018-01-31 PROCEDURE — 93010 ELECTROCARDIOGRAM REPORT: CPT | Performed by: INTERNAL MEDICINE

## 2018-01-31 NOTE — PROGRESS NOTES
1/31/18 cardiology OV note from  and EKG faxed to [facility], , clearing pt for surgery. (fax # [phone])

## 2018-01-31 NOTE — MR AVS SNAPSHOT
After Visit Summary   1/31/2018    Carmen Nuñez    MRN: [id number]           Patient Information     Date Of Birth          1960        Visit Information        Provider Department      1/31/2018 1:45 PM Luis M Mclean MD [facility]   [place]        Today's Diagnoses     Coronary artery disease involving native coronary artery of native heart without angina pectoris    -  1    Other cardiomyopathy (H)        Tricuspid valve disorders, non-rheumatic        Hyperlipidemia LDL goal <70        Pre-operative cardiovascular examination        Encounter for pre-operative cardiovascular clearance           Follow-ups after your visit        Additional Services     Follow-Up with Cardiologist                 Future tests that were ordered for you today     Open Future Orders        Priority Expected Expires Ordered    Echocardiogram Routine 5/31/2018 1/31/2019 1/31/2018    Follow-Up with Cardiologist Routine 5/31/2018 1/31/2019 1/31/2018            Who to contact     If you have questions or need follow up information about today's clinic visit or your schedule please contact [facility]   [place] directly at [phone].  Normal or non-critical lab and imaging results will be communicated to you by [facility], letter or phone within 4 business days after the clinic has received the results. If you do not hear from us within 7 days, please contact the clinic through [facility] or phone. If you have a critical or abnormal lab result, we will notify you by phone as soon as possible.  Submit refill requests through Whatever or call your pharmacy and they will forward the refill request to us. Please allow 3 business days for your refill to be completed.          Additional Information About Your Visit        [facility] Information     Whatever lets you send messages to your doctor, view your test results, renew your prescriptions,  "schedule appointments and more. To sign up, go to [email or web address] . Click on \"Log in\" on the left side of the screen, which will take you to the Welcome page. Then click on \"Sign up Now\" on the right side of the page.     You will be asked to enter the access code listed below, as well as some personal information. Please follow the directions to create your username and password.     Your access code is: [email or web address]  Expires: 2018 12:42 PM     Your access code will  in 90 days. If you need help or a new code, please call your [place] clinic or [phone].        Care EveryWhere ID     This is your Care EveryWhere ID. This could be used by other organizations to access your [place] medical records  [id number]        Your Vitals Were     Pulse Height Pulse Oximetry BMI (Body Mass Index)          81 1.651 m (5' 5\") 98% 35.94 kg/m2         Blood Pressure from Last 3 Encounters:   18 110/66   18 118/70   17 113/76    Weight from Last 3 Encounters:   18 98 kg (216 lb)   18 97.5 kg (215 lb)   17 97.5 kg (215 lb)              We Performed the Following     EKG 12-LEAD CLINIC READ ([facility])- performed today        Primary Care Provider Office Phone # Fax #    Jackelyn Eugene Sims [phone]       [address] [place] [facility] [place] [address]        Equal Access to Services     [facility]: Hadii pam lord hadasho Soomaali, waaxda luqadaha, qaybta kaalmada edie, zeina salamanca . So [facility] [phone].    ATENCIÓN: Si habla español, tiene a vu disposición servicios gratuitos de asistencia lingüística. Llame al [phone].    We comply with applicable federal civil rights laws and [place] laws. We do not discriminate on the basis of race, color, national origin, age, disability, sex, sexual orientation, or gender identity.            Thank you!     Thank you for choosing [facility]" [facility]   [place]  for your care. Our goal is always to provide you with excellent care. Hearing back from our patients is one way we can continue to improve our services. Please take a few minutes to complete the written survey that you may receive in the mail after your visit with us. Thank you!             Your Updated Medication List - Protect others around you: Learn how to safely use, store and throw away your medicines at www.disposemymeds.org.          This list is accurate as of 1/31/18  2:20 PM.  Always use your most recent med list.                   Brand Name Dispense Instructions for use Diagnosis    amitriptyline 10 MG tablet    ELAVIL    90 tablet    Take 2 tablets by mouth At Bedtime.        aspirin 81 MG tablet      Take 81 mg by mouth daily        atorvastatin 20 MG tablet    LIPITOR    90 tablet    Take 1 tablet (20 mg) by mouth daily    Hyperlipidemia LDL goal <70       calcium 500 MG Chew      1 per day        citalopram 40 MG tablet    celeXA    90 tablet    TAKE ONE TABLET BY MOUTH AT BEDTIME    Adjustment disorder with mixed anxiety and depressed mood       desonide 0.05 % cream    DESOWEN    15 g    Apply sparingly to affected area three times daily for 14 days.    Eyelid dermatitis, eczematous       fluticasone 50 MCG/ACT spray    FLONASE    1 Bottle    Spray 1-2 sprays into both nostrils daily    Seasonal allergic rhinitis due to other allergic trigger       metoprolol succinate 50 MG 24 hr tablet    TOPROL-XL    90 tablet    Take 1 tablet (50 mg) by mouth daily    Coronary artery disease involving other coronary artery bypass graft with other forms of angina pectoris (H)       MULTIVITAMIN TABS   OR      one tablet daily        nitroGLYcerin 0.4 MG sublingual tablet    NITROSTAT    25 tablet    Place 1 tablet (0.4 mg) under the tongue every 5 minutes as needed for chest pain    Non-ST elevation myocardial infarction, subsequent care episode (H)       OMEGA-3 FISH OIL PO            polyethylene glycol powder    MIRALAX    510 g    Take 17 g (1 capful) by mouth daily    Other constipation       RIZATRIPTAN BENZOATE PO      Take 10 mg by mouth

## 2018-01-31 NOTE — LETTER
1/31/2018    Jackelyn Sims MD  [address]  [place] [address]    RE: Carmen Nuñez       Dear Colleague,    I had the pleasure of seeing Carmen Nuñez in the [facility].    HPI and Plan:   Ms. Nuñez is a pleasant 57-year-old female with a history of coronary disease.  She had a previous coronary artery bypass grafting in 2011 with a LIMA to an LAD, vein graft to an obtuse marginal and vein graft to the distal right coronary artery.  At the time she had presented with heart attack and her primary symptom was pain between her shoulder blades.  She did not have typical symptoms.     She had no complications following her bypass surgery.  She is being treated for this including hypertension and dyslipidemia as well.  She had previously been seen by Dr. Barton for several years.     She last saw Dr. Rebeca torres clinic in April.  At that time, she was doing well.  An EKG was done which had shown sinus rhythm with some T inversions in anterior leads, unchanged from before.  Poor R-wave progression was seen    She works at [employer] and tries to bring her own meals when she is working.  She also admits she does try to exercise, but it is intermittent and not on a regular basis.  Her last cholesterol profile was at the beginning of this month.      In December, she had a fall on the ice and broke her right foot phalanx.  She is scheduled for surgery tomorrow and is here for clearance.  An EKG was done today which reveals sinus rhythm with poor R-wave progression and T inversions in anterior leads, unchanged from prior EKG.    She is quite active despite her foot fracture.  She is able to walk 2 blocks without any symptoms.  She can walk further if it was not for the plaster.  She can also climb a flight of stairs or 2 without any complaints.      PHYSICAL EXAMINATION:   As below    Impression  1.  Preoperative cardiac clearance  At this time, patient is going  to have a low risk right foot fracture surgery tomorrow.  Her EKG was abnormal, is consistent with previous EKG is unchanged from before.  She also is able to do at least 5 metastases of activity without any chest pain or shortness of breath or any cardiac symptoms.  With that in mind, I think her cardiac status is stable and can allow her to proceed with surgery with low to moderate perioperative cardiac risk.  I have suggested to hold off on fish oil capsules 2 after surgery.  She will take her metoprolol and atorvastatin on the day of surgery.  She has continued baby aspirin through today.  We will fax this note to the surgeon.  Copy of EKG will also be faxed.  I reviewed the risks of surgery with respect to her cardiac status in detail today.    2.  Coronary artery disease  Previous bypass surgery.  She is scheduled for cardiology follow-up with Dr. Jose later this year along with a stress test.  I will add an echocardiogram to that as she has not had one for years.    3.  Hyperlipidemia  Continue atorvastatin    Thank you for allowing us to part in the care of this nice patient    Sincerely,    Luis M Mclean MD      cc:   Camryn Jett MD   [address]  [address]         Orders Placed This Encounter   Procedures     Follow-Up with Cardiologist     EKG 12[facility]LEAD CLINIC READ [facility] performed today     Echocardiogram       Orders Placed This Encounter   Medications     RIZATRIPTAN BENZOATE PO     Sig: Take 10 mg by mouth       There are no discontinued medications.      Encounter Diagnoses   Name Primary?     Coronary artery disease involving native coronary artery of native heart without angina pectoris Yes     Other cardiomyopathy (H)      Tricuspid valve disorders, non-rheumatic      Hyperlipidemia LDL goal <70      Pre-operative cardiovascular examination      Encounter for pre-operative cardiovascular clearance        CURRENT MEDICATIONS:  Current  Outpatient Prescriptions   Medication Sig Dispense Refill     RIZATRIPTAN BENZOATE PO Take 10 mg by mouth       citalopram (CELEXA) 40 MG tablet TAKE ONE TABLET BY MOUTH AT BEDTIME 90 tablet 0     polyethylene glycol (MIRALAX) powder Take 17 g (1 capful) by mouth daily 510 g 1     fluticasone (FLONASE) 50 MCG/ACT spray Spray 1-2 sprays into both nostrils daily 1 Bottle 11     metoprolol (TOPROL-XL) 50 MG 24 hr tablet Take 1 tablet (50 mg) by mouth daily 90 tablet 3     atorvastatin (LIPITOR) 20 MG tablet Take 1 tablet (20 mg) by mouth daily 90 tablet 3     Omega-3 Fatty Acids (OMEGA-3 FISH OIL PO)        nitroglycerin (NITROSTAT) 0.4 MG SL tablet Place 1 tablet (0.4 mg) under the tongue every 5 minutes as needed for chest pain 25 tablet 1     desonide (DESOWEN) 0.05 % cream Apply sparingly to affected area three times daily for 14 days. 15 g 0     aspirin 81 MG tablet Take 81 mg by mouth daily       amitriptyline (ELAVIL) 10 MG tablet Take 2 tablets by mouth At Bedtime. 90 tablet 2     CALCIUM 500 MG PO CHEW 1 per day       MULTIVITAMIN TABS   OR one tablet daily         ALLERGIES     Allergies   Allergen Reactions     Augmentin GI Disturbance     can take penicillin     Dye [Contrast Dye] Hives     hydroxizine Hcl does help prevent hives.       PAST MEDICAL HISTORY:  Past Medical History:   Diagnosis Date     Abnormal glandular Papanicolaou smear of cervix 1985    colposcopy and cryotherapy     Acute gastritis without mention of hemorrhage 10/05    NSAID related     Acute non-ST-elevation MI following previous MI (H)      Two drug-eluting stents placed to the LAD.      Allergic rhinitis due to other allergen     molds, pets, grasses, pollen - on immunotherapy     Calculus of gallbladder without mention of cholecystitis or obstruction 1985    cholecystectomy     Cardiomyopathy (H)      Carpal tunnel syndrome 12/07    rt s/p ortho eval and EMG     Coronary artery disease     CABG 1/24/2011: LIMA to LAD, SVG to OM,  SVG to RCA, cardiac cath 2010: RAMOS x2 to LAD     Depressive disorder, not elsewhere classified      Excessive or frequent menstruation     vaginal hysterectomy, ovaries remain     Family history of malignant neoplasm of breast     father     Headache(784.0)     frontal     Hyperlipidaemia      Hypertension      Intramural leiomyoma of uterus      Iron deficiency anemia, unspecified 10/05    s/p EGD, colonoscopy, SBFT pos erosive gastritis nsaid related     Mixed hyperlipidemia      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 Ramsay     Supervision of other normal pregnancy      - vaginal     Tricuspid valve disorders, non-rheumatic     per  echo, mild-mod (1-2+) TR       PAST SURGICAL HISTORY:  Past Surgical History:   Procedure Laterality Date     C APPENDECTOMY  1985    incidental     C NONSPECIFIC PROCEDURE      colposcopy/cryotherapy of cervix for abnl pap smear     C NONSPECIFIC PROCEDURE      clipping ruptured cerebral aneurysm     C NONSPECIFIC PROCEDURE  10/05    UGI with SBFT nl     C VAGINAL HYSTERECTOMY      partial ovaries remain     CHOLECYSTECTOMY, OPEN       CORONARY ANGIOGRAPHY ADULT ORDER  11      New critical left main stenosis of 80-90% , CABG recommended     CORONARY ARTERY BYPASS  1-24-11    X3  LIMA to LAD, SVG to OM, SVG aorta to distal RCA     CRANIOTOMY      resection on brai aneurysm at age 31      CRANIOTOMY, REPAIR ANEURYSM, COMBINED      at age 31      HC COLONOSCOPY THRU STOMA, DIAGNOSTIC  10/05    bx of terminal ileal nodules benign     HC UGI ENDOSCOPY DIAG W OR W/O BRUSH/WASH  10/05    erosive  gastritis, neg sprue bx     HEART CATH, ANGIOPLASTY  Nov 2010    intracoronary stenting in the mid LAD.       HYSTERECTOMY, PAP NO LONGER INDICATED       HYSTERECTOMY, PAP NO LONGER INDICATED       TONSILLECTOMY      tonsillectomy       FAMILY HISTORY:  Family History   Problem Relation Age of Onset     C.A.D. Mother      HEART DISEASE Mother      heart disease     CEREBROVASCULAR DISEASE Mother      age 70s     DIABETES Mother      type 2     Hypertension Mother      Lipids Mother      Myocardial Infarction Mother      CEREBROVASCULAR DISEASE Father      age mid 70s     CANCER Father      breast     Breast Cancer Father      onset age 72     Alcohol/Drug Father      Cardiovascular Father      abdominal aortic aneurysm, smoker     Myocardial Infarction Brother      2        SOCIAL HISTORY:  Social History     Social History     Marital status:      Spouse name: N/A     Number of children: 1     Years of education: 12     Occupational History      [employer]     and delivers trays to customers     Social History Main Topics     Smoking status: Former Smoker     Packs/day: 2.00     Years: 22.00     Types: Cigarettes     Quit date: 1/1/1994     Smokeless tobacco: Never Used     Alcohol use No      Comment: quit 1994     Drug use: No      Comment: quit amphetamines 1994     Sexual activity: Not Currently     Other Topics Concern      Service No     Blood Transfusions No     Caffeine Concern No      no  cofee,diet mountain dew 3-4 cans daily     Occupational Exposure No     Hobby Hazards No     Sleep Concern No     Stress Concern No     Weight Concern Yes     Special Diet No     supplement plus cheese     Back Care Yes     Exercise Yes     3-4 times weekly,treadmill,bike,weights-workout 3 hours     Bike Helmet No     Seat Belt Yes     Self-Exams Yes     Social History Narrative    Lives with .  Daughter age 28.               Review of Systems:  Skin:  Negative       Eyes:  Negative      ENT:   "Negative      Respiratory:  Negative       Cardiovascular:  Negative      Gastroenterology: Negative      Genitourinary:  Negative      Musculoskeletal:  Negative      Neurologic:  Negative      Psychiatric:    depression;anxiety    Heme/Lymph/Imm:  Negative      Endocrine:  Negative        Physical Exam:  Vitals: /66  Pulse 81  Ht 1.651 m (5' 5\")  Wt 98 kg (216 lb)  SpO2 98%  BMI 35.94 kg/m2    Constitutional:    obese      Skin:  warm and dry to the touch          Head:  normocephalic        Eyes:  pupils equal and round        Lymph:      ENT:  no pallor or cyanosis        Neck:  carotid pulses are full and equal bilaterally        Respiratory:  clear to auscultation;normal symmetry         Cardiac: regular rhythm;no murmurs, gallops or rubs detected                pulses full and equal                                        GI:  abdomen soft obese      Extremities and Muscular Skeletal:  Rt foot plaster          Neurological:  no gross motor deficits        Psych:  Alert and Oriented x 3      Thank you for allowing me to participate in the care of your patient.    Sincerely,     Luis M Mclean MD     [facility] [place]    "

## 2018-01-31 NOTE — PROGRESS NOTES
HPI and Plan:   Ms. Nuñez is a pleasant 57-year-old female with a history of coronary disease.  She had a previous coronary artery bypass grafting in 2011 with a LIMA to an LAD, vein graft to an obtuse marginal and vein graft to the distal right coronary artery.  At the time she had presented with heart attack and her primary symptom was pain between her shoulder blades.  She did not have typical symptoms.     She had no complications following her bypass surgery.  She is being treated for this including hypertension and dyslipidemia as well.  She had previously been seen by Dr. Barton for several years.    She last saw Dr. Jose later clinic in April.  At that time, she was doing well.  An EKG was done which had shown sinus rhythm with some T inversions in anterior leads, unchanged from before.  Poor R-wave progression was seen    She works at [employer] and tries to bring her own meals when she is working.  She also admits she does try to exercise, but it is intermittent and not on a regular basis.  Her last cholesterol profile was at the beginning of this month.      In December, she had a fall on the ice and broke her right foot phalanx.  She is scheduled for surgery tomorrow and is here for clearance.  An EKG was done today which reveals sinus rhythm with poor R-wave progression and T inversions in anterior leads, unchanged from prior EKG.    She is quite active despite her foot fracture.  She is able to walk 2 blocks without any symptoms.  She can walk further if it was not for the plaster.  She can also climb a flight of stairs or 2 without any complaints.      PHYSICAL EXAMINATION:   As below    Impression  1.  Preoperative cardiac clearance  At this time, patient is going to have a low risk right foot fracture surgery tomorrow.  Her EKG was abnormal, is consistent with previous EKG is unchanged from before.  She also is able to do at least 5 metastases of activity without any chest pain or shortness of  breath or any cardiac symptoms.  With that in mind, I think her cardiac status is stable and can allow her to proceed with surgery with low to moderate perioperative cardiac risk.  I have suggested to hold off on fish oil capsules 2 after surgery.  She will take her metoprolol and atorvastatin on the day of surgery.  She has continued baby aspirin through today.  We will fax this note to the surgeon.  Copy of EKG will also be faxed.  I reviewed the risks of surgery with respect to her cardiac status in detail today.    2.  Coronary artery disease  Previous bypass surgery.  She is scheduled for cardiology follow-up with Dr. Jose later this year along with a stress test.  I will add an echocardiogram to that as she has not had one for years.    3.  Hyperlipidemia  Continue atorvastatin    Thank you for allowing us to part in the care of this nice patient    Sincerely,    Luis M Mclean MD      cc:   [place] [address]         Orders Placed This Encounter   Procedures     Follow-Up with Cardiologist     EKG [facility] performed today     Echocardiogram       Orders Placed This Encounter   Medications     RIZATRIPTAN BENZOATE PO     Sig: Take 10 mg by mouth       There are no discontinued medications.      Encounter Diagnoses   Name Primary?     Coronary artery disease involving native coronary artery of native heart without angina pectoris Yes     Other cardiomyopathy (H)      Tricuspid valve disorders, non-rheumatic      Hyperlipidemia LDL goal <70      Pre-operative cardiovascular examination      Encounter for pre-operative cardiovascular clearance        CURRENT MEDICATIONS:  Current Outpatient Prescriptions   Medication Sig Dispense Refill     RIZATRIPTAN BENZOATE PO Take 10 mg by mouth       citalopram (CELEXA) 40 MG tablet TAKE ONE TABLET BY MOUTH AT BEDTIME 90 tablet 0     polyethylene glycol (MIRALAX) powder  Take 17 g (1 capful) by mouth daily 510 g 1     fluticasone (FLONASE) 50 MCG/ACT spray Spray 1-2 sprays into both nostrils daily 1 Bottle 11     metoprolol (TOPROL-XL) 50 MG 24 hr tablet Take 1 tablet (50 mg) by mouth daily 90 tablet 3     atorvastatin (LIPITOR) 20 MG tablet Take 1 tablet (20 mg) by mouth daily 90 tablet 3     Omega-3 Fatty Acids (OMEGA-3 FISH OIL PO)        nitroglycerin (NITROSTAT) 0.4 MG SL tablet Place 1 tablet (0.4 mg) under the tongue every 5 minutes as needed for chest pain 25 tablet 1     desonide (DESOWEN) 0.05 % cream Apply sparingly to affected area three times daily for 14 days. 15 g 0     aspirin 81 MG tablet Take 81 mg by mouth daily       amitriptyline (ELAVIL) 10 MG tablet Take 2 tablets by mouth At Bedtime. 90 tablet 2     CALCIUM 500 MG PO CHEW 1 per day       MULTIVITAMIN TABS   OR one tablet daily         ALLERGIES     Allergies   Allergen Reactions     Augmentin GI Disturbance     can take penicillin     Dye [Contrast Dye] Hives     hydroxizine Hcl does help prevent hives.       PAST MEDICAL HISTORY:  Past Medical History:   Diagnosis Date     Abnormal glandular Papanicolaou smear of cervix 1985    colposcopy and cryotherapy     Acute gastritis without mention of hemorrhage 10/05    NSAID related     Acute non-ST-elevation MI following previous MI (H)      Two drug-eluting stents placed to the LAD.      Allergic rhinitis due to other allergen     molds, pets, grasses, pollen - on immunotherapy     Calculus of gallbladder without mention of cholecystitis or obstruction 1985    cholecystectomy     Cardiomyopathy (H)      Carpal tunnel syndrome 12/07    rt s/p ortho eval and EMG     Coronary artery disease     CABG 1/24/2011: LIMA to LAD, SVG to OM, SVG to RCA, cardiac cath 2010: RAMOS x2 to LAD     Depressive disorder, not elsewhere classified      Excessive or frequent menstruation 2003    vaginal hysterectomy, ovaries remain     Family history of malignant neoplasm of breast      father     Headache(784.0)     frontal     Hyperlipidaemia      Hypertension      Intramural leiomyoma of uterus      Iron deficiency anemia, unspecified 10/05    s/p EGD, colonoscopy, SBFT pos erosive gastritis nsaid related     Mixed hyperlipidemia      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 Ramsay     Supervision of other normal pregnancy      - vaginal     Tricuspid valve disorders, non-rheumatic     per  echo, mild-mod (1-2+) TR       PAST SURGICAL HISTORY:  Past Surgical History:   Procedure Laterality Date     C APPENDECTOMY      incidental     C NONSPECIFIC PROCEDURE      colposcopy/cryotherapy of cervix for abnl pap smear     C NONSPECIFIC PROCEDURE      clipping ruptured cerebral aneurysm     C NONSPECIFIC PROCEDURE  10/05    UGI with SBFT nl     C VAGINAL HYSTERECTOMY      partial ovaries remain     CHOLECYSTECTOMY, OPEN       CORONARY ANGIOGRAPHY ADULT ORDER  11      New critical left main stenosis of 80-90% , CABG recommended     CORONARY ARTERY BYPASS  1-24-11    X3  LIMA to LAD, SVG to OM, SVG aorta to distal RCA     CRANIOTOMY      resection on brai aneurysm at age 31      CRANIOTOMY, REPAIR ANEURYSM, COMBINED      at age 31      HC COLONOSCOPY THRU STOMA, DIAGNOSTIC  10/05    bx of terminal ileal nodules benign     HC UGI ENDOSCOPY DIAG W OR W/O BRUSH/WASH  10/05    erosive gastritis, neg sprue bx     HEART CATH, ANGIOPLASTY  2010    intracoronary stenting in the mid LAD.       HYSTERECTOMY, PAP NO LONGER INDICATED       HYSTERECTOMY, PAP NO LONGER INDICATED       TONSILLECTOMY      tonsillectomy        FAMILY HISTORY:  Family History   Problem Relation Age of Onset     C.A.D. Mother      HEART DISEASE Mother      heart disease     CEREBROVASCULAR DISEASE Mother      age 70s     DIABETES Mother      type 2     Hypertension Mother      Lipids Mother      Myocardial Infarction Mother      CEREBROVASCULAR DISEASE Father      age mid 70s     CANCER Father      breast     Breast Cancer Father      onset age 72     Alcohol/Drug Father      Cardiovascular Father      abdominal aortic aneurysm, smoker     Myocardial Infarction Brother      2        SOCIAL HISTORY:  Social History     Social History     Marital status:      Spouse name: N/A     Number of children: 1     Years of education: 12     Occupational History      [employer]     and delivers trays to customers     Social History Main Topics     Smoking status: Former Smoker     Packs/day: 2.00     Years: 22.00     Types: Cigarettes     Quit date: 1/1/1994     Smokeless tobacco: Never Used     Alcohol use No      Comment: quit 1994     Drug use: No      Comment: quit amphetamines 1994     Sexual activity: Not Currently     Other Topics Concern      Service No     Blood Transfusions No     Caffeine Concern No      no  cofee,diet mountain dew 3-4 cans daily     Occupational Exposure No     Hobby Hazards No     Sleep Concern No     Stress Concern No     Weight Concern Yes     Special Diet No     supplement plus cheese     Back Care Yes     Exercise Yes     3-4 times weekly,treadmill,bike,weights-workout 3 hours     Bike Helmet No     Seat Belt Yes     Self-Exams Yes     Social History Narrative    Lives with .  Daughter age 28.               Review of Systems:  Skin:  Negative       Eyes:  Negative      ENT:  Negative      Respiratory:  Negative       Cardiovascular:  Negative      Gastroenterology: Negative      Genitourinary:  Negative      Musculoskeletal:  Negative      Neurologic:  Negative      Psychiatric:    depression;anxiety   "  Heme/Lymph/Imm:  Negative      Endocrine:  Negative        Physical Exam:  Vitals: /66  Pulse 81  Ht 1.651 m (5' 5\")  Wt 98 kg (216 lb)  SpO2 98%  BMI 35.94 kg/m2    Constitutional:    obese      Skin:  warm and dry to the touch          Head:  normocephalic        Eyes:  pupils equal and round        Lymph:      ENT:  no pallor or cyanosis        Neck:  carotid pulses are full and equal bilaterally        Respiratory:  clear to auscultation;normal symmetry         Cardiac: regular rhythm;no murmurs, gallops or rubs detected                pulses full and equal                                        GI:  abdomen soft obese      Extremities and Muscular Skeletal:  Rt foot plaster          Neurological:  no gross motor deficits        Psych:  Alert and Oriented x 3        CC  No referring provider defined for this encounter.              "

## 2018-02-01 ENCOUNTER — TRANSFERRED RECORDS (OUTPATIENT)
Dept: HEALTH INFORMATION MANAGEMENT | Facility: CLINIC | Age: 58
End: 2018-02-01

## 2018-02-15 ENCOUNTER — TELEPHONE (OUTPATIENT)
Dept: FAMILY MEDICINE | Facility: CLINIC | Age: 58
End: 2018-02-15

## 2018-02-15 NOTE — TELEPHONE ENCOUNTER
I am not sure if I can do that ?  Normally pharmacy just do their  revaccination on their own   or she can come to our clinic on a [place] pharmacy

## 2018-02-15 NOTE — TELEPHONE ENCOUNTER
Pt called and is requesting an rx for the Prevnar 13. She would like it sent to [facility] [place] (that is where she will get the shot.)  Please place the order. Thank you.  Ana Mahajan,

## 2018-02-16 NOTE — TELEPHONE ENCOUNTER
Form received from [facility] for PCP to sign  It is not a revaccination; patient is just wanting to have it done at the [facility] pharmacy but they need a prescription for it (which is what form request is)  TC called patient and informed  Form placed on PCP's keyboard to sign  TC will fax back to [facility] and inform patient on Monday  Johnna HENSON

## 2018-02-16 NOTE — TELEPHONE ENCOUNTER
Reason for Call:  Other returning call    Detailed comments: Pt called this afternoon returning a phone call to speak with a TC. Please give pt a call back ASAP. Thank you.    Phone Number Patient can be reached at: Home number on file [phone] (home)    Best Time:     Can we leave a detailed message on this number? YES    Call taken on 2/16/2018 at 12:40 PM by Kareen De Los Santos

## 2018-02-22 ENCOUNTER — OFFICE VISIT (OUTPATIENT)
Dept: FAMILY MEDICINE | Facility: CLINIC | Age: 58
End: 2018-02-22
Payer: COMMERCIAL

## 2018-02-22 VITALS
HEART RATE: 71 BPM | BODY MASS INDEX: 35.99 KG/M2 | SYSTOLIC BLOOD PRESSURE: 113 MMHG | HEIGHT: 65 IN | DIASTOLIC BLOOD PRESSURE: 72 MMHG | TEMPERATURE: 97 F | WEIGHT: 216 LBS | OXYGEN SATURATION: 96 %

## 2018-02-22 DIAGNOSIS — B07.0 PLANTAR WART: ICD-10-CM

## 2018-02-22 DIAGNOSIS — Z23 NEED FOR PROPHYLACTIC VACCINATION AGAINST STREPTOCOCCUS PNEUMONIAE (PNEUMOCOCCUS): Primary | ICD-10-CM

## 2018-02-22 PROCEDURE — 17110 DESTRUCTION B9 LES UP TO 14: CPT | Performed by: FAMILY MEDICINE

## 2018-02-22 PROCEDURE — 90732 PPSV23 VACC 2 YRS+ SUBQ/IM: CPT | Performed by: FAMILY MEDICINE

## 2018-02-22 PROCEDURE — 90471 IMMUNIZATION ADMIN: CPT | Performed by: FAMILY MEDICINE

## 2018-02-22 NOTE — NURSING NOTE
"Chief Complaint   Patient presents with     Plantar Wart       Initial /72  Pulse 71  Temp 97  F (36.1  C) (Tympanic)  Ht 5' 5\" (1.651 m)  Wt 216 lb (98 kg)  SpO2 96%  BMI 35.94 kg/m2 Estimated body mass index is 35.94 kg/(m^2) as calculated from the following:    Height as of this encounter: 5' 5\" (1.651 m).    Weight as of this encounter: 216 lb (98 kg).  Medication Reconciliation: complete  "

## 2018-02-22 NOTE — PROGRESS NOTES
SUBJECTIVE:   aCrmen Nuñez is a 57 year old female who presents to clinic today for the following health issues:      PLANTAR WART(S)      Onset: days     Description (location/number): left foot between last two little toes, has it for a while but lately it is hurting     Accompanying signs and symptoms: Painful: YES    History: prior warts: YES although it was a different area and she has been treated.  this is new but looks just like that    Therapies tried and outcome: None      PROBLEMS TO ADD ON...  She also wished to get her pneumonia vaccine today as well.    Problem list and histories reviewed & adjusted, as indicated.  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, in remission     Allergic rhinitis due to other allergen     Acute gastritis     Carpal tunnel syndrome     Obesity     Hyperlipidemia LDL goal <70     Non-ST elevation myocardial infarction, subsequent care episode     Impingement syndrome of right shoulder     History of colonic polyps     Advanced directives, counseling/discussion     Coronary artery disease     Cardiomyopathy (H)     Nonspecific abnormal results of liver function study     S/P rotator cuff repair     Adjustment disorder with mixed anxiety and depressed mood     BPPV (benign paroxysmal positional vertigo), unspecified laterality     Brain aneurysm     Acute left ankle pain     Acute pain of left knee     Tricuspid valve disorders, non-rheumatic     Other constipation     Encounter for pre-operative cardiovascular clearance     Past Surgical History:   Procedure Laterality Date     C APPENDECTOMY  1985    incidental     C NONSPECIFIC PROCEDURE  1985    colposcopy/cryotherapy of cervix for abnl pap smear     C NONSPECIFIC PROCEDURE  1992    clipping ruptured cerebral aneurysm     C NONSPECIFIC PROCEDURE  10/05    UGI with SBFT nl  "    C VAGINAL HYSTERECTOMY  04/03    partial ovaries remain     CHOLECYSTECTOMY, OPEN  1985     CORONARY ANGIOGRAPHY ADULT ORDER  1-20-11      New critical left main stenosis of 80-90% , CABG recommended     CORONARY ARTERY BYPASS  1-24-11    X3 1/11 LIMA to LAD, SVG to OM, SVG aorta to distal RCA     CRANIOTOMY      resection on brai aneurysm at age 31      CRANIOTOMY, REPAIR ANEURYSM, COMBINED      at age 31      HC COLONOSCOPY THRU STOMA, DIAGNOSTIC  10/05    bx of terminal ileal nodules benign     HC UGI ENDOSCOPY DIAG W OR W/O BRUSH/WASH  10/05    erosive gastritis, neg sprue bx     HEART CATH, ANGIOPLASTY  Nov 2010    intracoronary stenting in the mid LAD.       HYSTERECTOMY, PAP NO LONGER INDICATED       HYSTERECTOMY, PAP NO LONGER INDICATED       TONSILLECTOMY      tonsillectomy       Social History   Substance Use Topics     Smoking status: Former Smoker     Packs/day: 2.00     Years: 22.00     Types: Cigarettes     Quit date: 1/1/1994     Smokeless tobacco: Never Used     Alcohol use No      Comment: quit 1994     Family History   Problem Relation Age of Onset     C.A.D. Mother      HEART DISEASE Mother      heart disease     CEREBROVASCULAR DISEASE Mother      age 70s     DIABETES Mother      type 2     Hypertension Mother      Lipids Mother      Myocardial Infarction Mother      CEREBROVASCULAR DISEASE Father      age mid 70s     CANCER Father      breast     Breast Cancer Father      onset age 72     Alcohol/Drug Father      Cardiovascular Father      abdominal aortic aneurysm, smoker     Myocardial Infarction Brother      2            Reviewed and updated as needed this visit by clinical staff       Reviewed and updated as needed this visit by Provider         ROS:  Constitutional, HEENT, cardiovascular, pulmonary, GI, , musculoskeletal, neuro, skin, endocrine and psych systems are negative, except as otherwise noted.    OBJECTIVE:     /72  Pulse 71  Temp 97  F (36.1  C) (Tympanic)  Ht 5' 5\" " (1.651 m)  Wt 216 lb (98 kg)  SpO2 96%  BMI 35.94 kg/m2  Body mass index is 35.94 kg/(m^2).  GENERAL: healthy, alert and no distress  CV: regular rate and rhythm, normal S1 S2, no S3 or S4, no murmur,   Lungs are clear  MS: no gross musculoskeletal defects noted, no edema  SKIN: Left  foot fifth toe small 3-4 mm size , wart the inner side of her toe        ASSESSMENT/PLAN:       (B07.0) Plantar wart  Comment: left 5th toe   Plan: DESTRUCT BENIGN LESION, UP TO 14       discussed cares , concerns and talked about treatment option. She wish to proceed with treatment. Wart shaved and  lesion frozen with LN x2. Patient tolerated procedure well. Band-Aid applied  . Skin cares discussed follow up in  2-3 weeks if wart persist, sooner if problem .               (Z23) Need for prophylactic vaccination against Streptococcus pneumoniae (pneumococcus)  (primary encounter diagnosis)  Comment:   Plan: Pneumococcal vaccine 23 valent PPSV23          (Pneumovax) [57201]            .  Patient expressed understanding and agreement with treatment plan. All patient's questions were answered, will let me know if has more later.  Medications: Rx's: Reviewed the potential side effects/complications of medications prescribed.         Jackelyn Sims MD  [facility] [place]

## 2018-02-22 NOTE — MR AVS SNAPSHOT
"              After Visit Summary   2/22/2018    Carmen Nuñez    MRN: [id number]           Patient Information     Date Of Birth          1960        Visit Information        Provider Department      2/22/2018 1:40 PM Jackelyn Sims MD [facility] [place]        Today's Diagnoses     Need for prophylactic vaccination against Streptococcus pneumoniae (pneumococcus)    -  1    Plantar wart          Care Instructions    Skin Cares and symptomatic treatment discussed follow up if problem               Follow-ups after your visit        Follow-up notes from your care team     Return in about 3 weeks (around 3/15/2018).      Who to contact     If you have questions or need follow up information about today's clinic visit or your schedule please contact [facility] [place] directly at [phone].  Normal or non-critical lab and imaging results will be communicated to you by MyChart, letter or phone within 4 business days after the clinic has received the results. If you do not hear from us within 7 days, please contact the clinic through MyChart or phone. If you have a critical or abnormal lab result, we will notify you by phone as soon as possible.  Submit refill requests through [facility] or call your pharmacy and they will forward the refill request to us. Please allow 3 business days for your refill to be completed.          Additional Information About Your Visit        MyChart Information     [facility] lets you send messages to your doctor, view your test results, renew your prescriptions, schedule appointments and more. To sign up, go to [email or web address] . Click on \"Log in\" on the left side of the screen, which will take you to the Welcome page. Then click on \"Sign up Now\" on the right side of the page.     You will be asked to enter the access code listed below, as well as some personal information. Please follow the directions to create your username and password.   " "  Your access code is: [email or web address]  Expires: 2018 12:42 PM     Your access code will  in 90 days. If you need help or a new code, please call your [facility] clinic or [phone].        Care EveryWhere ID     This is your Care EveryWhere ID. This could be used by other organizations to access your [facility] medical records  [id number]        Your Vitals Were     Pulse Temperature Height Pulse Oximetry BMI (Body Mass Index)       71 97  F (36.1  C) (Tympanic) 5' 5\" (1.651 m) 96% 35.94 kg/m2        Blood Pressure from Last 3 Encounters:   18 113/72   18 110/66   18 118/70    Weight from Last 3 Encounters:   18 216 lb (98 kg)   18 216 lb (98 kg)   18 215 lb (97.5 kg)              We Performed the Following     DESTRUCT BENIGN LESION, UP TO 14     Pneumococcal vaccine 23 valent PPSV23  (Pneumovax) [12809]        Primary Care Provider Office Phone # Fax #    Jackelyn Sims [phone]       [address]  [place] [address]        Equal Access to Services     [facility]: Hadii pma ku hadasho Soomaali, waaxda luqadaha, qaybta kaalmada adeegyada, waxay rodrigo salamanca . So [facility] [phone].    ATENCIÓN: Si habla español, tiene a vu disposición servicios gratuitos de asistencia lingüística. Llame al [phone].    We comply with applicable federal civil rights laws and [place] laws. We do not discriminate on the basis of race, color, national origin, age, disability, sex, sexual orientation, or gender identity.            Thank you!     Thank you for choosing [facility] [place]  for your care. Our goal is always to provide you with excellent care. Hearing back from our patients is one way we can continue to improve our services. Please take a few minutes to complete the written survey that you may receive in the mail after your visit with us. Thank you!             Your Updated Medication List - Protect " others around you: Learn how to safely use, store and throw away your medicines at www.disposemymeds.org.          This list is accurate as of 2/22/18  2:13 PM.  Always use your most recent med list.                   Brand Name Dispense Instructions for use Diagnosis    amitriptyline 10 MG tablet    ELAVIL    90 tablet    Take 2 tablets by mouth At Bedtime.        aspirin 81 MG tablet      Take 81 mg by mouth daily        atorvastatin 20 MG tablet    LIPITOR    90 tablet    Take 1 tablet (20 mg) by mouth daily    Hyperlipidemia LDL goal <70       calcium 500 MG Chew      1 per day        citalopram 40 MG tablet    celeXA    90 tablet    TAKE ONE TABLET BY MOUTH AT BEDTIME    Adjustment disorder with mixed anxiety and depressed mood       desonide 0.05 % cream    DESOWEN    15 g    Apply sparingly to affected area three times daily for 14 days.    Eyelid dermatitis, eczematous       fluticasone 50 MCG/ACT spray    FLONASE    1 Bottle    Spray 1-2 sprays into both nostrils daily    Seasonal allergic rhinitis due to other allergic trigger       metoprolol succinate 50 MG 24 hr tablet    TOPROL-XL    90 tablet    Take 1 tablet (50 mg) by mouth daily    Coronary artery disease involving other coronary artery bypass graft with other forms of angina pectoris (H)       MULTIVITAMIN TABS   OR      one tablet daily        nitroGLYcerin 0.4 MG sublingual tablet    NITROSTAT    25 tablet    Place 1 tablet (0.4 mg) under the tongue every 5 minutes as needed for chest pain    Non-ST elevation myocardial infarction, subsequent care episode (H)       OMEGA-3 FISH OIL PO           polyethylene glycol powder    MIRALAX    510 g    Take 17 g (1 capful) by mouth daily    Other constipation       RIZATRIPTAN BENZOATE PO      Take 10 mg by mouth

## 2018-03-14 ENCOUNTER — OFFICE VISIT (OUTPATIENT)
Dept: FAMILY MEDICINE | Facility: CLINIC | Age: 58
End: 2018-03-14
Payer: COMMERCIAL

## 2018-03-14 VITALS
BODY MASS INDEX: 36.44 KG/M2 | HEART RATE: 68 BPM | DIASTOLIC BLOOD PRESSURE: 80 MMHG | WEIGHT: 219 LBS | SYSTOLIC BLOOD PRESSURE: 126 MMHG | TEMPERATURE: 98.4 F

## 2018-03-14 DIAGNOSIS — B07.0 PLANTAR WARTS: Primary | ICD-10-CM

## 2018-03-14 PROCEDURE — 99213 OFFICE O/P EST LOW 20 MIN: CPT | Performed by: FAMILY MEDICINE

## 2018-03-14 NOTE — PROGRESS NOTES
SUBJECTIVE:   Carmen Nuñez is a 58 year old female who presents to clinic today for the following health issues:      Warts  Duration of complaint: bottom of left foot    Had treatment done BEFORE        Problem list and histories reviewed & adjusted, as indicated.  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, in remission     Allergic rhinitis due to other allergen     Acute gastritis     Carpal tunnel syndrome     Obesity     Hyperlipidemia LDL goal <70     Non-ST elevation myocardial infarction, subsequent care episode     Impingement syndrome of right shoulder     History of colonic polyps     Advanced directives, counseling/discussion     Coronary artery disease     Cardiomyopathy (H)     Nonspecific abnormal results of liver function study     S/P rotator cuff repair     Adjustment disorder with mixed anxiety and depressed mood     BPPV (benign paroxysmal positional vertigo), unspecified laterality     Brain aneurysm     Acute left ankle pain     Acute pain of left knee     Tricuspid valve disorders, non-rheumatic     Other constipation     Encounter for pre-operative cardiovascular clearance     Plantar wart     Past Surgical History:   Procedure Laterality Date     C APPENDECTOMY  1985    incidental     C NONSPECIFIC PROCEDURE  1985    colposcopy/cryotherapy of cervix for abnl pap smear     C NONSPECIFIC PROCEDURE  1992    clipping ruptured cerebral aneurysm     C NONSPECIFIC PROCEDURE  10/05    UGI with SBFT nl     C VAGINAL HYSTERECTOMY  04/03    partial ovaries remain     CHOLECYSTECTOMY, OPEN  1985     CORONARY ANGIOGRAPHY ADULT ORDER  1-20-11      New critical left main stenosis of 80-90% , CABG recommended     CORONARY ARTERY BYPASS  1-24-11    X3 1/11 LIMA to LAD, SVG to OM, SVG aorta to distal RCA     CRANIOTOMY      resection on brai aneurysm at age  31      CRANIOTOMY, REPAIR ANEURYSM, COMBINED      at age 31      HC COLONOSCOPY THRU STOMA, DIAGNOSTIC  10/05    bx of terminal ileal nodules benign     HC UGI ENDOSCOPY DIAG W OR W/O BRUSH/WASH  10/05    erosive gastritis, neg sprue bx     HEART CATH, ANGIOPLASTY  Nov 2010    intracoronary stenting in the mid LAD.       HYSTERECTOMY, PAP NO LONGER INDICATED       HYSTERECTOMY, PAP NO LONGER INDICATED       TONSILLECTOMY      tonsillectomy       Social History   Substance Use Topics     Smoking status: Former Smoker     Packs/day: 2.00     Years: 22.00     Types: Cigarettes     Quit date: 1/1/1994     Smokeless tobacco: Never Used     Alcohol use No      Comment: quit 1994     Family History   Problem Relation Age of Onset     C.A.D. Mother      HEART DISEASE Mother      heart disease     CEREBROVASCULAR DISEASE Mother      age 70s     DIABETES Mother      type 2     Hypertension Mother      Lipids Mother      Myocardial Infarction Mother      CEREBROVASCULAR DISEASE Father      age mid 70s     CANCER Father      breast     Breast Cancer Father      onset age 72     Alcohol/Drug Father      Cardiovascular Father      abdominal aortic aneurysm, smoker     Myocardial Infarction Brother      2          Current Outpatient Prescriptions   Medication Sig Dispense Refill     RIZATRIPTAN BENZOATE PO Take 10 mg by mouth       citalopram (CELEXA) 40 MG tablet TAKE ONE TABLET BY MOUTH AT BEDTIME 90 tablet 0     metoprolol (TOPROL-XL) 50 MG 24 hr tablet Take 1 tablet (50 mg) by mouth daily 90 tablet 3     atorvastatin (LIPITOR) 20 MG tablet Take 1 tablet (20 mg) by mouth daily 90 tablet 3     Omega-3 Fatty Acids (OMEGA-3 FISH OIL PO)        nitroglycerin (NITROSTAT) 0.4 MG SL tablet Place 1 tablet (0.4 mg) under the tongue every 5 minutes as needed for chest pain 25 tablet 1     aspirin 81 MG tablet Take 81 mg by mouth daily       amitriptyline (ELAVIL) 10 MG tablet Take 2 tablets by mouth At Bedtime. 90 tablet 2     CALCIUM  500 MG PO CHEW 1 per day       MULTIVITAMIN TABS   OR one tablet daily       polyethylene glycol (MIRALAX) powder Take 17 g (1 capful) by mouth daily (Patient not taking: Reported on 3/14/2018) 510 g 1     fluticasone (FLONASE) 50 MCG/ACT spray Spray 1-2 sprays into both nostrils daily (Patient not taking: Reported on 3/14/2018) 1 Bottle 11     desonide (DESOWEN) 0.05 % cream Apply sparingly to affected area three times daily for 14 days. (Patient not taking: Reported on 3/14/2018) 15 g 0       Reviewed and updated as needed this visit by clinical staff  Tobacco  Allergies  Meds       Reviewed and updated as needed this visit by Provider         ROS:  CONSTITUTIONAL: NEGATIVE for fever, chills, change in weight  ROS otherwise negative    OBJECTIVE:                                                    /80  Pulse 68  Temp 98.4  F (36.9  C) (Tympanic)  Wt 219 lb (99.3 kg)  BMI 36.44 kg/m2  Body mass index is 36.44 kg/(m^2).   GENERAL: healthy, alert, well nourished, well hydrated, no distress  SMALL WART LIKE STRUCTURE BETWEEN 4 TH AND LITTLE TOE ON THE LEFT SIDE.         ASSESSMENT/PLAN:                                                        ICD-10-CM    1. Plantar warts B07.0      Differential discussed with the patient it could be a plantar wart versus small callus or corn.  After cleaning the superficial surface it was re-freeze with liquid nitrogen ×3.  He she is advised if is not getting better she should follow-up and let us know.  Advised not to wear tight shoes.  Placed some gauze piece between the toes so that they do not touch each other and does not cause further issues.    Jefry Real MD  [facility] [place]

## 2018-03-14 NOTE — NURSING NOTE
"Chief Complaint   Patient presents with     Wart       Initial /80  Pulse 68  Temp 98.4  F (36.9  C) (Tympanic)  Wt 219 lb (99.3 kg)  BMI 36.44 kg/m2 Estimated body mass index is 36.44 kg/(m^2) as calculated from the following:    Height as of 2/22/18: 5' 5\" (1.651 m).    Weight as of this encounter: 219 lb (99.3 kg).  Medication Reconciliation: complete    Current Outpatient Prescriptions   Medication Sig Dispense Refill     RIZATRIPTAN BENZOATE PO Take 10 mg by mouth       citalopram (CELEXA) 40 MG tablet TAKE ONE TABLET BY MOUTH AT BEDTIME 90 tablet 0     metoprolol (TOPROL-XL) 50 MG 24 hr tablet Take 1 tablet (50 mg) by mouth daily 90 tablet 3     atorvastatin (LIPITOR) 20 MG tablet Take 1 tablet (20 mg) by mouth daily 90 tablet 3     Omega-3 Fatty Acids (OMEGA-3 FISH OIL PO)        nitroglycerin (NITROSTAT) 0.4 MG SL tablet Place 1 tablet (0.4 mg) under the tongue every 5 minutes as needed for chest pain 25 tablet 1     aspirin 81 MG tablet Take 81 mg by mouth daily       amitriptyline (ELAVIL) 10 MG tablet Take 2 tablets by mouth At Bedtime. 90 tablet 2     CALCIUM 500 MG PO CHEW 1 per day       MULTIVITAMIN TABS   OR one tablet daily       polyethylene glycol (MIRALAX) powder Take 17 g (1 capful) by mouth daily (Patient not taking: Reported on 3/14/2018) 510 g 1     fluticasone (FLONASE) 50 MCG/ACT spray Spray 1-2 sprays into both nostrils daily (Patient not taking: Reported on 3/14/2018) 1 Bottle 11     desonide (DESOWEN) 0.05 % cream Apply sparingly to affected area three times daily for 14 days. (Patient not taking: Reported on 3/14/2018) 15 g 0       Galo ROA CMA  "

## 2018-03-14 NOTE — MR AVS SNAPSHOT
"              After Visit Summary   3/14/2018    Carmen Nuñez    MRN: [id number]           Patient Information     Date Of Birth          1960        Visit Information        Provider Department      3/14/2018 3:40 PM Jefry Real MD [facility] [place]        Today's Diagnoses     Plantar warts    -  1       Follow-ups after your visit        Follow-up notes from your care team     Return in about 3 weeks (around 2018), or if symptoms worsen or fail to improve.      Who to contact     If you have questions or need follow up information about today's clinic visit or your schedule please [facility] directly at [phone].  Normal or non-critical lab and imaging results will be communicated to you by MyChart, letter or phone within 4 business days after the clinic has received the results. If you do not hear from us within 7 days, please contact the clinic through MyChart or phone. If you have a critical or abnormal lab result, we will notify you by phone as soon as possible.  Submit refill requests through [employer] or call your pharmacy and they will forward the refill request to us. Please allow 3 business days for your refill to be completed.          Additional Information About Your Visit        MyChart Information     [employer] lets you send messages to your doctor, view your test results, renew your prescriptions, schedule appointments and more. To sign up, go to [email or web address] . Click on \"Log in\" on the left side of the screen, which will take you to the Welcome page. Then click on \"Sign up Now\" on the right side of the page.     You will be asked to enter the access code listed below, as well as some personal information. Please follow the directions to create your username and password.     Your access code is: [email or web address]  Expires: 2018  1:42 PM     Your access code will  in 90 days. If you need help or a new code, please call your " [facility] or [phone].        Care EveryWhere ID     This is your Care EveryWhere ID. This could be used by other organizations to access your [place] medical records  [id number]        Your Vitals Were     Pulse Temperature BMI (Body Mass Index)             68 98.4  F (36.9  C) (Tympanic) 36.44 kg/m2          Blood Pressure from Last 3 Encounters:   03/14/18 126/80   02/22/18 113/72   01/31/18 110/66    Weight from Last 3 Encounters:   03/14/18 219 lb (99.3 kg)   02/22/18 216 lb (98 kg)   01/31/18 216 lb (98 kg)              Today, you had the following     No orders found for display       Primary Care Provider Office Phone # Fax #    Jackelyn Eugene Sims [phone] [address]  [place] [address]        Equal Access to Services     [facility]: Hadii aad ku hadasho Soomaali, waaxda luqadaha, qaybta kaalmada adeegyada, waxay barbrain john salamanca . So [facility] [phone].    ATENCIÓN: Si habla español, tiene a vu disposición servicios gratuitos de asistencia lingüística. Llame al [phone].    We comply with applicable federal civil rights laws and [place] laws. We do not discriminate on the basis of race, color, national origin, age, disability, sex, sexual orientation, or gender identity.            Thank you!     Thank you for choosing [facility] [place]  for your care. Our goal is always to provide you with excellent care. Hearing back from our patients is one way we can continue to improve our services. Please take a few minutes to complete the written survey that you may receive in the mail after your visit with us. Thank you!             Your Updated Medication List - Protect others around you: Learn how to safely use, store and throw away your medicines at www.disposemymeds.org.          This list is accurate as of 3/14/18  4:12 PM.  Always use your most recent med list.                   Brand Name Dispense Instructions for use  Diagnosis    amitriptyline 10 MG tablet    ELAVIL    90 tablet    Take 2 tablets by mouth At Bedtime.        aspirin 81 MG tablet      Take 81 mg by mouth daily        atorvastatin 20 MG tablet    LIPITOR    90 tablet    Take 1 tablet (20 mg) by mouth daily    Hyperlipidemia LDL goal <70       calcium 500 MG Chew      1 per day        citalopram 40 MG tablet    celeXA    90 tablet    TAKE ONE TABLET BY MOUTH AT BEDTIME    Adjustment disorder with mixed anxiety and depressed mood       desonide 0.05 % cream    DESOWEN    15 g    Apply sparingly to affected area three times daily for 14 days.    Eyelid dermatitis, eczematous       fluticasone 50 MCG/ACT spray    FLONASE    1 Bottle    Spray 1-2 sprays into both nostrils daily    Seasonal allergic rhinitis due to other allergic trigger       metoprolol succinate 50 MG 24 hr tablet    TOPROL-XL    90 tablet    Take 1 tablet (50 mg) by mouth daily    Coronary artery disease involving other coronary artery bypass graft with other forms of angina pectoris (H)       MULTIVITAMIN TABS   OR      one tablet daily        nitroGLYcerin 0.4 MG sublingual tablet    NITROSTAT    25 tablet    Place 1 tablet (0.4 mg) under the tongue every 5 minutes as needed for chest pain    Non-ST elevation myocardial infarction, subsequent care episode (H)       OMEGA-3 FISH OIL PO           polyethylene glycol powder    MIRALAX    510 g    Take 17 g (1 capful) by mouth daily    Other constipation       RIZATRIPTAN BENZOATE PO      Take 10 mg by mouth

## 2018-04-17 DIAGNOSIS — I25.798 CORONARY ARTERY DISEASE INVOLVING OTHER CORONARY ARTERY BYPASS GRAFT WITH OTHER FORMS OF ANGINA PECTORIS (H): ICD-10-CM

## 2018-04-17 RX ORDER — METOPROLOL SUCCINATE 50 MG/1
50 TABLET, EXTENDED RELEASE ORAL DAILY
Qty: 90 TABLET | Refills: 3 | Status: CANCELLED | OUTPATIENT
Start: 2018-04-17

## 2018-04-17 NOTE — TELEPHONE ENCOUNTER
"Requested Prescriptions   Pending Prescriptions Disp Refills     metoprolol succinate (TOPROL-XL) 50 MG 24 hr tablet  Last Written Prescription Date: 4-6-2017  Last Fill Quantity: 90 tablet,  # refills: 3   Last office visit: 3/14/2018 with prescribing provider:  3-   Future Office Visit:     90 tablet 3     Sig: Take 1 tablet (50 mg) by mouth daily    Beta-Blockers Protocol Passed    4/17/2018 12:56 PM       Passed - Blood pressure under 140/90 in past 12 months    BP Readings from Last 3 Encounters:   03/14/18 126/80   02/22/18 113/72   01/31/18 110/66                Passed - Patient is age 6 or older       Passed - Recent (12 mo) or future (30 days) visit within the authorizing provider's specialty    Patient had office visit in the last 12 months or has a visit in the next 30 days with authorizing provider or within the authorizing provider's specialty.  See \"Patient Info\" tab in inbasket, or \"Choose Columns\" in Meds & Orders section of the refill encounter.                "

## 2018-04-18 DIAGNOSIS — I25.798 CORONARY ARTERY DISEASE INVOLVING OTHER CORONARY ARTERY BYPASS GRAFT WITH OTHER FORMS OF ANGINA PECTORIS (H): ICD-10-CM

## 2018-04-18 RX ORDER — METOPROLOL SUCCINATE 50 MG/1
TABLET, EXTENDED RELEASE ORAL
Qty: 90 TABLET | Refills: 1 | Status: SHIPPED | OUTPATIENT
Start: 2018-04-18 | End: 2018-06-28

## 2018-04-18 NOTE — TELEPHONE ENCOUNTER
"Requested Prescriptions   Pending Prescriptions Disp Refills     metoprolol succinate (TOPROL-XL) 50 MG 24 hr tablet [Pharmacy Med Name: METOPROLOL SUCCINATE ER 50MG TB24]  Last Written Prescription Date:  4-6-2017  Last Fill Quantity: 90 tablet,  # refills: 3   Last office visit: 3/14/2018 with prescribing provider:  3-   Future Office Visit:     210 tablet 0     Sig: TAKE ONE TABLET BY MOUTH EVERY DAY    Beta-Blockers Protocol Passed    4/18/2018  8:30 AM       Passed - Blood pressure under 140/90 in past 12 months    BP Readings from Last 3 Encounters:   03/14/18 126/80   02/22/18 113/72   01/31/18 110/66                Passed - Patient is age 6 or older       Passed - Recent (12 mo) or future (30 days) visit within the authorizing provider's specialty    Patient had office visit in the last 12 months or has a visit in the next 30 days with authorizing provider or within the authorizing provider's specialty.  See \"Patient Info\" tab in inbasket, or \"Choose Columns\" in Meds & Orders section of the refill encounter.              "

## 2018-04-18 NOTE — TELEPHONE ENCOUNTER
Prescription approved per [facility], [facility] or [facility] refill protocol.  Ruth Jernigan RN - Triage  [facility]

## 2018-04-24 DIAGNOSIS — E78.5 HYPERLIPIDEMIA LDL GOAL <70: ICD-10-CM

## 2018-04-24 RX ORDER — ATORVASTATIN CALCIUM 20 MG/1
TABLET, FILM COATED ORAL
Qty: 30 TABLET | Refills: 0 | Status: SHIPPED | OUTPATIENT
Start: 2018-04-24 | End: 2018-05-24

## 2018-04-24 NOTE — TELEPHONE ENCOUNTER
left voicemail message for patient to contact Main Clinic Number to schedule.  NTBS: Medication Check prior to next refill  Johnna HENSON

## 2018-04-24 NOTE — TELEPHONE ENCOUNTER
30 day supply given.  Patient is due for an OV.  Please call and assist with scheduling appointment prior to next refill   Ruth Jernigan RN - Triage  [facility]

## 2018-04-24 NOTE — TELEPHONE ENCOUNTER
"Requested Prescriptions   Pending Prescriptions Disp Refills     atorvastatin (LIPITOR) 20 MG tablet [Pharmacy Med Name: ATORVASTATIN CALCIUM 20MG TABS] 120 tablet 0    Last Written Prescription Date:  4/6/17  Last Fill Quantity: 90,  # refills: 3   Last office visit: 3/14/2018 with prescribing provider:  2/22/18   Future Office Visit:     Sig: TAKE ONE TABLET BY MOUTH EVERY DAY    Statins Protocol Failed    4/24/2018  4:22 AM       Failed - LDL on file in past 12 months    Recent Labs   Lab Test  04/06/17   1007   LDL  67            Passed - No abnormal creatine kinase in past 12 months    No lab results found.            Passed - Recent (12 mo) or future (30 days) visit within the authorizing provider's specialty    Patient had office visit in the last 12 months or has a visit in the next 30 days with authorizing provider or within the authorizing provider's specialty.  See \"Patient Info\" tab in inbasket, or \"Choose Columns\" in Meds & Orders section of the refill encounter.           Passed - Patient is age 18 or older       Passed - No active pregnancy on record       Passed - No positive pregnancy test in past 12 months          "

## 2018-05-02 ENCOUNTER — OFFICE VISIT (OUTPATIENT)
Dept: FAMILY MEDICINE | Facility: CLINIC | Age: 58
End: 2018-05-02
Payer: COMMERCIAL

## 2018-05-02 VITALS
BODY MASS INDEX: 35.65 KG/M2 | SYSTOLIC BLOOD PRESSURE: 121 MMHG | HEIGHT: 65 IN | OXYGEN SATURATION: 95 % | WEIGHT: 214 LBS | TEMPERATURE: 98 F | HEART RATE: 86 BPM | DIASTOLIC BLOOD PRESSURE: 69 MMHG

## 2018-05-02 DIAGNOSIS — M79.672 LEFT FOOT PAIN: ICD-10-CM

## 2018-05-02 DIAGNOSIS — F43.23 ADJUSTMENT DISORDER WITH MIXED ANXIETY AND DEPRESSED MOOD: ICD-10-CM

## 2018-05-02 DIAGNOSIS — J01.80 OTHER ACUTE SINUSITIS, RECURRENCE NOT SPECIFIED: Primary | ICD-10-CM

## 2018-05-02 PROCEDURE — 99214 OFFICE O/P EST MOD 30 MIN: CPT | Performed by: FAMILY MEDICINE

## 2018-05-02 RX ORDER — DOXYCYCLINE HYCLATE 100 MG
100 TABLET ORAL 2 TIMES DAILY
Qty: 20 TABLET | Refills: 0 | Status: SHIPPED | OUTPATIENT
Start: 2018-05-02 | End: 2018-06-28

## 2018-05-02 RX ORDER — CITALOPRAM HYDROBROMIDE 40 MG/1
40 TABLET ORAL DAILY
Qty: 90 TABLET | Refills: 1 | Status: SHIPPED | OUTPATIENT
Start: 2018-05-02 | End: 2018-06-28

## 2018-05-02 RX ORDER — NABUMETONE 500 MG/1
500-1000 TABLET, FILM COATED ORAL 2 TIMES DAILY PRN
Qty: 20 TABLET | Refills: 0 | Status: SHIPPED | OUTPATIENT
Start: 2018-05-02 | End: 2018-06-28

## 2018-05-02 ASSESSMENT — ANXIETY QUESTIONNAIRES
2. NOT BEING ABLE TO STOP OR CONTROL WORRYING: NOT AT ALL
6. BECOMING EASILY ANNOYED OR IRRITABLE: NOT AT ALL
IF YOU CHECKED OFF ANY PROBLEMS ON THIS QUESTIONNAIRE, HOW DIFFICULT HAVE THESE PROBLEMS MADE IT FOR YOU TO DO YOUR WORK, TAKE CARE OF THINGS AT HOME, OR GET ALONG WITH OTHER PEOPLE: NOT DIFFICULT AT ALL
7. FEELING AFRAID AS IF SOMETHING AWFUL MIGHT HAPPEN: NOT AT ALL
5. BEING SO RESTLESS THAT IT IS HARD TO SIT STILL: NOT AT ALL
1. FEELING NERVOUS, ANXIOUS, OR ON EDGE: NOT AT ALL
3. WORRYING TOO MUCH ABOUT DIFFERENT THINGS: NOT AT ALL
GAD7 TOTAL SCORE: 0

## 2018-05-02 ASSESSMENT — PATIENT HEALTH QUESTIONNAIRE - PHQ9: 5. POOR APPETITE OR OVEREATING: NOT AT ALL

## 2018-05-02 NOTE — LETTER
My Depression Action Plan  Name: Carmen Nuñez   Date of Birth 1960  Date: 5/2/2018    My doctor: Jackelyn Sims   My clinic: [address] [address]  [phone]          GREEN    ZONE   Good Control    What it looks like:     Things are going generally well. You have normal up s and down s. You may even feel depressed from time to time, but bad moods usually last less than a day.   What you need to do:  1. Continue to care for yourself (see self care plan)  2. Check your depression survival kit and update it as needed  3. Follow your physician s recommendations including any medication.  4. Do not stop taking medication unless you consult with your physician first.           YELLOW         ZONE Getting Worse    What it looks like:     Depression is starting to interfere with your life.     It may be hard to get out of bed; you may be starting to isolate yourself from others.    Symptoms of depression are starting to last most all day and this has happened for several days.     You may have suicidal thoughts but they are not constant.   What you need to do:     1. Call your care team, your response to treatment will improve if you keep your care team informed of your progress. Yellow periods are signs an adjustment may need to be made.     2. Continue your self-care, even if you have to fake it!    3. Talk to someone in your support network    4. Open up your depression survival kit           RED    ZONE Medical Alert - Get Help    What it looks like:     Depression is seriously interfering with your life.     You may experience these or other symptoms: You can t get out of bed most days, can t work or engage in other necessary activities, you have trouble taking care of basic hygiene, or basic responsibilities, thoughts of suicide or death that will not go away, self-injurious behavior.     What you need to do:  1. Call  your care team and request a same-day appointment. If they are not available (weekends or after hours) call your local crisis line, emergency room or 911.            Depression Self Care Plan / Survival Kit    Self-Care for Depression  Here s the deal. Your body and mind are really not as separate as most people think.  What you do and think affects how you feel and how you feel influences what you do and think. This means if you do things that people who feel good do, it will help you feel better.  Sometimes this is all it takes.  There is also a place for medication and therapy depending on how severe your depression is, so be sure to consult with your medical provider and/ or Behavioral Health Consultant if your symptoms are worsening or not improving.     In order to better manage my stress, I will:    Exercise  Get some form of exercise, every day. This will help reduce pain and release endorphins, the  feel good  chemicals in your brain. This is almost as good as taking antidepressants!  This is not the same as joining a gym and then never going! (they count on that by the way ) It can be as simple as just going for a walk or doing some gardening, anything that will get you moving.      Hygiene   Maintain good hygiene (Get out of bed in the morning, Make your bed, Brush your teeth, Take a shower, and Get dressed like you were going to work, even if you are unemployed).  If your clothes don't fit try to get ones that do.    Diet  I will strive to eat foods that are good for me, drink plenty of water, and avoid excessive sugar, caffeine, alcohol, and other mood-altering substances.  Some foods that are helpful in depression are: complex carbohydrates, B vitamins, flaxseed, fish or fish oil, fresh fruits and vegetables.    Psychotherapy  I agree to participate in Individual Therapy (if recommended).    Medication  If prescribed medications, I agree to take them.  Missing doses can result in serious side effects.   I understand that drinking alcohol, or other illicit drug use, may cause potential side effects.  I will not stop my medication abruptly without first discussing it with my provider.    Staying Connected With Others  I will stay in touch with my friends, family members, and my primary care provider/team.    Use your imagination  Be creative.  We all have a creative side; it doesn t matter if it s oil painting, sand castles, or mud pies! This will also kick up the endorphins.    Witness Beauty  (AKA stop and smell the roses) Take a look outside, even in mid-winter. Notice colors, textures. Watch the squirrels and birds.     Service to others  Be of service to others.  There is always someone else in need.  By helping others we can  get out of ourselves  and remember the really important things.  This also provides opportunities for practicing all the other parts of the program.    Humor  Laugh and be silly!  Adjust your TV habits for less news and crime-drama and more comedy.    Control your stress  Try breathing deep, massage therapy, biofeedback, and meditation. Find time to relax each day.     My support system    Clinic Contact:  Phone number:    Contact 1:  Phone number:    Contact 2:  Phone number:    Sikh/:  Phone number:    Therapist:  Phone number:    [facility] crisis center:    Phone number:    Other community support:  Phone number:

## 2018-05-02 NOTE — NURSING NOTE
"Chief Complaint   Patient presents with     Recheck Medication       Initial /69  Pulse 86  Temp 98  F (36.7  C) (Tympanic)  Ht 5' 5\" (1.651 m)  Wt 214 lb (97.1 kg)  SpO2 95%  BMI 35.61 kg/m2 Estimated body mass index is 35.61 kg/(m^2) as calculated from the following:    Height as of this encounter: 5' 5\" (1.651 m).    Weight as of this encounter: 214 lb (97.1 kg).  Medication Reconciliation: complete  "

## 2018-05-02 NOTE — MR AVS SNAPSHOT
"              After Visit Summary   5/2/2018    Carmen Nuñez    MRN: [id number]           Patient Information     Date Of Birth          1960        Visit Information        Provider Department      5/2/2018 10:40 AM Jackelyn Sims MD [facility] [place]        Today's Diagnoses     Other acute sinusitis, recurrence not specified    -  1    Adjustment disorder with mixed anxiety and depressed mood        Left foot pain          Care Instructions    Take medications as directed.  Treatment and symptomatic cares discussed   Follow up if problem or concern             Follow-ups after your visit        Follow-up notes from your care team     Return if symptoms worsen or fail to improve, for Physical Exam.      Who to contact     If you have questions or need follow up information about today's clinic visit or your schedule please contact [facility] [place] directly at [phone].  Normal or non-critical lab and imaging results will be communicated to you by Unifyohart, letter or phone within 4 business days after the clinic has received the results. If you do not hear from us within 7 days, please contact the clinic through MyChart or phone. If you have a critical or abnormal lab result, we will notify you by phone as soon as possible.  Submit refill requests through Prefundia or call your pharmacy and they will forward the refill request to us. Please allow 3 business days for your refill to be completed.          Additional Information About Your Visit        MyChart Information     [employer]ia lets you send messages to your doctor, view your test results, renew your prescriptions, schedule appointments and more. To sign up, go to [email or web address] . Click on \"Log in\" on the left side of the screen, which will take you to the Welcome page. Then click on \"Sign up Now\" on the right side of the page.     You will be asked to enter the access code listed below, as well as some " "personal information. Please follow the directions to create your username and password.     Your access code is: [email or web address]  Expires: 2018 11:27 AM     Your access code will  in 90 days. If you need help or a new code, please call your [place] clinic or [phone].        Care EveryWhere ID     This is your Care EveryWhere ID. This could be used by other organizations to access your [place] medical records  [id number]        Your Vitals Were     Pulse Temperature Height Pulse Oximetry BMI (Body Mass Index)       86 98  F (36.7  C) (Tympanic) 5' 5\" (1.651 m) 95% 35.61 kg/m2        Blood Pressure from Last 3 Encounters:   18 121/69   18 126/80   18 113/72    Weight from Last 3 Encounters:   18 214 lb (97.1 kg)   18 219 lb (99.3 kg)   18 216 lb (98 kg)              We Performed the Following     DEPRESSION ACTION PLAN (DAP)          Today's Medication Changes          These changes are accurate as of 18 11:27 AM.  If you have any questions, ask your nurse or doctor.               Start taking these medicines.        Dose/Directions    doxycycline 100 MG tablet   Commonly known as:  VIBRA-TABS   Used for:  Other acute sinusitis, recurrence not specified   Started by:  Jackelyn Sims MD        Dose:  100 mg   Take 1 tablet (100 mg) by mouth 2 times daily   Quantity:  20 tablet   Refills:  0       nabumetone 500 MG tablet   Commonly known as:  RELAFEN   Used for:  Left foot pain   Started by:  Jackelyn Sims MD        Dose:  500-1000 mg   Take 1-2 tablets (500-1,000 mg) by mouth 2 times daily as needed for moderate pain   Quantity:  20 tablet   Refills:  0         These medicines have changed or have updated prescriptions.        Dose/Directions    citalopram 40 MG tablet   Commonly known as:  celeXA   This may have changed:  See the new instructions.   Used for:  Adjustment disorder with mixed anxiety and depressed mood   Changed by:  Jackelyn Sims" MD Eugene        Dose:  40 mg   Take 1 tablet (40 mg) by mouth daily   Quantity:  90 tablet   Refills:  1            Where to get your medicines      These medications were sent to [facility] [place], [place] - [address] [place] [address]  [address] [place] [address] [place] [address]     Phone:  [phone]     citalopram 40 MG tablet    doxycycline 100 MG tablet    nabumetone 500 MG tablet                Primary Care Provider Office Phone # Fax #    Jackelyn Eugene Sims [phone]       [address]  [place] [address]        Equal Access to Services     [facility]: Hadii aad ku hadasho Soomaali, waaxda luqadaha, qaybta kaalmada adeegyada, zeina salamanca . So [facility] [phone].    ATENCIÓN: Si habla español, tiene a vu disposición servicios gratuitos de asistencia lingüística. Llame al [phone].    We comply with applicable federal civil rights laws and [place] laws. We do not discriminate on the basis of race, color, national origin, age, disability, sex, sexual orientation, or gender identity.            Thank you!     [facility] you [facility]  [facility] your [facility]. Our goal is always to provide you with excellent care. Hearing back from our patients is one way we can continue to improve our services. Please take a few minutes to complete the written survey that you may receive in the mail after your visit with us. Thank you!             Your Updated Medication List - Protect others around you: Learn how to safely use, store and throw away your medicines at www.disposemymeds.org.          This list is accurate as of 5/2/18 11:27 AM.  Always use your most recent med list.                   Brand Name Dispense Instructions for use Diagnosis    amitriptyline 10 MG tablet    ELAVIL    90 tablet    Take 2 tablets by mouth At Bedtime.        aspirin 81 MG tablet      Take 81 mg by mouth daily        atorvastatin 20 MG tablet     LIPITOR    30 tablet    TAKE ONE TABLET BY MOUTH EVERY DAY    Hyperlipidemia LDL goal <70       calcium 500 MG Chew      1 per day        citalopram 40 MG tablet    celeXA    90 tablet    Take 1 tablet (40 mg) by mouth daily    Adjustment disorder with mixed anxiety and depressed mood       doxycycline 100 MG tablet    VIBRA-TABS    20 tablet    Take 1 tablet (100 mg) by mouth 2 times daily    Other acute sinusitis, recurrence not specified       metoprolol succinate 50 MG 24 hr tablet    TOPROL-XL    90 tablet    TAKE ONE TABLET BY MOUTH EVERY DAY    Coronary artery disease involving other coronary artery bypass graft with other forms of angina pectoris (H)       MULTIVITAMIN TABS   OR      one tablet daily        nabumetone 500 MG tablet    RELAFEN    20 tablet    Take 1-2 tablets (500-1,000 mg) by mouth 2 times daily as needed for moderate pain    Left foot pain       nitroGLYcerin 0.4 MG sublingual tablet    NITROSTAT    25 tablet    Place 1 tablet (0.4 mg) under the tongue every 5 minutes as needed for chest pain    Non-ST elevation myocardial infarction, subsequent care episode (H)       OMEGA-3 FISH OIL PO           RIZATRIPTAN BENZOATE PO      Take 10 mg by mouth

## 2018-05-02 NOTE — PROGRESS NOTES
SUBJECTIVE:   Carmen Nuñez is a 58 year old female who presents to clinic today for the following health issues:      Medication Followup of  Atorvastatin, Metoprolol     Taking Medication as prescribed: yes    Side Effects:  None    Medication Helping Symptoms:  yes     RESPIRATORY SYMPTOMS      Duration: x Monday     Description  nasal congestion, rhinorrhea, facial pain/pressure, cough, fatigue/malaise, myalgias and coughing up dark brown phlegm , no fever but feeling chilled and warm     Severity: moderate, not as sharp now but still hurts . Denies any previous foot problem     Accompanying signs and symptoms: None    History (predisposing factors):  none    Precipitating or alleviating factors: None    Therapies tried and outcome:  OTC NSAID    Musculoskeletal problem/pain      Duration: x Monday .  No recall of any acute injury s although she first felt the pain she was just walking and felt sharp pain lasted for 10 seconds , she retied  the shoes etc to see it at could be bothering her. Although pain improved but did  continued rest of that day and still not going away, so she was concerned.  She has changed shoes in the last few weeks although problem.  Denies any previous problems with this foot    Description  Location: left foot     Intensity:  Moderate , huts worse on walking but hurts sitting sometimes too , although it is getting better     Accompanying signs and symptoms: no swelling, redness fever or chills     History  Previous similar problem: no   Previous evaluation:  none    Precipitating or alleviating factors:  Trauma or overuse: no   Aggravating factors include: standing, walking and climbing stairs    Therapies tried and outcome: NSAID - ibuprofen       Depression and Anxiety Follow-Up    Status since last visit: No change    Other associated symptoms:See phq-9 and jeramy 7    Complicating factors:     Significant life event: No     Current substance abuse: None    PHQ-9 4/6/2017  11/3/2017 5/2/2018   Total Score 3 1 3   Q9: Suicide Ideation Not at all Not at all Not at all     NATE-7 SCORE 8/4/2016 4/6/2017 5/2/2018   Total Score - - -   Total Score 0 0 0       PHQ-9  English  PHQ-9   Any Language  NATE-7  Suicide Assessment Five-step Evaluation and Treatment (SAFE-T)    Problem list and histories reviewed & adjusted, as indicated.  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, in remission     Allergic rhinitis due to other allergen     Acute gastritis     Carpal tunnel syndrome     Obesity     Hyperlipidemia LDL goal <70     Non-ST elevation myocardial infarction, subsequent care episode     Impingement syndrome of right shoulder     History of colonic polyps     Advanced directives, counseling/discussion     Coronary artery disease     Cardiomyopathy (H)     Nonspecific abnormal results of liver function study     S/P rotator cuff repair     Adjustment disorder with mixed anxiety and depressed mood     BPPV (benign paroxysmal positional vertigo), unspecified laterality     Brain aneurysm     Acute left ankle pain     Acute pain of left knee     Tricuspid valve disorders, non-rheumatic     Other constipation     Encounter for pre-operative cardiovascular clearance     Plantar wart     Past Surgical History:   Procedure Laterality Date     C APPENDECTOMY  1985    incidental     C NONSPECIFIC PROCEDURE  1985    colposcopy/cryotherapy of cervix for abnl pap smear     C NONSPECIFIC PROCEDURE  1992    clipping ruptured cerebral aneurysm     C NONSPECIFIC PROCEDURE  10/05    UGI with SBFT nl     C VAGINAL HYSTERECTOMY  04/03    partial ovaries remain     CHOLECYSTECTOMY, OPEN  1985     CORONARY ANGIOGRAPHY ADULT ORDER  1-20-11      New critical left main stenosis of 80-90% , CABG recommended     CORONARY ARTERY BYPASS  1-24-11    X3 1/11 LIMA to LAD, SVG to  "OM, SVG aorta to distal RCA     CRANIOTOMY      resection on brai aneurysm at age 31      CRANIOTOMY, REPAIR ANEURYSM, COMBINED      at age 31      HC COLONOSCOPY THRU STOMA, DIAGNOSTIC  10/05    bx of terminal ileal nodules benign     HC UGI ENDOSCOPY DIAG W OR W/O BRUSH/WASH  10/05    erosive gastritis, neg sprue bx     HEART CATH, ANGIOPLASTY  Nov 2010    intracoronary stenting in the mid LAD.       HYSTERECTOMY, PAP NO LONGER INDICATED       HYSTERECTOMY, PAP NO LONGER INDICATED       TONSILLECTOMY      tonsillectomy       Social History   Substance Use Topics     Smoking status: Former Smoker     Packs/day: 2.00     Years: 22.00     Types: Cigarettes     Quit date: 1/1/1994     Smokeless tobacco: Never Used     Alcohol use No      Comment: quit 1994     Family History   Problem Relation Age of Onset     C.A.D. Mother      HEART DISEASE Mother      heart disease     CEREBROVASCULAR DISEASE Mother      age 70s     DIABETES Mother      type 2     Hypertension Mother      Lipids Mother      Myocardial Infarction Mother      CEREBROVASCULAR DISEASE Father      age mid 70s     CANCER Father      breast     Breast Cancer Father      onset age 72     Alcohol/Drug Father      Cardiovascular Father      abdominal aortic aneurysm, smoker     Myocardial Infarction Brother      2            Reviewed and updated as needed this visit by clinical staff       Reviewed and updated as needed this visit by Provider         ROS:  Constitutional, HEENT, cardiovascular, pulmonary, GI, , musculoskeletal, neuro, skin, endocrine and psych systems are negative, except as otherwise noted.    OBJECTIVE:     /69  Pulse 86  Temp 98  F (36.7  C) (Tympanic)  Ht 5' 5\" (1.651 m)  Wt 214 lb (97.1 kg)  SpO2 95%  BMI 35.61 kg/m2  Body mass index is 35.61 kg/(m^2).  GENERAL: healthy, alert and no distress  EYES: Eyes grossly normal to inspection, PERRL and conjunctivae and sclerae normal  HENT: ear canals and TM's normal and oral " mucous membranes moist, Throat with mild pharyngeal erythema, + sinus  tenderness  NECK: no adenopathy,   RESP: lungs clear to auscultation - no rales, rhonchi or wheezes  CV: regular rate and rhythm, normal S1 S2, no S3 or S4,  MS:left foot with no obvious erythema swelling or redness, ankle and toes are with normal range of motion and does not aggravate any pain . minimal tenderness to palpation a little below second MTP along the second metatarsal , midfoot area  NEURO: Normal strength and tone, mentation intact and speech normal  PSYCH: mentation appears normal, affect normal/bright      ASSESSMENT/PLAN:         (J01.80) Other acute sinusitis, recurrence not specified  (primary encounter diagnosis)  Comment:   Plan: doxycycline (VIBRA-TABS) 100 MG tablet                 URI lingering onto sinusitis. Treat with doxycycline as this works better for her.  cares and symptomatic treatment discussed. Follow up if problem.       (M79.672) Left foot pain  Comment: mid foot, along 2nd toe likely strain versus other,  Plan: nabumetone (RELAFEN) 500 MG tablet       Discussed feet cares. Talked about comfortable shoes, orthotics to support etc. I offered her x-ray she declined , as clinically she is improving,  she is comfortable watching .  Given a prescription for Relafen to see if that would help with the pain   in the meantime. Pros/ cons of med's discussed follow up if ongoing problem. Consider PT or podiatry referral if needed. He will follow up as needed       (F43.23) Adjustment disorder with mixed anxiety and depressed mood  Comment:   Plan: citalopram (CELEXA) 40 MG tablet, DEPRESSION         ACTION PLAN (DAP)        Stable and doing well.  Continue with same medication follow-up in 6 months      Patient expressed understanding and agreement with treatment plan. All patient's questions were answered, will let me know if has more later.  Medications: Rx's: Reviewed the potential side effects/complications of  medications prescribed.       Jackelyn Sims MD  [facility] [place]

## 2018-05-03 ASSESSMENT — ANXIETY QUESTIONNAIRES: GAD7 TOTAL SCORE: 0

## 2018-05-03 ASSESSMENT — PATIENT HEALTH QUESTIONNAIRE - PHQ9: SUM OF ALL RESPONSES TO PHQ QUESTIONS 1-9: 3

## 2018-06-28 ENCOUNTER — OFFICE VISIT (OUTPATIENT)
Dept: FAMILY MEDICINE | Facility: CLINIC | Age: 58
End: 2018-06-28
Payer: COMMERCIAL

## 2018-06-28 VITALS
HEART RATE: 70 BPM | WEIGHT: 218 LBS | DIASTOLIC BLOOD PRESSURE: 75 MMHG | TEMPERATURE: 98.6 F | BODY MASS INDEX: 36.32 KG/M2 | SYSTOLIC BLOOD PRESSURE: 124 MMHG | OXYGEN SATURATION: 96 % | HEIGHT: 65 IN

## 2018-06-28 DIAGNOSIS — F43.23 ADJUSTMENT DISORDER WITH MIXED ANXIETY AND DEPRESSED MOOD: ICD-10-CM

## 2018-06-28 DIAGNOSIS — Z00.00 ROUTINE GENERAL MEDICAL EXAMINATION AT A HEALTH CARE FACILITY: Primary | ICD-10-CM

## 2018-06-28 DIAGNOSIS — Z12.39 SCREENING FOR BREAST CANCER: ICD-10-CM

## 2018-06-28 DIAGNOSIS — B37.2 CANDIDIASIS OF SKIN: ICD-10-CM

## 2018-06-28 DIAGNOSIS — E78.5 HYPERLIPIDEMIA LDL GOAL <70: ICD-10-CM

## 2018-06-28 DIAGNOSIS — I25.798 CORONARY ARTERY DISEASE INVOLVING OTHER CORONARY ARTERY BYPASS GRAFT WITH OTHER FORMS OF ANGINA PECTORIS (H): ICD-10-CM

## 2018-06-28 LAB
ALBUMIN SERPL-MCNC: 3.9 G/DL (ref 3.4–5)
ALP SERPL-CCNC: 121 U/L (ref 40–150)
ALT SERPL W P-5'-P-CCNC: 49 U/L (ref 0–50)
ANION GAP SERPL CALCULATED.3IONS-SCNC: 13 MMOL/L (ref 3–14)
AST SERPL W P-5'-P-CCNC: 32 U/L (ref 0–45)
BILIRUB SERPL-MCNC: 0.5 MG/DL (ref 0.2–1.3)
BUN SERPL-MCNC: 16 MG/DL (ref 7–30)
CALCIUM SERPL-MCNC: 9.2 MG/DL (ref 8.5–10.1)
CHLORIDE SERPL-SCNC: 107 MMOL/L (ref 94–109)
CHOLEST SERPL-MCNC: 133 MG/DL
CO2 SERPL-SCNC: 22 MMOL/L (ref 20–32)
CREAT SERPL-MCNC: 0.69 MG/DL (ref 0.52–1.04)
GFR SERPL CREATININE-BSD FRML MDRD: 87 ML/MIN/1.7M2
GLUCOSE SERPL-MCNC: 105 MG/DL (ref 70–99)
HDLC SERPL-MCNC: 37 MG/DL
LDLC SERPL CALC-MCNC: 60 MG/DL
NONHDLC SERPL-MCNC: 96 MG/DL
POTASSIUM SERPL-SCNC: 3.9 MMOL/L (ref 3.4–5.3)
PROT SERPL-MCNC: 7.9 G/DL (ref 6.8–8.8)
SODIUM SERPL-SCNC: 142 MMOL/L (ref 133–144)
TRIGL SERPL-MCNC: 178 MG/DL

## 2018-06-28 PROCEDURE — 80053 COMPREHEN METABOLIC PANEL: CPT | Performed by: FAMILY MEDICINE

## 2018-06-28 PROCEDURE — 80061 LIPID PANEL: CPT | Performed by: FAMILY MEDICINE

## 2018-06-28 PROCEDURE — 36415 COLL VENOUS BLD VENIPUNCTURE: CPT | Performed by: FAMILY MEDICINE

## 2018-06-28 PROCEDURE — 99213 OFFICE O/P EST LOW 20 MIN: CPT | Mod: 25 | Performed by: FAMILY MEDICINE

## 2018-06-28 PROCEDURE — 99396 PREV VISIT EST AGE 40-64: CPT | Performed by: FAMILY MEDICINE

## 2018-06-28 RX ORDER — METOPROLOL SUCCINATE 50 MG/1
50 TABLET, EXTENDED RELEASE ORAL DAILY
Qty: 30 TABLET | Refills: 3 | Status: SHIPPED | OUTPATIENT
Start: 2018-06-28 | End: 2018-10-19

## 2018-06-28 RX ORDER — AMITRIPTYLINE HYDROCHLORIDE 10 MG/1
20 TABLET ORAL AT BEDTIME
Qty: 60 TABLET | Refills: 3 | Status: SHIPPED | OUTPATIENT
Start: 2018-06-28

## 2018-06-28 RX ORDER — CLOTRIMAZOLE AND BETAMETHASONE DIPROPIONATE 10; .64 MG/G; MG/G
CREAM TOPICAL 2 TIMES DAILY
Qty: 30 G | Refills: 0 | Status: SHIPPED | OUTPATIENT
Start: 2018-06-28

## 2018-06-28 RX ORDER — CITALOPRAM HYDROBROMIDE 40 MG/1
40 TABLET ORAL DAILY
Qty: 90 TABLET | Refills: 0 | Status: SHIPPED | OUTPATIENT
Start: 2018-06-28 | End: 2019-03-01

## 2018-06-28 RX ORDER — ATORVASTATIN CALCIUM 20 MG/1
20 TABLET, FILM COATED ORAL DAILY
Qty: 30 TABLET | Refills: 11 | Status: SHIPPED | OUTPATIENT
Start: 2018-06-28

## 2018-06-28 ASSESSMENT — ANXIETY QUESTIONNAIRES
GAD7 TOTAL SCORE: 0
5. BEING SO RESTLESS THAT IT IS HARD TO SIT STILL: NOT AT ALL
3. WORRYING TOO MUCH ABOUT DIFFERENT THINGS: NOT AT ALL
IF YOU CHECKED OFF ANY PROBLEMS ON THIS QUESTIONNAIRE, HOW DIFFICULT HAVE THESE PROBLEMS MADE IT FOR YOU TO DO YOUR WORK, TAKE CARE OF THINGS AT HOME, OR GET ALONG WITH OTHER PEOPLE: NOT DIFFICULT AT ALL
7. FEELING AFRAID AS IF SOMETHING AWFUL MIGHT HAPPEN: NOT AT ALL
1. FEELING NERVOUS, ANXIOUS, OR ON EDGE: NOT AT ALL
6. BECOMING EASILY ANNOYED OR IRRITABLE: NOT AT ALL
2. NOT BEING ABLE TO STOP OR CONTROL WORRYING: NOT AT ALL

## 2018-06-28 ASSESSMENT — PATIENT HEALTH QUESTIONNAIRE - PHQ9: 5. POOR APPETITE OR OVEREATING: NOT AT ALL

## 2018-06-28 NOTE — PATIENT INSTRUCTIONS
Preventive Health Recommendations  Female Ages 50 - 64    Yearly exam: See your health care provider every year in order to  o Review health changes.   o Discuss preventive care.    o Review your medicines if your doctor has prescribed any.      Get a Pap test every three years (unless you have an abnormal result and your provider advises testing more often).    If you get Pap tests with HPV test, you only need to test every 5 years, unless you have an abnormal result.     You do not need a Pap test if your uterus was removed (hysterectomy) and you have not had cancer.    You should be tested each year for STDs (sexually transmitted diseases) if you're at risk.     Have a mammogram every 1 to 2 years.    Have a colonoscopy at age 50, or have a yearly FIT test (stool test). These exams screen for colon cancer.      Have a cholesterol test every 5 years, or more often if advised.    Have a diabetes test (fasting glucose) every three years. If you are at risk for diabetes, you should have this test more often.     If you are at risk for osteoporosis (brittle bone disease), think about having a bone density scan (DEXA).    Shots: Get a flu shot each year. Get a tetanus shot every 10 years.    Nutrition:     Eat at least 5 servings of fruits and vegetables each day.    Eat whole-grain bread, whole-wheat pasta and brown rice instead of white grains and rice.    Get adequate Calcium and Vitamin D.     Lifestyle    Exercise at least 150 minutes a week (30 minutes a day, 5 days a week). This will help you control your weight and prevent disease.    Limit alcohol to one drink per day.    No smoking.     Wear sunscreen to prevent skin cancer.     See your dentist every six months for an exam and cleaning.    See your eye doctor every 1 to 2 years.    Weight Management: Healthy Eating  Food is your body s fuel. You can t live without it. The key is to give your body enough nutrients and energy without eating too much. Reading  food labels can help you make healthy choices. Also, learn new eating habits to manage your weight.     All the values on the label are based on one serving. The serving size is the average portion. Remember to multiply the values on the label by the number of servings you eat.   Eat less fat  A gram of fat has almost 2.5 times the calories of a gram of protein or carbohydrates. Try to balance your food choices so that only 20% to 35% of your calories comes from total fat. This means an average of 2  to 3  grams of fat for each 100 calories you eat.  Eat more fiber  High-fiber foods are digested more slowly than low-fiber foods, so you feel full longer. Try to get at least 25 grams of fiber each day for a 2000 calorie diet. Foods high in fiber include:    Vegetables and fruits    Whole-grain or bran breads, pastas, and cereals    Legumes (beans) and peas  As you begin to eat more fiber, be sure to drink plenty of water to keep your digestive system working smoothly.  Tips  Do's and don'ts include:     Don t skip meals. This often leads to overeating later on. It s best to spread your eating throughout the day.    Eat a variety of foods, not just a few favorites.    If you find yourself eating when you re not hungry, ask yourself why. Many of us eat when we re bored, stressed, or just to be polite. Listen to your body. If you re not hungry, get busy doing something else instead of eating.    Eat slower, shooting for 20 to 30 minutes for each meal. It takes 20 minutes for your stomach to tell your brain that it s full. Slow eaters tend to eat less and are still satisfied, while fast eaters may tend to be overeaters.     Pay attention to what you eat. Don t read or watch TV during your meal.  Date Last Reviewed: 1/31/2016 2000-2017 [employer]. [address] [facility], [place] [address]. All rights reserved. This information is not intended as a substitute for professional medical care. Always follow  your healthcare professional's instructions.        Losing Weight for Heart Health  Excess weight is a major risk factor for heart disease. Losing weight has many benefits including lowering your blood pressure, improving your cholesterol level, and decreasing your risk for diseases such as diabetes and heart disease. It may help keep your arteries open so that your heart can get the oxygen-rich blood it needs. All in all, losing weight makes you healthier.     Exercise with a friend. When activity is fun, you're more likely to stick with it.   Calories and weight loss    Calories are the fuel your body burns for energy. You get the calories you need from the food you eat. For healthy weight loss, women should eat at least 1,200 calories a day, men at least 1,500.    When you eat more calories than you need, your body stores the extra calories as fat. One pound of fat equals 3,500 calories.    To lose weight, try to reduce your total calorie intake by 500 calories. To do this, eat 250 calories less each day. Add activity to burn the other 250 calories. Walking 2.5 miles burns about 250 calories. Other more intense activities can burn more calories in the time you spend doing them, such as swimming and running. It is important to understand that reducing calorie intake is much more effective at weight loss than is exercise.    Eat a variety of healthy foods to get the nutrients you need.  Tips for losing weight    Drink 8 to 10 glasses of water a day.    Don t skip meals. Instead, eat smaller portions.    Eat your meals earlier in the day.    Cut out sugary drinks such as soda and fruit juices.    Make your later meals lighter than your earlier meals.   Brisk activity is best  Brisk activity gets your heart pumping faster and it makes it healthier. It s also a great way to burn calories. In fact, your body may keep burning calories for hours after you stop a brisk activity:    Begin by walking 10 minutes most  days.    Add more time and speed to your walk. Build up as you feel able.    Aim for 3 to 4 sessions of aerobic exercise a week. Each session should last about 40 minutes and include moderate to vigorous physical activity.    The most important part of the activity is that you break a sweat. This indicates your heart is working hard enough to burn fat.  Date Last Reviewed: 6/1/2016 2000-2017 [employer]. [address] [place] [address], [place] [address]. All rights reserved. This information is not intended as a substitute for professional medical care. Always follow your healthcare professional's instructions.        Weight Management: Fact and Fiction    Knowing the truth about losing weight can help you separate what works from what doesn t. Don t be taken in by expensive weight-loss fads like pills, herbs, and special foods that promise unbelievable results. There s no magic way to lose weight. If you have questions about weight loss, ask your healthcare provider.  Fiction:  The faster I lose weight, the better.   Fact: Rapid weight loss is usually due to loss of water or muscle mass. What you re trying to get rid of is extra fat. Aim to lose a 1/2 pound to 2 pounds a week. Then you re more likely to lose fat rather than water or muscle.  Fiction:  Skipping meals will help me lose weight.   Fact: When you skip meals, you don t give your body the energy it needs to work. Hunger makes you more likely to overeat later on. It s best to spread your meals throughout the day. Eat at least three meals a day.  Fiction:  I can t start exercising until I lose weight.   Fact: The sooner you start exercising the better. Exercise helps burn more calories, tone your muscles, and keep your appetite in check. People who continue to exercise after they lose weight are more likely to keep the weight off.  Fiction:  The fewer calories I eat, the better.   Fact: This seems like it should be true, but it s not. When you eat  too few calories, your body acts as if it s on a desert island. It thinks food is scarce, so it slows down your metabolism (how fast you burn calories) to save energy. By eating too few calories, you make it harder to lose weight.  Fiction:  Once I lose weight, I can go back to living the way I did before.   Fact: Going back to your old eating habits and giving up exercise is a sure way to regain any weight you ve lost. The lifestyle changes that help you lose extra weight can also help keep it off. This is why you need to make realistic changes you can stick with.  Fiction:  Low-fat and fat-free mean low-calorie.   Fact: All foods, even fat-free ones, have calories. Eat too many calories and you ll gain weight. It s OK to treat yourself to a fat-free cookie or two. Just don t eat the whole box! A dietitian will help you figure this out, and will likely recommend that you eat three meals a day, with protein with each meal.   Date Last Reviewed: 2/4/2016 2000-2017 The [employer]. [address] [place] [id number]. All rights reserved. This information is not intended as a substitute for professional medical care. Always follow your healthcare professional's instructions.

## 2018-06-28 NOTE — PROGRESS NOTES
SUBJECTIVE:   CC: Carmen Nuñez is an 58 year old woman who presents for preventive health visit.     Healthy Habits:    Do you get at least three servings of calcium containing foods daily (dairy, green leafy vegetables, etc.)? yes    Amount of exercise or daily activities, outside of work: 0 day(s) per week    Problems taking medications regularly No    Medication side effects: No    Have you had an eye exam in the past two years? yes    Do you see a dentist twice per year? yes    Do you have sleep apnea, excessive snoring or daytime drowsiness?yes      PROBLEMS TO ADD ON...      Hyperlipidemia Follow-Up      Rate your low fat/cholesterol diet?: good    Taking statin?  Yes, no muscle aches from statin    Other lipid medications/supplements?:  none    CAD Follow-up      Outpatient blood pressures are not being checked at home.  Results are good when she has it checked at places     Low Salt Diet: no added salt    . Denies any cardiovascular sx  of chest , pain, palpitation, SOB, leg edema etc.  Tries to eat well and exercise regularly. Feels well otherwise . Due for labs and need refill on medications.  She is due for cardiology follow up and plans to schedule         Depression and Anxiety Follow-Up    Status since last visit: No change, doing well on current med's taking Celexa and amitriptyline for a long time willing to go down to see if she may not need as much medication     Other associated symptoms:See phq-9 and jeramy 7    Complicating factors: none     Significant life event: No     Current substance abuse: None    PHQ-9 4/6/2017 11/3/2017 5/2/2018   Total Score 3 1 3   Q9: Suicide Ideation Not at all Not at all Not at all     JERAMY-7 SCORE 8/4/2016 4/6/2017 5/2/2018   Total Score - - -   Total Score 0 0 0       PHQ-9  English  PHQ-9   Any Language  JERAMY-7  Suicide Assessment Five-step Evaluation and Treatment (SAFE-T)    Today's PHQ-2 Score:   PHQ-2 ( 1999 [employer]) 6/28/2018 4/6/2017   Q1: Little  interest or pleasure in doing things 0 0   Q2: Feeling down, depressed or hopeless 0 0   PHQ-2 Score 0 0       Abuse: Current or Past(Physical, Sexual or Emotional)- No  Do you feel safe in your environment - Yes    Social History   Substance Use Topics     Smoking status: Former Smoker     Packs/day: 2.00     Years: 22.00     Types: Cigarettes     Quit date: 1/1/1994     Smokeless tobacco: Never Used     Alcohol use No      Comment: quit 1994     If you drink alcohol do you typically have >3 drinks per day or >7 drinks per week? No                     Reviewed orders with patient.  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, in remission     Allergic rhinitis due to other allergen     Acute gastritis     Carpal tunnel syndrome     Obesity     Hyperlipidemia LDL goal <70     Non-ST elevation myocardial infarction, subsequent care episode     Impingement syndrome of right shoulder     History of colonic polyps     Advanced directives, counseling/discussion     Coronary artery disease     Cardiomyopathy (H)     Nonspecific abnormal results of liver function study     S/P rotator cuff repair     Adjustment disorder with mixed anxiety and depressed mood     BPPV (benign paroxysmal positional vertigo), unspecified laterality     Brain aneurysm     Acute left ankle pain     Acute pain of left knee     Tricuspid valve disorders, non-rheumatic     Other constipation     Encounter for pre-operative cardiovascular clearance     Plantar wart     Past Surgical History:   Procedure Laterality Date     C APPENDECTOMY  1985    incidental     C NONSPECIFIC PROCEDURE  1985    colposcopy/cryotherapy of cervix for abnl pap smear     C NONSPECIFIC PROCEDURE  1992    clipping ruptured cerebral aneurysm     C NONSPECIFIC PROCEDURE  10/05    UGI with SBFT nl     C  VAGINAL HYSTERECTOMY  04/03    partial ovaries remain     CHOLECYSTECTOMY, OPEN  1985     CORONARY ANGIOGRAPHY ADULT ORDER  1-20-11      New critical left main stenosis of 80-90% , CABG recommended     CORONARY ARTERY BYPASS  1-24-11    X3 1/11 LIMA to LAD, SVG to OM, SVG aorta to distal RCA     CRANIOTOMY      resection on brai aneurysm at age 31      CRANIOTOMY, REPAIR ANEURYSM, COMBINED      at age 31      HC COLONOSCOPY THRU STOMA, DIAGNOSTIC  10/05    bx of terminal ileal nodules benign     HC UGI ENDOSCOPY DIAG W OR W/O BRUSH/WASH  10/05    erosive gastritis, neg sprue bx     HEART CATH, ANGIOPLASTY  Nov 2010    intracoronary stenting in the mid LAD.       HYSTERECTOMY, PAP NO LONGER INDICATED       HYSTERECTOMY, PAP NO LONGER INDICATED       TONSILLECTOMY      tonsillectomy       Social History   Substance Use Topics     Smoking status: Former Smoker     Packs/day: 2.00     Years: 22.00     Types: Cigarettes     Quit date: 1/1/1994     Smokeless tobacco: Never Used     Alcohol use No      Comment: quit 1994     Family History   Problem Relation Age of Onset     C.A.D. Mother      HEART DISEASE Mother      heart disease     Cerebrovascular Disease Mother      age 70s     Diabetes Mother      type 2     Hypertension Mother      Lipids Mother      Myocardial Infarction Mother      Cerebrovascular Disease Father      age mid 70s     Cancer Father      breast     Breast Cancer Father      onset age 72     Alcohol/Drug Father      Cardiovascular Father      abdominal aortic aneurysm, smoker     Myocardial Infarction Brother      2            Patient over age 50, mutual decision to screen reflected in health maintenance.    Pertinent mammograms are reviewed under the imaging tab.  History of abnormal Pap smear: NO - age 30- 65 PAP every 3 years recommended  PAP / HPV 9/8/2014   PAP NIL     Reviewed and updated as needed this visit by clinical staff         Reviewed and updated as needed this visit by Provider         Past Medical History:   Diagnosis Date     Abnormal glandular Papanicolaou smear of cervix     colposcopy and cryotherapy     Acute gastritis without mention of hemorrhage 10/05    NSAID related     Acute non-ST-elevation MI following previous MI (H)      Two drug-eluting stents placed to the LAD.      Allergic rhinitis due to other allergen     molds, pets, grasses, pollen - on immunotherapy     Calculus of gallbladder without mention of cholecystitis or obstruction     cholecystectomy     Cardiomyopathy (H)      Carpal tunnel syndrome     rt s/p ortho eval and EMG     Coronary artery disease     CABG 2011: LIMA to LAD, SVG to OM, SVG to RCA, cardiac cath 2010: RAMOS x2 to LAD     Depressive disorder, not elsewhere classified      Excessive or frequent menstruation     vaginal hysterectomy, ovaries remain     Family history of malignant neoplasm of breast     father     Headache(784.0)     frontal     Hyperlipidaemia      Hypertension      Intramural leiomyoma of uterus      Iron deficiency anemia, unspecified 10/05    s/p EGD, colonoscopy, SBFT pos erosive gastritis nsaid related     Mixed hyperlipidemia      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 Ramsay     Supervision of other normal pregnancy      - vaginal     Tricuspid valve disorders, non-rheumatic     per  echo, mild-mod (1-2+) TR        ROS:  CONSTITUTIONAL: NEGATIVE for fever, chills, change in weight  INTEGUMENTARY/SKIN: NEGATIVE for worrisome moles except his redness under breast area, feels itchy sometimes.  EYES: NEGATIVE for vision changes or irritation  ENT:  NEGATIVE for ear, mouth and throat problems  RESP: NEGATIVE for significant cough or SOB  BREAST: NEGATIVE for masses, tenderness or discharge  CV: NEGATIVE for chest pain, palpitations or peripheral edema  GI: NEGATIVE for nausea, abdominal pain, heartburn, or change in bowel habits  : NEGATIVE for unusual urinary or vaginal symptoms. No vaginal bleeding.  MUSCULOSKELETAL: NEGATIVE for significant arthralgias or myalgia  NEURO: NEGATIVE for weakness, dizziness or paresthesias  PSYCHIATRIC: NEGATIVE for changes in mood or affect     OBJECTIVE:   There were no vitals taken for this visit.  EXAM:  GENERAL: healthy, alert and no distress  EYES: Eyes grossly normal to inspection, PERRL and conjunctivae and sclerae normal  HENT: ear canals and TM's normal, nose and mouth without ulcers or lesions  NECK: no adenopathy, no asymmetry, masses, or scars and thyroid normal to palpation  RESP: lungs clear to auscultation - no rales, rhonchi or wheezes  BREAST: normal without masses, tenderness or nipple discharge and no palpable axillary masses or adenopathy  CV: regular rate and rhythm, normal S1 S2, no S3 or S4, no murmur, click or rub, no peripheral edema and peripheral pulses strong  ABDOMEN: soft, nontender, no hepatosplenomegaly, no masses and bowel sounds normal   (female): deferred  MS: no gross musculoskeletal defects noted, no edema  SKIN: no suspicious mole except has area of erythema underneath left breast consistent with candidiasis  NEURO: Normal strength and tone, mentation intact and speech normal  PSYCH: mentation appears normal, affect normal/bright        ASSESSMENT/PLAN:   (Z00.00) Routine general medical examination at a health care facility  (primary encounter diagnosis)  Comment:   Plan:     (E78.5) Hyperlipidemia LDL goal <70  Comment:   Plan: Lipid panel reflex to direct LDL Fasting,         atorvastatin (LIPITOR) 20 MG tablet            (F43.23) Adjustment disorder with mixed anxiety and depressed  mood  Comment:   Plan: amitriptyline (ELAVIL) 10 MG tablet, citalopram        (CELEXA) 40 MG tablet        Discussed medication.  He is willing to try going down gradually on amitriptyline, to see if she can do without  It .  We will recheck in 3 months    (I25.798) Coronary artery disease involving other coronary artery bypass graft with other forms of angina pectoris (H)  Comment: CABG 1/24/2011: LIMA to LAD, SVG to OM, SVG to RCA, cardiac cath 2010: RAMOS x2 to LAD  Plan: metoprolol succinate (TOPROL-XL) 50 MG 24 hr         tablet, aspirin 81 MG tablet, Comprehensive         metabolic panel        Doing well,  due for labs.  Refill sent.  Follow-up in 6 months sooner if any problem.    (Z12.31) Screening for breast cancer  Comment:   Plan: *MA Screening Digital Bilateral            (B37.2) Candidiasis of skin  Comment: under left breast   Plan: clotrimazole-betamethasone (LOTRISONE) cream        Skin cares and symptomatic treatment discussed follow up if problem         (Z68.36) BMI 36.0-36.9,adult  Comment:   Plan:     (E66.01,  Z68.36) Class 2 obesity due to excess calories with serious comorbidity and body mass index (BMI) of 36.0 to 36.9 in adult  Comment:   Plan: Healthy diet and exercise reviewed.   Risks of obesity discussed.  Encourage exercise.     Check labs. refill sent.Cares and  treatment discussed follow. up if problem   Patient expressed understanding and agreement with treatment plan. All patient's questions were answered, will let me know if has more later.  Medications: Rx's: Reviewed the potential side effects/complications of medications prescribed.         COUNSELING:   Reviewed preventive health counseling, as reflected in patient instructions       Regular exercise       Healthy diet/nutrition       Vision screening       Immunizations    Consider Vaccination for: Zoster  ,  plans to get it at her pharmacy         BP Readings from Last 1 Encounters:   05/02/18 121/69     Estimated body mass  "index is 35.61 kg/(m^2) as calculated from the following:    Height as of 5/2/18: 5' 5\" (1.651 m).    Weight as of 5/2/18: 214 lb (97.1 kg).      Weight management plan: Discussed healthy diet and exercise guidelines and patient will follow up in 12 months in clinic to re-evaluate.     reports that she quit smoking about 24 years ago. Her smoking use included Cigarettes. She has a 44.00 pack-year smoking history. She has never used smokeless tobacco.      Counseling Resources:  ATP IV Guidelines  Pooled Cohorts Equation Calculator  Breast Cancer Risk Calculator  FRAX Risk Assessment  ICSI Preventive Guidelines  Dietary Guidelines for Americans, 2010  USDA's MyPlate  ASA Prophylaxis  Lung CA Screening    Jackelyn Sims MD  [facility]    "

## 2018-06-28 NOTE — MR AVS SNAPSHOT
After Visit Summary   6/28/2018    Carmen Nuñez    MRN: [id number]           Patient Information     Date Of Birth          1960        Visit Information        Provider Department      6/28/2018 9:00 AM Jackelyn Sims MD [facility]        Today's Diagnoses     Routine general medical examination at a health care facility    -  1    Hyperlipidemia LDL goal <70        Adjustment disorder with mixed anxiety and depressed mood        Coronary artery disease involving other coronary artery bypass graft with other forms of angina pectoris (H)        Screening for breast cancer        Candidiasis of skin        BMI 36.0-36.9,adult        Class 2 obesity due to excess calories with serious comorbidity and body mass index (BMI) of 36.0 to 36.9 in adult          Care Instructions      Preventive Health Recommendations  Female Ages 50 - 64    Yearly exam: See your health care provider every year in order to  o Review health changes.   o Discuss preventive care.    o Review your medicines if your doctor has prescribed any.      Get a Pap test every three years (unless you have an abnormal result and your provider advises testing more often).    If you get Pap tests with HPV test, you only need to test every 5 years, unless you have an abnormal result.     You do not need a Pap test if your uterus was removed (hysterectomy) and you have not had cancer.    You should be tested each year for STDs (sexually transmitted diseases) if you're at risk.     Have a mammogram every 1 to 2 years.    Have a colonoscopy at age 50, or have a yearly FIT test (stool test). These exams screen for colon cancer.      Have a cholesterol test every 5 years, or more often if advised.    Have a diabetes test (fasting glucose) every three years. If you are at risk for diabetes, you should have this test more often.     If you are at risk for osteoporosis (brittle bone disease), think about having a  bone density scan (DEXA).    Shots: Get a flu shot each year. Get a tetanus shot every 10 years.    Nutrition:     Eat at least 5 servings of fruits and vegetables each day.    Eat whole-grain bread, whole-wheat pasta and brown rice instead of white grains and rice.    Get adequate Calcium and Vitamin D.     Lifestyle    Exercise at least 150 minutes a week (30 minutes a day, 5 days a week). This will help you control your weight and prevent disease.    Limit alcohol to one drink per day.    No smoking.     Wear sunscreen to prevent skin cancer.     See your dentist every six months for an exam and cleaning.    See your eye doctor every 1 to 2 years.    Weight Management: Healthy Eating  Food is your body s fuel. You can t live without it. The key is to give your body enough nutrients and energy without eating too much. Reading food labels can help you make healthy choices. Also, learn new eating habits to manage your weight.     All the values on the label are based on one serving. The serving size is the average portion. Remember to multiply the values on the label by the number of servings you eat.   Eat less fat  A gram of fat has almost 2.5 times the calories of a gram of protein or carbohydrates. Try to balance your food choices so that only 20% to 35% of your calories comes from total fat. This means an average of 2  to 3  grams of fat for each 100 calories you eat.  Eat more fiber  High-fiber foods are digested more slowly than low-fiber foods, so you feel full longer. Try to get at least 25 grams of fiber each day for a 2000 calorie diet. Foods high in fiber include:    Vegetables and fruits    Whole-grain or bran breads, pastas, and cereals    Legumes (beans) and peas  As you begin to eat more fiber, be sure to drink plenty of water to keep your digestive system working smoothly.  Tips  Do's and don'ts include:     Don t skip meals. This often leads to overeating later on. It s best to spread your eating  throughout the day.    Eat a variety of foods, not just a few favorites.    If you find yourself eating when you re not hungry, ask yourself why. Many of us eat when we re bored, stressed, or just to be polite. Listen to your body. If you re not hungry, get busy doing something else instead of eating.    Eat slower, shooting for 20 to 30 minutes for each meal. It takes 20 minutes for your stomach to tell your brain that it s full. Slow eaters tend to eat less and are still satisfied, while fast eaters may tend to be overeaters.     Pay attention to what you eat. Don t read or watch TV during your meal.  Date Last Reviewed: 1/31/2016 2000-2017 [employer]. [address] [place] [address], [place] [address]. All rights reserved. This information is not intended as a substitute for professional medical care. Always follow your healthcare professional's instructions.        Losing Weight for Heart Health  Excess weight is a major risk factor for heart disease. Losing weight has many benefits including lowering your blood pressure, improving your cholesterol level, and decreasing your risk for diseases such as diabetes and heart disease. It may help keep your arteries open so that your heart can get the oxygen-rich blood it needs. All in all, losing weight makes you healthier.     Exercise with a friend. When activity is fun, you're more likely to stick with it.   Calories and weight loss    Calories are the fuel your body burns for energy. You get the calories you need from the food you eat. For healthy weight loss, women should eat at least 1,200 calories a day, men at least 1,500.    When you eat more calories than you need, your body stores the extra calories as fat. One pound of fat equals 3,500 calories.    To lose weight, try to reduce your total calorie intake by 500 calories. To do this, eat 250 calories less each day. Add activity to burn the other 250 calories. Walking 2.5 miles burns about 250  calories. Other more intense activities can burn more calories in the time you spend doing them, such as swimming and running. It is important to understand that reducing calorie intake is much more effective at weight loss than is exercise.    Eat a variety of healthy foods to get the nutrients you need.  Tips for losing weight    Drink 8 to 10 glasses of water a day.    Don t skip meals. Instead, eat smaller portions.    Eat your meals earlier in the day.    Cut out sugary drinks such as soda and fruit juices.    Make your later meals lighter than your earlier meals.   Brisk activity is best  Brisk activity gets your heart pumping faster and it makes it healthier. It s also a great way to burn calories. In fact, your body may keep burning calories for hours after you stop a brisk activity:    Begin by walking 10 minutes most days.    Add more time and speed to your walk. Build up as you feel able.    Aim for 3 to 4 sessions of aerobic exercise a week. Each session should last about 40 minutes and include moderate to vigorous physical activity.    The most important part of the activity is that you break a sweat. This indicates your heart is working hard enough to burn fat.  Date Last Reviewed: 6/1/2016 2000-2017 [employer]. [address] [place] [address]. All rights reserved. This information is not intended as a substitute for professional medical care. Always follow your healthcare professional's instructions.        Weight Management: Fact and Fiction    Knowing the truth about losing weight can help you separate what works from what doesn t. Don t be taken in by expensive weight-loss fads like pills, herbs, and special foods that promise unbelievable results. There s no magic way to lose weight. If you have questions about weight loss, ask your healthcare provider.  Fiction:  The faster I lose weight, the better.   Fact: Rapid weight loss is usually due to loss of water or muscle  mass. What you re trying to get rid of is extra fat. Aim to lose a 1/2 pound to 2 pounds a week. Then you re more likely to lose fat rather than water or muscle.  Fiction:  Skipping meals will help me lose weight.   Fact: When you skip meals, you don t give your body the energy it needs to work. Hunger makes you more likely to overeat later on. It s best to spread your meals throughout the day. Eat at least three meals a day.  Fiction:  I can t start exercising until I lose weight.   Fact: The sooner you start exercising the better. Exercise helps burn more calories, tone your muscles, and keep your appetite in check. People who continue to exercise after they lose weight are more likely to keep the weight off.  Fiction:  The fewer calories I eat, the better.   Fact: This seems like it should be true, but it s not. When you eat too few calories, your body acts as if it s on a desert island. It thinks food is scarce, so it slows down your metabolism (how fast you burn calories) to save energy. By eating too few calories, you make it harder to lose weight.  Fiction:  Once I lose weight, I can go back to living the way I did before.   Fact: Going back to your old eating habits and giving up exercise is a sure way to regain any weight you ve lost. The lifestyle changes that help you lose extra weight can also help keep it off. This is why you need to make realistic changes you can stick with.  Fiction:  Low-fat and fat-free mean low-calorie.   Fact: All foods, even fat-free ones, have calories. Eat too many calories and you ll gain weight. It s OK to treat yourself to a fat-free cookie or two. Just don t eat the whole box! A dietitian will help you figure this out, and will likely recommend that you eat three meals a day, with protein with each meal.   Date Last Reviewed: 2/4/2016 2000-2017 [employer]. [address] [place] [address], [place] [address]. All rights reserved. This information is not intended as a  "substitute for professional medical care. Always follow your healthcare professional's instructions.                Follow-ups after your visit        Follow-up notes from your care team     Return in about 3 months (around 2018).      Future tests that were ordered for you today     Open Future Orders        Priority Expected Expires Ordered    *MA Screening Digital Bilateral Routine  2019            Who to contact     If you have questions or need follow up information about today's clinic visit or your schedule please contact [facility] [place] directly at [phone].  Normal or non-critical lab and imaging results will be communicated to you by Tanglerhart, letter or phone within 4 business days after the clinic has received the results. If you do not hear from us within 7 days, please contact the clinic through MROt or phone. If you have a critical or abnormal lab result, we will notify you by phone as soon as possible.  Submit refill requests through [employer] or call your pharmacy and they will forward the refill request to us. Please allow 3 business days for your refill to be completed.          Additional Information About Your Visit        TanglerharIIIMOBI Information     [employer]Tools lets you send messages to your doctor, view your test results, renew your prescriptions, schedule appointments and more. To sign up, go to [email or web address] . Click on \"Log in\" on the left side of the screen, which will take you to the Welcome page. Then click on \"Sign up Now\" on the right side of the page.     You will be asked to enter the access code listed below, as well as some personal information. Please follow the directions to create your username and password.     Your access code is: [email or web address]  Expires: 2018 11:27 AM     Your access code will  in 90 days. If you need help or a new code, please call your [facility] or [phone].        Care EveryWhere ID     This is your " "Care EveryWhere ID. This could be used by other organizations to access your [place] medical records  [id number]        Your Vitals Were     Pulse Temperature Height Pulse Oximetry BMI (Body Mass Index)       70 98.6  F (37  C) (Tympanic) 5' 5\" (1.651 m) 96% 36.28 kg/m2        Blood Pressure from Last 3 Encounters:   06/28/18 124/75   05/02/18 121/69   03/14/18 126/80    Weight from Last 3 Encounters:   06/28/18 218 lb (98.9 kg)   05/02/18 214 lb (97.1 kg)   03/14/18 219 lb (99.3 kg)              We Performed the Following     Comprehensive metabolic panel     Lipid panel reflex to direct LDL Fasting          Today's Medication Changes          These changes are accurate as of 6/28/18  9:42 AM.  If you have any questions, ask your nurse or doctor.               Start taking these medicines.        Dose/Directions    clotrimazole-betamethasone cream   Commonly known as:  LOTRISONE   Used for:  Candidiasis of skin   Started by:  Jackelyn Sims MD        Apply topically 2 times daily   Quantity:  30 g   Refills:  0         These medicines have changed or have updated prescriptions.        Dose/Directions    atorvastatin 20 MG tablet   Commonly known as:  LIPITOR   This may have changed:  See the new instructions.   Used for:  Hyperlipidemia LDL goal <70   Changed by:  Jackelyn Sims MD        Dose:  20 mg   Take 1 tablet (20 mg) by mouth daily   Quantity:  30 tablet   Refills:  11       metoprolol succinate 50 MG 24 hr tablet   Commonly known as:  TOPROL-XL   This may have changed:  See the new instructions.   Used for:  Coronary artery disease involving other coronary artery bypass graft with other forms of angina pectoris (H)   Changed by:  Jackelyn Sims MD        Dose:  50 mg   Take 1 tablet (50 mg) by mouth daily   Quantity:  30 tablet   Refills:  3            Where to get your medicines      These medications were sent to [facility] [address] [place] [address]  [address] [address]     Phone:  [phone]     amitriptyline 10 MG tablet    atorvastatin 20 MG tablet    citalopram 40 MG tablet    clotrimazole-betamethasone cream    metoprolol succinate 50 MG 24 hr tablet         Some of these will need a paper prescription and others can be bought over the counter.  Ask your nurse if you have questions.     You don't need a prescription for these medications     aspirin 81 MG tablet                Primary Care Provider Office Phone # Fax #    Jackelyn Eugene Sims [phone]       [address]  [place] [address]        Equal Access to Services     [facility] campusTWILA : Hadii pam lord hadasho Soarmand, waaxda luqadaha, qaybta kaalmada adeflakitayada, zeina raman. So [facility] [phone].    ATENCIÓN: Si habla español, tiene a vu disposición servicios gratuitos de asistencia lingüística. Ll[facility]ult[facility] [phone].    We comply with applicable federal civil rights laws and [place] laws. We do not discriminate on the basis of race, color, national origin, age, disability, sex, sexual orientation, or gender identity.            Thank you!     Thank you for choosing [facility] [place]  for your care. Our goal is always to provide you with excellent care. Hearing back from our patients is one way we can continue to improve our services. Please take a few minutes to complete the written survey that you may receive in the mail after your visit with us. Thank you!             Your Updated Medication List - Protect others around you: Learn how to safely use, store and throw away your medicines at www.disposemymeds.org.          This list is accurate as of 6/28/18  9:42 AM.  Always use your most recent med list.                   Brand Name Dispense Instructions for use Diagnosis    amitriptyline 10 MG tablet    ELAVIL    60 tablet    Take 2 tablets (20 mg) by mouth At Bedtime    Adjustment disorder with mixed anxiety  and depressed mood       aspirin 81 MG tablet     30 tablet    Take 1 tablet (81 mg) by mouth daily    Coronary artery disease involving other coronary artery bypass graft with other forms of angina pectoris (H)       atorvastatin 20 MG tablet    LIPITOR    30 tablet    Take 1 tablet (20 mg) by mouth daily    Hyperlipidemia LDL goal <70       calcium 500 MG Chew      1 per day        citalopram 40 MG tablet    celeXA    90 tablet    Take 1 tablet (40 mg) by mouth daily    Adjustment disorder with mixed anxiety and depressed mood       clotrimazole-betamethasone cream    LOTRISONE    30 g    Apply topically 2 times daily    Candidiasis of skin       metoprolol succinate 50 MG 24 hr tablet    TOPROL-XL    30 tablet    Take 1 tablet (50 mg) by mouth daily    Coronary artery disease involving other coronary artery bypass graft with other forms of angina pectoris (H)       MULTIVITAMIN TABS   OR      one tablet daily        nitroGLYcerin 0.4 MG sublingual tablet    NITROSTAT    25 tablet    Place 1 tablet (0.4 mg) under the tongue every 5 minutes as needed for chest pain    Non-ST elevation myocardial infarction, subsequent care episode (H)       OMEGA-3 FISH OIL PO           RIZATRIPTAN BENZOATE PO      Take 10 mg by mouth

## 2018-06-29 ASSESSMENT — PATIENT HEALTH QUESTIONNAIRE - PHQ9: SUM OF ALL RESPONSES TO PHQ QUESTIONS 1-9: 3

## 2018-06-29 ASSESSMENT — ANXIETY QUESTIONNAIRES: GAD7 TOTAL SCORE: 0

## 2018-07-26 ENCOUNTER — OFFICE VISIT (OUTPATIENT)
Dept: FAMILY MEDICINE | Facility: CLINIC | Age: 58
End: 2018-07-26
Payer: COMMERCIAL

## 2018-07-26 VITALS
OXYGEN SATURATION: 97 % | TEMPERATURE: 96.7 F | HEART RATE: 66 BPM | SYSTOLIC BLOOD PRESSURE: 134 MMHG | WEIGHT: 218 LBS | DIASTOLIC BLOOD PRESSURE: 80 MMHG | BODY MASS INDEX: 36.28 KG/M2

## 2018-07-26 DIAGNOSIS — H81.10 BENIGN PAROXYSMAL POSITIONAL VERTIGO, UNSPECIFIED LATERALITY: Primary | ICD-10-CM

## 2018-07-26 DIAGNOSIS — E66.01 MORBID OBESITY (H): ICD-10-CM

## 2018-07-26 PROCEDURE — 99213 OFFICE O/P EST LOW 20 MIN: CPT | Performed by: FAMILY MEDICINE

## 2018-07-26 RX ORDER — MECLIZINE HYDROCHLORIDE 25 MG/1
25 TABLET ORAL EVERY 6 HOURS PRN
Qty: 30 TABLET | Refills: 0 | Status: SHIPPED | OUTPATIENT
Start: 2018-07-26 | End: 2018-08-27

## 2018-07-26 NOTE — PROGRESS NOTES
SUBJECTIVE:   Carmen Nuñez is a 58 year old female who presents to clinic today for the following health issues:      Dizziness      Duration: 1 week    Description   Feeling faint:  no   Feeling like the surroundings are moving: YES  Loss of consciousness or falls: no     Intensity:  moderate    Accompanying signs and symptoms:   Nausea/vomitting: no   Palpitations: no   Weakness in arms or legs: no   Vision or speech changes: no   Ringing in ears (Tinnitus): no   Hearing loss related to dizziness: no   Other (fevers/chills/sweating/dyspnea): no     History (similar episodes/head trauma/previous evaluation/recent bleeding): his tory of veritgo    Precipitating or alleviating factors (new meds/chemicals): None  Worse with activity/head movement: YES    Therapies tried and outcome: None          Problem list and histories reviewed & adjusted, as indicated.  Additional history: as documented      Reviewed and updated as needed this visit by clinical staff  Tobacco  Allergies  Meds       Reviewed and updated as needed this visit by Provider         ROS:  CONSTITUTIONAL: NEGATIVE for fever, chills, change in weight  ENT/MOUTH: NEGATIVE for ear, mouth and throat problems  RESP: NEGATIVE for significant cough or SOB  CV: NEGATIVE for chest pain, palpitations or peripheral edema  NEURO: POSITIVE for dizziness    OBJECTIVE:                                                    /80  Pulse 66  Temp 96.7  F (35.9  C) (Tympanic)  Wt 218 lb (98.9 kg)  SpO2 97%  BMI 36.28 kg/m2  Body mass index is 36.28 kg/(m^2).   GENERAL: healthy, alert, well nourished, well hydrated, no distress  NECK: no tenderness, no adenopathy, no asymmetry, no masses, no stiffness; thyroid- normal to palpation  RESP: lungs clear to auscultation - no rales, no rhonchi, no wheezes  CV: regular rates and rhythm, normal S1 S2, no S3 or S4 and no murmur, no click or rub -  Change in position cause dizziness no nystagmus.      ASSESSMENT/PLAN:                                                        ICD-10-CM    1. Benign paroxysmal positional vertigo, unspecified laterality H81.10 meclizine (ANTIVERT) 25 MG tablet   2. Morbid obesity (H) E66.01        Patient symptoms most likely secondary to benign positional vertigo.  I discussed some exercises to do at home on a regular basis to improve the symptoms.  Meanwhile she can try meclizine for symptomatic relief.  Increase hydration.  Follow-up if symptoms are not getting better.    Jefry Real MD  [facility]

## 2018-07-26 NOTE — MR AVS SNAPSHOT
After Visit Summary   7/26/2018    Carmen Nuñez    MRN: [id number]           Patient Information     Date Of Birth          1960        Visit Information        Provider Department      7/26/2018 11:40 AM Jefry Real MD [facility] [place]        Today's Diagnoses     Benign paroxysmal positional vertigo, unspecified laterality    -  1    Morbid obesity (H)           Follow-ups after your visit        Follow-up notes from your care team     Return in about 1 week (around 8/2/2018) for if symptom does not get better.      Who to contact     If you have questions or need follow up information about today's clinic visit or your schedule please contact [facility] [place] directly at [phone].  Normal or non-critical lab and imaging results will be communicated to you by MyChart, letter or phone within 4 business days after the clinic has received the results. If you do not hear from us within 7 days, please contact the clinic through MyChart or phone. If you have a critical or abnormal lab result, we will notify you by phone as soon as possible.  Submit refill requests through [employer] or call your pharmacy and they will forward the refill request to us. Please allow 3 business days for your refill to be completed.          Additional Information About Your Visit        Care EveryWhere ID     This is your Care EveryWhere ID. This could be used by other organizations to access your [place] medical records  [id number]        Your Vitals Were     Pulse Temperature Pulse Oximetry BMI (Body Mass Index)          66 96.7  F (35.9  C) (Tympanic) 97% 36.28 kg/m2         Blood Pressure from Last 3 Encounters:   07/26/18 134/80   06/28/18 124/75   05/02/18 121/69    Weight from Last 3 Encounters:   07/26/18 218 lb (98.9 kg)   06/28/18 218 lb (98.9 kg)   05/02/18 214 lb (97.1 kg)              Today, you had the following     No orders found for display         Today's  Medication Changes          These changes are accurate as of 7/26/18 12:05 PM.  If you have any questions, ask your nurse or doctor.               Start taking these medicines.        Dose/Directions    meclizine 25 MG tablet   Commonly known as:  ANTIVERT   Used for:  Benign paroxysmal positional vertigo, unspecified laterality   Started by:  Jefry Real MD        Dose:  25 mg   Take 1 tablet (25 mg) by mouth every 6 hours as needed for dizziness   Quantity:  30 tablet   Refills:  0         These medicines have changed or have updated prescriptions.        Dose/Directions    amitriptyline 10 MG tablet   Commonly known as:  ELAVIL   This may have changed:  how much to take   Used for:  Adjustment disorder with mixed anxiety and depressed mood        Dose:  20 mg   Take 2 tablets (20 mg) by mouth At Bedtime   Quantity:  60 tablet   Refills:  3            Where to get your medicines      These medications were sent to [facility] [place] [address]  [address] [facility], [place] [address]     Phone:  [phone]     meclizine 25 MG tablet                Primary Care Provider Office Phone # Fax #    Jackelyn Sims [phone]       [address] [place] [address] [address]  [place] [address]        Equal Access to Services     KANCHAN FERMIN AH: Hadii pam ku hadasho Soomaali, waaxda luqadaha, qaybta kaalmada adeegyada, waxay barbrain hayluisn sumi raman. So [facility] [place] [phone].    ATENCIÓN: Si habla español, tiene a vu disposición servicios gratuitos de asistencia lingüística. Llame al [phone].    We comply with applicable federal civil rights laws and [place] laws. We do not discriminate on the basis of race, color, national origin, age, disability, sex, sexual orientation, or gender identity.            Thank you!     Thank you for choosing [facility] [place]  for your care. Our goal is always to provide you with excellent care. Hearing  back from our patients is one way we can continue to improve our services. Please take a few minutes to complete the written survey that you may receive in the mail after your visit with us. Thank you!             Your Updated Medication List - Protect others around you: Learn how to safely use, store and throw away your medicines at www.disposemymeds.org.          This list is accurate as of 7/26/18 12:05 PM.  Always use your most recent med list.                   Brand Name Dispense Instructions for use Diagnosis    amitriptyline 10 MG tablet    ELAVIL    60 tablet    Take 2 tablets (20 mg) by mouth At Bedtime    Adjustment disorder with mixed anxiety and depressed mood       aspirin 81 MG tablet     30 tablet    Take 1 tablet (81 mg) by mouth daily    Coronary artery disease involving other coronary artery bypass graft with other forms of angina pectoris (H)       atorvastatin 20 MG tablet    LIPITOR    30 tablet    Take 1 tablet (20 mg) by mouth daily    Hyperlipidemia LDL goal <70       calcium 500 MG Chew      1 per day        citalopram 40 MG tablet    celeXA    90 tablet    Take 1 tablet (40 mg) by mouth daily    Adjustment disorder with mixed anxiety and depressed mood       clotrimazole-betamethasone cream    LOTRISONE    30 g    Apply topically 2 times daily    Candidiasis of skin       meclizine 25 MG tablet    ANTIVERT    30 tablet    Take 1 tablet (25 mg) by mouth every 6 hours as needed for dizziness    Benign paroxysmal positional vertigo, unspecified laterality       metoprolol succinate 50 MG 24 hr tablet    TOPROL-XL    30 tablet    Take 1 tablet (50 mg) by mouth daily    Coronary artery disease involving other coronary artery bypass graft with other forms of angina pectoris (H)       MULTIVITAMIN TABS   OR      one tablet daily        nitroGLYcerin 0.4 MG sublingual tablet    NITROSTAT    25 tablet    Place 1 tablet (0.4 mg) under the tongue every 5 minutes as needed for chest pain    Non-ST  elevation myocardial infarction, subsequent care episode (H)       OMEGA-3 FISH OIL PO           RIZATRIPTAN BENZOATE PO      Take 10 mg by mouth

## 2018-08-10 ENCOUNTER — RADIANT APPOINTMENT (OUTPATIENT)
Dept: GENERAL RADIOLOGY | Facility: CLINIC | Age: 58
End: 2018-08-10
Attending: FAMILY MEDICINE
Payer: COMMERCIAL

## 2018-08-10 ENCOUNTER — OFFICE VISIT (OUTPATIENT)
Dept: ORTHOPEDICS | Facility: CLINIC | Age: 58
End: 2018-08-10
Payer: COMMERCIAL

## 2018-08-10 VITALS
WEIGHT: 218 LBS | BODY MASS INDEX: 36.32 KG/M2 | HEIGHT: 65 IN | SYSTOLIC BLOOD PRESSURE: 108 MMHG | DIASTOLIC BLOOD PRESSURE: 74 MMHG

## 2018-08-10 DIAGNOSIS — M25.562 ARTHRALGIA OF LEFT LOWER LEG: ICD-10-CM

## 2018-08-10 DIAGNOSIS — M17.12 PRIMARY LOCALIZED OSTEOARTHROSIS OF LEFT LOWER LEG: Primary | ICD-10-CM

## 2018-08-10 DIAGNOSIS — M19.072 OSTEOARTHRITIS OF LEFT ANKLE AND FOOT: ICD-10-CM

## 2018-08-10 PROCEDURE — 2894A XR ANKLE LT G/E 3 VW: CPT | Mod: LT | Performed by: FAMILY MEDICINE

## 2018-08-10 PROCEDURE — 73562 X-RAY EXAM OF KNEE 3: CPT | Mod: FY | Performed by: FAMILY MEDICINE

## 2018-08-10 PROCEDURE — 73610 X-RAY EXAM OF ANKLE: CPT | Mod: LT

## 2018-08-10 PROCEDURE — 99203 OFFICE O/P NEW LOW 30 MIN: CPT | Performed by: FAMILY MEDICINE

## 2018-08-10 NOTE — MR AVS SNAPSHOT
After Visit Summary   8/10/2018    Carmen Nuñez    MRN: [id number]           Patient Information     Date Of Birth          1960        Visit Information        Provider Department      8/10/2018 2:20 PM Issac Chase MD [facility]        Today's Diagnoses     Primary localized osteoarthrosis of left lower leg    -  1    Osteoarthritis of left ankle and foot        Arthralgia of left lower leg           Follow-ups after your visit        Your next 10 appointments already scheduled     Aug 16, 2018  2:40 PM CDT   Office Visit with Mariposa Pandey MD   [facility] ([facility]    [address] [place] [id number] [phone]           Bring a current list of meds and any records pertaining to this visit. For Physicals, please bring immunization records and any forms needing to be filled out. Please arrive 10 minutes early to complete paperwork.              Who to contact     If you have questions or need follow up information about today's clinic visit or your schedule please contact [facility] directly at [phone].  Normal or non-critical lab and imaging results will be communicated to you by MyChart, letter or phone within 4 business days after the clinic has received the results. If you do not hear from us within 7 days, please contact the clinic through MyChart or phone. If you have a critical or abnormal lab result, we will notify you by phone as soon as possible.  Submit refill requests through sfilatinot or call your pharmacy and they will forward the refill request to us. Please allow 3 business days for your refill to be completed.          Additional Information About Your Visit        Care EveryWhere ID     This is your Care EveryWhere ID. This could be used by other organizations to access your [facility]  "records  [id number]        Your Vitals Were     Height BMI (Body Mass Index)                5' 5\" (1.651 m) 36.28 kg/m2           Blood Pressure from Last 3 Encounters:   08/10/18 108/74   07/26/18 134/80   06/28/18 124/75    Weight from Last 3 Encounters:   08/10/18 218 lb (98.9 kg)   07/26/18 218 lb (98.9 kg)   06/28/18 218 lb (98.9 kg)                 Today's Medication Changes          These changes are accurate as of 8/10/18  3:10 PM.  If you have any questions, ask your nurse or doctor.               These medicines have changed or have updated prescriptions.        Dose/Directions    amitriptyline 10 MG tablet   Commonly known as:  ELAVIL   This may have changed:  how much to take   Used for:  Adjustment disorder with mixed anxiety and depressed mood        Dose:  20 mg   Take 2 tablets (20 mg) by mouth At Bedtime   Quantity:  60 tablet   Refills:  3                Primary Care Provider Office Phone # Fax #    Jackelyn Eugene Sims [phone]       [address]  [place] [address]        Equal Access to Services     [facility] AH: Hadii pam lord hadasho [place]ali, waaxda luqadaha, qaybta kaalmada adeegyada, zeina salamanca ah. So [facility] [phone].    ATENCIÓN: Si habla español, tiene a vu disposición servicios gratuitos de asistencia lingüística. [facility] [phone].    We comply with applicable federal civil rights laws and [place] laws. We do not discriminate on the basis of race, color, national origin, age, disability, sex, sexual orientation, or gender identity.            Thank you!     Thank you for choosing [facility] [place]  for your care. Our goal is always to provide you with excellent care. Hearing back from our patients is one way we can continue to improve our services. Please take a few minutes to complete the written survey that you may receive in the mail after your visit with us. Thank you!           "   Your Updated Medication List - Protect others around you: Learn how to safely use, store and throw away your medicines at www.disposemymeds.org.          This list is accurate as of 8/10/18  3:10 PM.  Always use your most recent med list.                   Brand Name Dispense Instructions for use Diagnosis    amitriptyline 10 MG tablet    ELAVIL    60 tablet    Take 2 tablets (20 mg) by mouth At Bedtime    Adjustment disorder with mixed anxiety and depressed mood       aspirin 81 MG tablet     30 tablet    Take 1 tablet (81 mg) by mouth daily    Coronary artery disease involving other coronary artery bypass graft with other forms of angina pectoris (H)       atorvastatin 20 MG tablet    LIPITOR    30 tablet    Take 1 tablet (20 mg) by mouth daily    Hyperlipidemia LDL goal <70       calcium 500 MG Chew      1 per day        citalopram 40 MG tablet    celeXA    90 tablet    Take 1 tablet (40 mg) by mouth daily    Adjustment disorder with mixed anxiety and depressed mood       clotrimazole-betamethasone cream    LOTRISONE    30 g    Apply topically 2 times daily    Candidiasis of skin       meclizine 25 MG tablet    ANTIVERT    30 tablet    Take 1 tablet (25 mg) by mouth every 6 hours as needed for dizziness    Benign paroxysmal positional vertigo, unspecified laterality       metoprolol succinate 50 MG 24 hr tablet    TOPROL-XL    30 tablet    Take 1 tablet (50 mg) by mouth daily    Coronary artery disease involving other coronary artery bypass graft with other forms of angina pectoris (H)       MULTIVITAMIN TABS   OR      one tablet daily        nitroGLYcerin 0.4 MG sublingual tablet    NITROSTAT    25 tablet    Place 1 tablet (0.4 mg) under the tongue every 5 minutes as needed for chest pain    Non-ST elevation myocardial infarction, subsequent care episode (H)       OMEGA-3 FISH OIL PO           RIZATRIPTAN BENZOATE PO      Take 10 mg by mouth

## 2018-08-10 NOTE — LETTER
8/10/2018         RE: Carmen Nuñez  [address]  [place] [address]        Dear Colleague,    Thank you for referring your patient, Carmen Nuñez, to the [facility] [place]. Please see a copy of my visit note below.    HPI     [facility]   Clinic Visit s Aug 10, 2018    PCP: Jackelyn Sims      Carmen is a 58 year old female who is seen as self referral for   Chief Complaint   Patient presents with     Left Knee - Pain     Left Foot - Pain       Injury: Reports insidious onset without acute precipitating event.       Location of Pain: left knee anterior , nonradiating   Duration of Pain: 1 week(s)  Rating of Pain at worst: 5/10  Rating of Pain Currently: 5/10  Pain is better with: activity avoidance   Pain is worse with: standing and walking    Treatment so far consists of: ibuprofen  Associated symptoms: swelling Mild  Recent imaging completed: No recent imaging completed.  Prior History of related problems: knee pain in the past    Location of Pain: left ankle lateral, nonradiating   Duration of Pain: 1 week(s)  Rating of Pain at worst: 5/10  Rating of Pain Currently: 5/10  Pain is better with: activity avoidance   Pain is worse with: standing and walking    Treatment so far consists of: ice and ibuprofen  Associated symptoms: swelling Mild  Recent imaging completed: No recent imaging completed.  Prior History of related problems: none    Social History: is employed as a/an [employer]      Past Medical History:   Diagnosis Date     Abnormal glandular Papanicolaou smear of cervix 1985    colposcopy and cryotherapy     Acute gastritis without mention of hemorrhage 10/05    NSAID related     Acute non-ST-elevation MI following previous MI (H)      Two drug-eluting stents placed to the LAD.      Allergic rhinitis due to other allergen     molds, pets, grasses, pollen - on immunotherapy     Calculus of gallbladder without mention of  cholecystitis or obstruction     cholecystectomy     Cardiomyopathy (H)      Carpal tunnel syndrome     rt s/p ortho eval and EMG     Coronary artery disease     CABG 2011: LIMA to LAD, SVG to OM, SVG to RCA, cardiac cath : RAMOS x2 to LAD     Depressive disorder, not elsewhere classified      Excessive or frequent menstruation     vaginal hysterectomy, ovaries remain     Family history of malignant neoplasm of breast     father     Headache(784.0)     frontal     Hyperlipidaemia      Hypertension      Intramural leiomyoma of uterus      Iron deficiency anemia, unspecified 10/05    s/p EGD, colonoscopy, SBFT pos erosive gastritis nsaid related     Mixed hyperlipidemia      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 Ramsay     Supervision of other normal pregnancy      - vaginal     Tricuspid valve disorders, non-rheumatic     per  echo, mild-mod (1-2+) TR       Patient Active Problem List    Diagnosis Date Noted     Morbid obesity (H) 2018     Priority: Medium     BMI 36.0-36.9,adult 2018     Priority: Medium     Plantar wart 2018     Priority: Medium     left 5th toe        Encounter for pre-operative cardiovascular clearance 2018     Priority: Medium     Other constipation 2017     Priority: Medium     Tricuspid valve disorders, non-rheumatic      Priority: Medium     per  echo, mild-mod (1-2+) TR       Acute left ankle pain 10/14/2016     Priority: Medium     Acute pain of left knee 10/14/2016     Priority: Medium     BPPV (benign paroxysmal positional vertigo), unspecified laterality 2016      Priority: Medium     Brain aneurysm 03/09/2016     Priority: Medium     hx of ruptured anurysm, s/p surgery at age 31       Adjustment disorder with mixed anxiety and depressed mood 10/27/2015     Priority: Medium     S/P rotator cuff repair 01/09/2015     Priority: Medium     Nonspecific abnormal results of liver function study 01/08/2015     Priority: Medium     Coronary artery disease      Priority: Medium     CABG 1/24/2011: LIMA to LAD, SVG to OM, SVG to RCA, cardiac cath 2010: RAMOS x2 to LAD       Cardiomyopathy (H)      Priority: Medium     EF 45-50% per 2011 echo       Advanced directives, counseling/discussion 09/12/2014     Priority: Medium     Advance Care Planning:   ACP Review and Resources Provided:  Reviewed chart for advance care plan.  Carmen Rios Mallydmitry has no plan or code status on file. Mailed available resources and provided with information. Confirmed code status reflects current choices pending further ACP discussions.  Confirmed/documented designated decision maker(s). See permanent comments section of demographics in clinical tab.   Added by Wandy Gonzalez on 9/12/2014             History of colonic polyps 09/08/2014     Priority: Medium     s/p colon poly 2010       Impingement syndrome of right shoulder 06/26/2014     Priority: Medium     Hyperlipidemia LDL goal <70 11/12/2010     Priority: Medium     Non-ST elevation myocardial infarction, subsequent care episode 11/12/2010     Priority: Medium     hx of mi 2010, seeing cardiologist        Carpal tunnel syndrome      Priority: Medium     rt s/p ortho eval and EMG       Obesity      Priority: Medium     Problem list name updated by automated process. Provider to review       Acute gastritis 11/30/2005     Priority: Medium     nsaid related  Problem list name updated by automated process. Provider to review       Personal history of alcoholism (H) 04/20/2005     Priority: Medium      quit on her own, uses AA       Nondependent amphetamine  or related acting sympathomimetic abuse, in remission 04/20/2005     Priority: Medium     quit on her own, uses AA       Allergic rhinitis due to other allergen 04/20/2005     Priority: Medium     molds, pets, grasses, pollen - on immunotherapy         Family History   Problem Relation Age of Onset     C.A.D. Mother      HEART DISEASE Mother      heart disease     Cerebrovascular Disease Mother      age 70s     Diabetes Mother      type 2     Hypertension Mother      Lipids Mother      Myocardial Infarction Mother      Cerebrovascular Disease Father      age mid 70s     Cancer Father      breast     Breast Cancer Father      onset age 72     Alcohol/Drug Father      Cardiovascular Father      abdominal aortic aneurysm, smoker     Myocardial Infarction Brother      2        Social History     Social History     Marital status:      Spouse name: N/A     Number of children: 1     Years of education: 12     Occupational History      [employer]     and delivers trays to customers     Social History Main Topics     Smoking status: Former Smoker     Packs/day: 2.00     Years: 22.00     Types: Cigarettes     Quit date: 1/1/1994     Smokeless tobacco: Never Used     Alcohol use No      Comment: quit 1994       Past Surgical History:   Procedure Laterality Date     C APPENDECTOMY  1985    incidental     C NONSPECIFIC PROCEDURE  1985    colposcopy/cryotherapy of cervix for abnl pap smear     C NONSPECIFIC PROCEDURE  1992    clipping ruptured cerebral aneurysm     C NONSPECIFIC PROCEDURE  10/05    UGI with SBFT nl     C VAGINAL HYSTERECTOMY  04/03    partial ovaries remain     CHOLECYSTECTOMY, OPEN  1985     CORONARY ANGIOGRAPHY ADULT ORDER  1-20-11      New critical left main stenosis of 80-90% , CABG recommended     CORONARY ARTERY BYPASS  1-24-11    X3 1/11 LIMA to LAD, SVG to OM, SVG aorta to distal RCA     CRANIOTOMY      resection on brai aneurysm at age 31      CRANIOTOMY, REPAIR ANEURYSM, COMBINED      at age  "31      HC COLONOSCOPY THRU STOMA, DIAGNOSTIC  10/05    bx of terminal ileal nodules benign     HC UGI ENDOSCOPY DIAG W OR W/O BRUSH/WASH  10/05    erosive gastritis, neg sprue bx     HEART CATH, ANGIOPLASTY  Nov 2010    intracoronary stenting in the mid LAD.       HYSTERECTOMY, PAP NO LONGER INDICATED       HYSTERECTOMY, PAP NO LONGER INDICATED       TONSILLECTOMY      tonsillectomy           Review of Systems   Musculoskeletal: Positive for joint pain.   All other systems reviewed and are negative.        Physical Exam   Musculoskeletal:        Left knee: Medial joint line and lateral joint line tenderness noted.        Left ankle: Medial malleolus tenderness found.   /74  Ht 5' 5\" (1.651 m)  Wt 218 lb (98.9 kg)  BMI 36.28 kg/m2  Constitutional:well-developed, well-nourished, and in no distress.   Cardiovascular: Intact distal pulses.    Neurological: alert. Gait Abnormal:   slight limp  Skin: Skin is warm and dry.   Psychiatric: Mood and affect normal.   Respiratory: unlabored, speaks in full sentences  Lymph: no LAD, no lymphangitis            Left Ankle Exam   Swelling: None.    Tenderness   The patient is experiencing tenderness in the lateral malleolus, medial malleolus, ATF, and diffusely about anterior tibio-talar region.    Range of Motion   Dorsiflexion:   Plantar flexion:  Inversion:         Abnormal  Eversion:            Tests   Anterior drawer: Negative.  Varus tilt: Negative.    Comments:  Increased laxity with inversion, no pain    Left Knee Exam   Swelling: Mild  Effusion: Yes    Tenderness   The patient is experiencing tenderness in the medial joint line, lateral joint line.    Range of Motion   Extension: Normal  Flexion:     Abnormal    Tests   McMurrays:  Medial - Negative      Lateral - Negative  Drawer:       Anterior - Negative     Posterior - Negative  Varus:  Negative  Valgus: Negative  Patellar Apprehension: No          X-ray images Ordered and independently reviewed by me in the " office today with the patient. X-ray shows:   Recent Results (from the past 48 hour(s))   XR Knee Standing AP Bilat [place] Bilat Lat Left    Narrative    8/10/2018    Mild to moderate degenerative changes about the left knee with medial   compartment bony overgrowth of the femur and tibia, mild joint space   narrowing, and patellofemoral osteophyte formation particularly laterally   with patchy patellar erosions.  There is a metallic artifact in the right   medial upper soft tissues of unknown origin.   XR Ankle Left G/E 3 Views    Narrative    8/10/2018    Mild degenerative changes with trace osteophytes about the medial and   lateral gutters.  No acute fractures no soft tissue deformities.       ASSESSMENT/PLAN    ICD-10-CM    1. Primary localized osteoarthrosis of left lower leg M17.12    2. Osteoarthritis of left ankle and foot M19.072    3. Arthralgia of left lower leg M25.562 XR Knee Standing AP Bilat [place] Bilat Lat Left     XR Ankle Left G/E 3 Views     Mild to moderate knee arthritis and mild ankle arthritis.  Nature of degenerative joint disease discussed with patient.  Options discussed including observation, supportive bracing, physical therapy, cortisone injections, pain medications.  Patient opted to continue with using the personal sleeve she already has and periodic ibuprofen and cold packs.  She is able to work without restrictions currently.  Declines physical therapy or cortisone injection today.    Again, thank you for allowing me to participate in the care of your patient.        Sincerely,        Issac Chase MD

## 2018-08-10 NOTE — PROGRESS NOTES
[facility]   Clinic Visit s Aug 10, 2018    PCP: Jackelyn Sims      Carmen is a 58 year old female who is seen as self referral for   Chief Complaint   Patient presents with     Left Knee - Pain     Left Foot - Pain       Injury: Reports insidious onset without acute precipitating event.       Location of Pain: left knee anterior , nonradiating   Duration of Pain: 1 week(s)  Rating of Pain at worst: 5/10  Rating of Pain Currently: 5/10  Pain is better with: activity avoidance   Pain is worse with: standing and walking    Treatment so far consists of: ibuprofen  Associated symptoms: swelling Mild  Recent imaging completed: No recent imaging completed.  Prior History of related problems: knee pain in the past    Location of Pain: left ankle lateral, nonradiating   Duration of Pain: 1 week(s)  Rating of Pain at worst: 5/10  Rating of Pain Currently: 5/10  Pain is better with: activity avoidance   Pain is worse with: standing and walking    Treatment so far consists of: ice and ibuprofen  Associated symptoms: swelling Mild  Recent imaging completed: No recent imaging completed.  Prior History of related problems: none    Social History: is employed as a/an [employer]      Past Medical History:   Diagnosis Date     Abnormal glandular Papanicolaou smear of cervix 1985    colposcopy and cryotherapy     Acute gastritis without mention of hemorrhage 10/05    NSAID related     Acute non-ST-elevation MI following previous MI (H)      Two drug-eluting stents placed to the LAD.      Allergic rhinitis due to other allergen     molds, pets, grasses, pollen - on immunotherapy     Calculus of gallbladder without mention of cholecystitis or obstruction 1985    cholecystectomy     Cardiomyopathy (H)      Carpal tunnel syndrome 12/07    rt s/p ortho eval and EMG     Coronary artery disease     CABG 1/24/2011: LIMA to LAD, SVG to OM, SVG to RCA, cardiac cath 2010: RAMOS x2 to LAD     Depressive  disorder, not elsewhere classified      Excessive or frequent menstruation     vaginal hysterectomy, ovaries remain     Family history of malignant neoplasm of breast     father     Headache(784.0)     frontal     Hyperlipidaemia      Hypertension      Intramural leiomyoma of uterus      Iron deficiency anemia, unspecified 10/05    s/p EGD, colonoscopy, SBFT pos erosive gastritis nsaid related     Mixed hyperlipidemia      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 Ramsay     Supervision of other normal pregnancy      - vaginal     Tricuspid valve disorders, non-rheumatic     per  echo, mild-mod (1-2+) TR       Patient Active Problem List    Diagnosis Date Noted     Morbid obesity (H) 2018     Priority: Medium     BMI 36.0-36.9,adult 2018     Priority: Medium     Plantar wart 2018     Priority: Medium     left 5th toe        Encounter for pre-operative cardiovascular clearance 2018     Priority: Medium     Other constipation 2017     Priority: Medium     Tricuspid valve disorders, non-rheumatic      Priority: Medium     per  echo, mild-mod (1-2+) TR       Acute left ankle pain 10/14/2016     Priority: Medium     Acute pain of left knee 10/14/2016     Priority: Medium     BPPV (benign paroxysmal positional vertigo), unspecified laterality 2016     Priority: Medium     Brain aneurysm 2016     Priority: Medium     hx of ruptured anurysm, s/p surgery at age 31       Adjustment disorder with mixed anxiety and depressed mood 10/27/2015     Priority: Medium     S/P rotator cuff repair 2015     Priority:  Medium     Nonspecific abnormal results of liver function study 01/08/2015     Priority: Medium     Coronary artery disease      Priority: Medium     CABG 1/24/2011: LIMA to LAD, SVG to OM, SVG to RCA, cardiac cath 2010: RAMOS x2 to LAD       Cardiomyopathy (H)      Priority: Medium     EF 45-50% per 2011 echo       Advanced directives, counseling/discussion 09/12/2014     Priority: Medium     Advance Care Planning:   ACP Review and Resources Provided:  Reviewed chart for advance care plan.  Carmen Nuñez has no plan or code status on file. Mailed available resources and provided with information. Confirmed code status reflects current choices pending further ACP discussions.  Confirmed/documented designated decision maker(s). See permanent comments section of demographics in clinical tab.   Added by Wandy Gonzalez on 9/12/2014             History of colonic polyps 09/08/2014     Priority: Medium     s/p colon poly 2010       Impingement syndrome of right shoulder 06/26/2014     Priority: Medium     Hyperlipidemia LDL goal <70 11/12/2010     Priority: Medium     Non-ST elevation myocardial infarction, subsequent care episode 11/12/2010     Priority: Medium     hx of mi 2010, seeing cardiologist        Carpal tunnel syndrome      Priority: Medium     rt s/p ortho eval and EMG       Obesity      Priority: Medium     Problem list name updated by automated process. Provider to review       Acute gastritis 11/30/2005     Priority: Medium     nsaid related  Problem list name updated by automated process. 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 Mother      HEART DISEASE Mother      heart disease     Cerebrovascular Disease Mother      age 70s     Diabetes Mother      type 2     Hypertension Mother      Lipids Mother      Myocardial Infarction Mother      Cerebrovascular Disease Father      age mid 70s     Cancer Father      breast     Breast Cancer Father      onset age 72     Alcohol/Drug Father      Cardiovascular Father      abdominal aortic aneurysm, smoker     Myocardial Infarction Brother      2        Social History     Social History     Marital status:      Spouse name: N/A     Number of children: 1     Years of education: 12     Occupational History      [employer]     and delivers trays to customers     Social History Main Topics     Smoking status: Former Smoker     Packs/day: 2.00     Years: 22.00     Types: Cigarettes     Quit date: 1/1/1994     Smokeless tobacco: Never Used     Alcohol use No      Comment: quit 1994       Past Surgical History:   Procedure Laterality Date     C APPENDECTOMY  1985    incidental     C NONSPECIFIC PROCEDURE  1985    colposcopy/cryotherapy of cervix for abnl pap smear     C NONSPECIFIC PROCEDURE  1992    clipping ruptured cerebral aneurysm     C NONSPECIFIC PROCEDURE  10/05    UGI with SBFT nl     C VAGINAL HYSTERECTOMY  04/03    partial ovaries remain     CHOLECYSTECTOMY, OPEN  1985     CORONARY ANGIOGRAPHY ADULT ORDER  1-20-11      New critical left main stenosis of 80-90% , CABG recommended     CORONARY ARTERY BYPASS  1-24-11    X3 1/11 LIMA to LAD, SVG to OM, SVG aorta to distal RCA     CRANIOTOMY      resection on brai aneurysm at age 31      CRANIOTOMY, REPAIR ANEURYSM, COMBINED      at age 31      HC COLONOSCOPY THRU STOMA, DIAGNOSTIC  10/05    bx of terminal ileal nodules benign     HC UGI ENDOSCOPY DIAG W OR W/O BRUSH/WASH  10/05    erosive gastritis, neg sprue bx     HEART CATH, ANGIOPLASTY  Nov 2010    intracoronary stenting in the mid LAD.  "      HYSTERECTOMY, PAP NO LONGER INDICATED       HYSTERECTOMY, PAP NO LONGER INDICATED       TONSILLECTOMY      tonsillectomy           Review of Systems   Musculoskeletal: Positive for joint pain.   All other systems reviewed and are negative.        Physical Exam   Musculoskeletal:        Left knee: Medial joint line and lateral joint line tenderness noted.        Left ankle: Medial malleolus tenderness found.   /74  Ht 5' 5\" (1.651 m)  Wt 218 lb (98.9 kg)  BMI 36.28 kg/m2  Constitutional:well-developed, well-nourished, and in no distress.   Cardiovascular: Intact distal pulses.    Neurological: alert. Gait Abnormal:   slight limp  Skin: Skin is warm and dry.   Psychiatric: Mood and affect normal.   Respiratory: unlabored, speaks in full sentences  Lymph: no LAD, no lymphangitis            Left Ankle Exam   Swelling: None.    Tenderness   The patient is experiencing tenderness in the lateral malleolus, medial malleolus, ATF, and diffusely about anterior tibio-talar region.    Range of Motion   Dorsiflexion:   Plantar flexion:  Inversion:         Abnormal  Eversion:            Tests   Anterior drawer: Negative.  Varus tilt: Negative.    Comments:  Increased laxity with inversion, no pain    Left Knee Exam   Swelling: Mild  Effusion: Yes    Tenderness   The patient is experiencing tenderness in the medial joint line, lateral joint line.    Range of Motion   Extension: Normal  Flexion:     Abnormal    Tests   McMurrays:  Medial - Negative      Lateral - Negative  Drawer:       Anterior - Negative     Posterior - Negative  Varus:  Negative  Valgus: Negative  Patellar Apprehension: No          X-ray images Ordered and independently reviewed by me in the office today with the patient. X-ray shows:   Recent Results (from the past 48 hour(s))   XR Knee Standing AP Bilat [place] Bilat Lat Left    Narrative    8/10/2018    Mild to moderate degenerative changes about the left knee with medial   compartment bony " overgrowth of the femur and tibia, mild joint space   narrowing, and patellofemoral osteophyte formation particularly laterally   with patchy patellar erosions.  There is a metallic artifact in the right   medial upper soft tissues of unknown origin.   XR Ankle Left G/E 3 Views    Narrative    8/10/2018    Mild degenerative changes with trace osteophytes about the medial and   lateral gutters.  No acute fractures no soft tissue deformities.       ASSESSMENT/PLAN    ICD-10-CM    1. Primary localized osteoarthrosis of left lower leg M17.12    2. Osteoarthritis of left ankle and foot M19.072    3. Arthralgia of left lower leg M25.562 XR Knee Standing AP Bilat [place] Bilat Lat Left     XR Ankle Left G/E 3 Views     Mild to moderate knee arthritis and mild ankle arthritis.  Nature of degenerative joint disease discussed with patient.  Options discussed including observation, supportive bracing, physical therapy, cortisone injections, pain medications.  Patient opted to continue with using the personal sleeve she already has and periodic ibuprofen and cold packs.  She is able to work without restrictions currently.  Declines physical therapy or cortisone injection today.

## 2018-08-16 ENCOUNTER — OFFICE VISIT (OUTPATIENT)
Dept: FAMILY MEDICINE | Facility: CLINIC | Age: 58
End: 2018-08-16
Payer: COMMERCIAL

## 2018-08-16 VITALS — DIASTOLIC BLOOD PRESSURE: 72 MMHG | SYSTOLIC BLOOD PRESSURE: 110 MMHG | OXYGEN SATURATION: 98 % | HEART RATE: 98 BPM

## 2018-08-16 DIAGNOSIS — L84 CORN OR CALLUS: ICD-10-CM

## 2018-08-16 DIAGNOSIS — L30.9 ECZEMA, UNSPECIFIED TYPE: Primary | ICD-10-CM

## 2018-08-16 PROCEDURE — 99214 OFFICE O/P EST MOD 30 MIN: CPT | Performed by: FAMILY MEDICINE

## 2018-08-16 RX ORDER — TRIAMCINOLONE ACETONIDE 1 MG/G
CREAM TOPICAL 2 TIMES DAILY
Qty: 80 G | Refills: 1 | Status: SHIPPED | OUTPATIENT
Start: 2018-08-16

## 2018-08-16 NOTE — MR AVS SNAPSHOT
After Visit Summary   8/16/2018    Carmen Nuñez    MRN: [id number]           Patient Information     Date Of Birth          1960        Visit Information        Provider Department      8/16/2018 2:40 PM Mariposa Pandey MD [facility]        Today's Diagnoses     Eczema, unspecified type    -  1    Corn or callus          Care Instructions    FUTURE APPOINTMENTS  Follow up as needed.    TOPICAL STEROID INSTRUCTIONS  Triamcinolone 0.1% cream.  1. Wash hands before applying topical steroid. Use the adult fingertip unit (FTU) as a guide.    2. Apply sparingly (just enough to rub in) onto affected areas of the arms and legs (not to exceed 1 FTU per area), two times per day for 2 weeks.  3. Wash off any excess, unused topical steroid.    This higher strength steroid should never be used on face nor groin.    After the initial treatment, topical steroid may be used as needed for flare-ups but only for short-term treatment.    If you are using this for prolonged periods of time to control flare-ups, return to clinic for re-evaluation of treatment.    Keep in mind to also regularly use moisturizer, as this preventative measure can help maintain your skin's natural protective moisture barrier.    DRY SKIN MANAGEMENT INSTRUCTIONS  Routine use of moisturizer is important for healthy, resilient skin not just for soft skin.     Sealing in moisture    Twice daily use of a moisturizer such as over-the-counter (OTC) CeraVe moisturizer cream (in the jar). CeraVe products contain ceramides and filaggrin proteins that can help to maintain the body's moisture layer.    After cleansing or washing, always apply moisturizer immediately after drying off (pat dry only) for best effect.    Protection while hydrating    Do not overuse soap. Unless you have been sweating extensively, just apply soap to groin and armpits.    Recommended products for body include: OTC unscented Dove for  "sensitive skin or OTC Vanicream cleansing bar.    Recommended facial cleansers include: OTC CeraVe hydrating facial cleanser or OTC Cetaphil daily facial cleanser.    Avoid use of    Scented/perfumed products    Irritating clothing (wool, new jeans, new/unwashed clothing, scratchy synthetics)    Neosporin or triple antibiotic topical products    Products containing aloe, herbs, Vitamin E, or other \"natural ingredients\".    Dryer sheets or fabric softeners (while symptoms are present)    If a topical medication is prescribed, apply topical prescription first, followed by use of moisturizing product.      OTC WART INSTRUCTIONS - Compound W, WartStick or Wart Away    Wash the affected area (and optionally, soak in warm water for 5 minutes). Dry thoroughly.    Then, apply the OTC treatment to the warts every night for 2-3 weeks.    After application, cover with duct tape or a bandage, leaving on through the night. Remove in the morning.    Avoid getting treatment onto normal skin surrounding the wart.    Additionally, a pumice stone or clean washcloth can be used to gently remove the softened layers of the wart in between applications. Do not use this stone or cloth anywhere else, as it can spread the wart virus.    Consider use of a corn pad to the affected area to prevent recurrence of corn.          Follow-ups after your visit        Who to contact     If you have questions or need follow up information about today's clinic visit or your schedule please contact [facility] directly at [phone].  Normal or non-critical lab and imaging results will be communicated to you by WatchGuardhart, letter or phone within 4 business days after the clinic has received the results. If you do not hear from us within 7 days, please contact the clinic through WatchGuardhart or phone. If you have a critical or abnormal lab result, we will notify you by phone as soon as possible.  Submit refill requests through [employer]-Squared or call " your pharmacy and they will forward the refill request to us. Please allow 3 business days for your refill to be completed.          Additional Information About Your Visit        Care EveryWhere ID     This is your Care EveryWhere ID. This could be used by other organizations to access your [facility] medical records  [id number]        Your Vitals Were     Pulse Pulse Oximetry                98 98%           Blood Pressure from Last 3 Encounters:   08/16/18 110/72   08/10/18 108/74   07/26/18 134/80    Weight from Last 3 Encounters:   08/10/18 218 lb (98.9 kg)   07/26/18 218 lb (98.9 kg)   06/28/18 218 lb (98.9 kg)              Today, you had the following     No orders found for display         Today's Medication Changes          These changes are accurate as of 8/16/18  2:59 PM.  If you have any questions, ask your nurse or doctor.               These medicines have changed or have updated prescriptions.        Dose/Directions    amitriptyline 10 MG tablet   Commonly known as:  ELAVIL   This may have changed:  how much to take   Used for:  Adjustment disorder with mixed anxiety and depressed mood        Dose:  20 mg   Take 2 tablets (20 mg) by mouth At Bedtime   Quantity:  60 tablet   Refills:  3                Primary Care Provider Office Phone # Fax #    Jackelyn Sims [phone]       [address] [place] [facility] [place] [place] [address]        Equal Access to Services     [facility] AH: Hadii pam ramirezo Soarmand, waaxda luqadaha, qaybta kaalmada edie, zeina raman. So [facility] [phone].    ATENCIÓN: Si habla español, tiene a vu disposición servicios gratuitos de asistencia lingüística. Tennille al [phone].    We comply with applicable federal civil rights laws and [place] laws. We do not discriminate on the basis of race, color, national origin, age, disability, sex, sexual orientation, or gender identity.            Thank you!     Thank you for  choosing [facility]  for your care. Our goal is always to provide you with excellent care. Hearing back from our patients is one way we can continue to improve our services. Please take a few minutes to complete the written survey that you may receive in the mail after your visit with us. Thank you!             Your Updated Medication List - Protect others around you: Learn how to safely use, store and throw away your medicines at www.disposemymeds.org.          This list is accurate as of 8/16/18  2:59 PM.  Always use your most recent med list.                   Brand Name Dispense Instructions for use Diagnosis    amitriptyline 10 MG tablet    ELAVIL    60 tablet    Take 2 tablets (20 mg) by mouth At Bedtime    Adjustment disorder with mixed anxiety and depressed mood       aspirin 81 MG tablet     30 tablet    Take 1 tablet (81 mg) by mouth daily    Coronary artery disease involving other coronary artery bypass graft with other forms of angina pectoris (H)       atorvastatin 20 MG tablet    LIPITOR    30 tablet    Take 1 tablet (20 mg) by mouth daily    Hyperlipidemia LDL goal <70       calcium 500 MG Chew      1 per day        citalopram 40 MG tablet    celeXA    90 tablet    Take 1 tablet (40 mg) by mouth daily    Adjustment disorder with mixed anxiety and depressed mood       clotrimazole-betamethasone cream    LOTRISONE    30 g    Apply topically 2 times daily    Candidiasis of skin       meclizine 25 MG tablet    ANTIVERT    30 tablet    Take 1 tablet (25 mg) by mouth every 6 hours as needed for dizziness    Benign paroxysmal positional vertigo, unspecified laterality       metoprolol succinate 50 MG 24 hr tablet    TOPROL-XL    30 tablet    Take 1 tablet (50 mg) by mouth daily    Coronary artery disease involving other coronary artery bypass graft with other forms of angina pectoris (H)       MULTIVITAMIN TABS   OR      one tablet daily        nitroGLYcerin 0.4 MG sublingual tablet     NITROSTAT    25 tablet    Place 1 tablet (0.4 mg) under the tongue every 5 minutes as needed for chest pain    Non-ST elevation myocardial infarction, subsequent care episode (H)       OMEGA-3 FISH OIL PO           RIZATRIPTAN BENZOATE PO      Take 10 mg by mouth

## 2018-08-16 NOTE — PATIENT INSTRUCTIONS
FUTURE APPOINTMENTS  Follow up as needed.    TOPICAL STEROID INSTRUCTIONS  Triamcinolone 0.1% cream.  1. Wash hands before applying topical steroid. Use the adult fingertip unit (FTU) as a guide.    2. Apply sparingly (just enough to rub in) onto affected areas of the arms and legs (not to exceed 1 FTU per area), two times per day for 2 weeks.  3. Wash off any excess, unused topical steroid.    This higher strength steroid should never be used on face nor groin.    After the initial treatment, topical steroid may be used as needed for flare-ups but only for short-term treatment.    If you are using this for prolonged periods of time to control flare-ups, return to clinic for re-evaluation of treatment.    Keep in mind to also regularly use moisturizer, as this preventative measure can help maintain your skin's natural protective moisture barrier.    DRY SKIN MANAGEMENT INSTRUCTIONS  Routine use of moisturizer is important for healthy, resilient skin not just for soft skin.     Sealing in moisture    Twice daily use of a moisturizer such as over-the-counter (OTC) CeraVe moisturizer cream (in the jar). CeraVe products contain ceramides and filaggrin proteins that can help to maintain the body's moisture layer.    After cleansing or washing, always apply moisturizer immediately after drying off (pat dry only) for best effect.    Protection while hydrating    Do not overuse soap. Unless you have been sweating extensively, just apply soap to groin and armpits.    Recommended products for body include: OTC unscented Dove for sensitive skin or OTC Vanicream cleansing bar.    Recommended facial cleansers include: OTC CeraVe hydrating facial cleanser or OTC Cetaphil daily facial cleanser.    Avoid use of    Scented/perfumed products    Irritating clothing (wool, new jeans, new/unwashed clothing, scratchy synthetics)    Neosporin or triple antibiotic topical products    Products containing aloe, herbs, Vitamin E, or other  "\"natural ingredients\".    Dryer sheets or fabric softeners (while symptoms are present)    If a topical medication is prescribed, apply topical prescription first, followed by use of moisturizing product.      OTC WART INSTRUCTIONS - Compound W, WartStick or Wart Away    Wash the affected area (and optionally, soak in warm water for 5 minutes). Dry thoroughly.    Then, apply the OTC treatment to the warts every night for 2-3 weeks.    After application, cover with duct tape or a bandage, leaving on through the night. Remove in the morning.    Avoid getting treatment onto normal skin surrounding the wart.    Additionally, a pumice stone or clean washcloth can be used to gently remove the softened layers of the wart in between applications. Do not use this stone or cloth anywhere else, as it can spread the wart virus.    Consider use of a corn pad to the affected area to prevent recurrence of corn.  "

## 2018-08-16 NOTE — LETTER
8/16/2018         RE: Carmen Nuñez  [address]  [place] [address]        Dear Colleague,    Thank you for referring your patient, Carmen Nuñez, to the [facility]. Please see a copy of my visit note below.    [facility] - PRIMARY CARE SKIN    CC : Wart(s), rash  SUBJECTIVE:                                                    Carmen Nuñez is a 58 year old female who presents to clinic today because of warts on the left fifth toe. She has had three recurrences, and this episode began three weeks ago. She has noticed pain from the site.    Treatments tried : Tylenol, OTC wart treatments.    Issue Two : She also complains of a rash on the arms and legs, beginning on the forearms and extending on the legs as well. She has used OTC anti-itch products.    Personal Medical History  Skin Cancer : NO  Eczema Psoriasis Rosacea Autoimmune   NO NO NO NO     Family Medical History  Skin Cancer : NO  Eczema Psoriasis Rosacea Autoimmune   NO NO NO ? arthritis in sister     Refer to electronic medical record (EMR) for past medical history and medications.    INTEGUMENTARY/SKIN: POSITIVE for non-healing lesion, pruritis and rash  ROS : 14 point review of systems was negative except the symptoms listed above in the HPI.    This document serves as a record of the services and decisions personally performed and made by Naomi Padney MD. It was created on her behalf by Alejandro Vazquez, a trained medical scribe.  The creation of this document is based on the scribe's personal observations and the provider's statements to the medical scribe.  Alejandro Vazquez, August 16, 2018 2:46 PM      OBJECTIVE:                                                    GENERAL: healthy, alert and no distress  SKIN: Keyes Skin Type - II.  Arms, Legs and Feet were examined. The dermatoscope was used to help evaluate pigmented lesions.  Skin Pertinent Findings:  Left forearm, right anterior shin : excoriated  maculopapular eruption.  Few excoriated papules on left anterior shin    Left foot : 3 mm in size light brown coloration in epidermal layer most consistent with corn and underlying dried blood.     Diagnostic Test Results:  none           ASSESSMENT:                                                      Encounter Diagnoses   Name Primary?     Eczema, unspecified type Yes     Corn or callus          PLAN:                                                    Patient Instructions   FUTURE APPOINTMENTS  Follow up as needed.    TOPICAL STEROID INSTRUCTIONS  Triamcinolone 0.1% cream.  1. Wash hands before applying topical steroid. Use the adult fingertip unit (FTU) as a guide.    2. Apply sparingly (just enough to rub in) onto affected areas of the arms and legs (not to exceed 1 FTU per area), two times per day for 2 weeks.  3. Wash off any excess, unused topical steroid.    This higher strength steroid should never be used on face nor groin.    After the initial treatment, topical steroid may be used as needed for flare-ups but only for short-term treatment.    If you are using this for prolonged periods of time to control flare-ups, return to clinic for re-evaluation of treatment.    Keep in mind to also regularly use moisturizer, as this preventative measure can help maintain your skin's natural protective moisture barrier.    DRY SKIN MANAGEMENT INSTRUCTIONS  Routine use of moisturizer is important for healthy, resilient skin not just for soft skin.     Sealing in moisture    Twice daily use of a moisturizer such as over-the-counter (OTC) CeraVe moisturizer cream (in the jar). CeraVe products contain ceramides and filaggrin proteins that can help to maintain the body's moisture layer.    After cleansing or washing, always apply moisturizer immediately after drying off (pat dry only) for best effect.    Protection while hydrating    Do not overuse soap. Unless you have been sweating extensively, just apply soap to groin  "and armpits.    Recommended products for body include: OTC unscented Dove for sensitive skin or OTC Vanicream cleansing bar.    Recommended facial cleansers include: OTC CeraVe hydrating facial cleanser or OTC Cetaphil daily facial cleanser.    Avoid use of    Scented/perfumed products    Irritating clothing (wool, new jeans, new/unwashed clothing, scratchy synthetics)    Neosporin or triple antibiotic topical products    Products containing aloe, herbs, Vitamin E, or other \"natural ingredients\".    Dryer sheets or fabric softeners (while symptoms are present)    If a topical medication is prescribed, apply topical prescription first, followed by use of moisturizing product.      OTC WART INSTRUCTIONS - Compound W, WartStick or Wart Away    Wash the affected area (and optionally, soak in warm water for 5 minutes). Dry thoroughly.    Then, apply the OTC treatment to the warts every night for 2-3 weeks.    After application, cover with duct tape or a bandage, leaving on through the night. Remove in the morning.    Avoid getting treatment onto normal skin surrounding the wart.    Additionally, a pumice stone or clean washcloth can be used to gently remove the softened layers of the wart in between applications. Do not use this stone or cloth anywhere else, as it can spread the wart virus.    Consider use of a corn pad to the affected area to prevent recurrence of corn.        PROCEDURES:                                                    None.    TT : 20 minutes.  CT : 15 minutes.      The information in this document, created by the medical scribe for me, accurately reflects the services I personally performed and the decisions made by me. I have reviewed and approved this document for accuracy prior to leaving the patient care area.  Naomi Pandey MD August 16, 2018 2:46 PM  [facility]    Again, thank you for allowing me to participate in the care of your patient.        Sincerely,        Mariposa CEDENO" MD Katherin

## 2018-08-27 DIAGNOSIS — H81.10 BENIGN PAROXYSMAL POSITIONAL VERTIGO, UNSPECIFIED LATERALITY: ICD-10-CM

## 2018-08-27 NOTE — TELEPHONE ENCOUNTER
Requested Prescriptions   Pending Prescriptions Disp Refills     meclizine (ANTIVERT) 25 MG tablet  Last Written Prescription Date:  7/26/2018  Last Fill Quantity: 30 tablet,  # refills: 0   Last Office Visit: 8/16/2018   Future Office Visit:    30 tablet 0     Sig: Take 1 tablet (25 mg) by mouth every 6 hours as needed for dizziness    There is no refill protocol information for this order

## 2018-08-28 RX ORDER — MECLIZINE HYDROCHLORIDE 25 MG/1
25 TABLET ORAL EVERY 6 HOURS PRN
Qty: 90 TABLET | Refills: 1 | Status: SHIPPED | OUTPATIENT
Start: 2018-08-28 | End: 2018-11-01

## 2018-08-28 NOTE — TELEPHONE ENCOUNTER
"Requested Prescriptions   Pending Prescriptions Disp Refills     meclizine (ANTIVERT) 25 MG tablet 30 tablet 0     Sig: Take 1 tablet (25 mg) by mouth every 6 hours as needed for dizziness     Antivertigo/Antiemetic Agents Passed    8/27/2018 12:51 PM       Passed - Recent (12 mo) or future (30 days) visit within the authorizing provider's specialty    Patient had office visit in the last 12 months or has a visit in the next 30 days with authorizing provider or within the authorizing provider's specialty.  See \"Patient Info\" tab in inbasket, or \"Choose Columns\" in Meds & Orders section of the refill encounter.           Passed - Patient is 18 years of age or older        Prescription approved per [facility], [facility] or [facility] refill protocol.  Ruth Jernigan RN - [facility]      "

## 2018-10-19 DIAGNOSIS — I25.798 CORONARY ARTERY DISEASE INVOLVING OTHER CORONARY ARTERY BYPASS GRAFT WITH OTHER FORMS OF ANGINA PECTORIS (H): ICD-10-CM

## 2018-10-19 RX ORDER — METOPROLOL SUCCINATE 50 MG/1
50 TABLET, EXTENDED RELEASE ORAL DAILY
Qty: 30 TABLET | Refills: 2 | Status: SHIPPED | OUTPATIENT
Start: 2018-10-19 | End: 2019-01-16

## 2018-10-19 NOTE — TELEPHONE ENCOUNTER
"Requested Prescriptions   Pending Prescriptions Disp Refills     metoprolol succinate (TOPROL-XL) 50 MG 24 hr tablet  Last Written Prescription Date:  6-  Last Fill Quantity: 30 tablet,  # refills: 3   Last office visit: 8/16/2018 with prescribing provider:     Future Office Visit:     30 tablet 3     Sig: Take 1 tablet (50 mg) by mouth daily    Beta-Blockers Protocol Passed    10/19/2018  9:09 AM       Passed - Blood pressure under 140/90 in past 12 months    BP Readings from Last 3 Encounters:   08/16/18 110/72   08/10/18 108/74   07/26/18 134/80                Passed - Patient is age 6 or older       Passed - Recent (12 mo) or future (30 days) visit within the authorizing provider's specialty    Patient had office visit in the last 12 months or has a visit in the next 30 days with authorizing provider or within the authorizing provider's specialty.  See \"Patient Info\" tab in inbasket, or \"Choose Columns\" in Meds & Orders section of the refill encounter.                "

## 2018-10-19 NOTE — TELEPHONE ENCOUNTER
Prescription approved per [facility] Refill Protocol.    Ariela MARROQUIN RN  EP Triage

## 2018-11-01 DIAGNOSIS — H81.10 BENIGN PAROXYSMAL POSITIONAL VERTIGO, UNSPECIFIED LATERALITY: ICD-10-CM

## 2018-11-01 RX ORDER — MECLIZINE HYDROCHLORIDE 25 MG/1
25 TABLET ORAL EVERY 6 HOURS PRN
Qty: 14 TABLET | Refills: 0 | Status: SHIPPED | OUTPATIENT
Start: 2018-11-01

## 2018-11-01 NOTE — TELEPHONE ENCOUNTER
"Requested Prescriptions   Pending Prescriptions Disp Refills     meclizine (ANTIVERT) 25 MG tablet  Last Written Prescription Date:  8/28/2018  Last Fill Quantity: 90 tablet,  # refills: 1   Last office visit: 8/16/2018 with prescribing provider:  Alejo     Future Office Visit:     90 tablet 1     Sig: Take 1 tablet (25 mg) by mouth every 6 hours as needed for dizziness     Antivertigo/Antiemetic Agents Passed    11/1/2018 12:47 PM       Passed - Recent (12 mo) or future (30 days) visit within the authorizing provider's specialty    Patient had office visit in the last 12 months or has a visit in the next 30 days with authorizing provider or within the authorizing provider's specialty.  See \"Patient Info\" tab in inbasket, or \"Choose Columns\" in Meds & Orders section of the refill encounter.             Passed - Patient is 18 years of age or older          "

## 2018-11-01 NOTE — TELEPHONE ENCOUNTER
Directions state can take every 6 hours. Quantity #90. Ok for larger quantity. Aidee Clarke RN

## 2019-01-16 DIAGNOSIS — I25.798 CORONARY ARTERY DISEASE INVOLVING OTHER CORONARY ARTERY BYPASS GRAFT WITH OTHER FORMS OF ANGINA PECTORIS (H): ICD-10-CM

## 2019-01-16 RX ORDER — METOPROLOL SUCCINATE 50 MG/1
50 TABLET, EXTENDED RELEASE ORAL DAILY
Qty: 30 TABLET | Refills: 6 | Status: SHIPPED | OUTPATIENT
Start: 2019-01-16

## 2019-01-16 NOTE — TELEPHONE ENCOUNTER
Prescription approved per [facility] Refill Protocol.  Kavita COSTAN, RN   [facility]

## 2019-01-16 NOTE — TELEPHONE ENCOUNTER
"Requested Prescriptions   Pending Prescriptions Disp Refills     metoprolol succinate ER (TOPROL-XL) 50 MG 24 hr tablet  Last Written Prescription Date:  10/19/2018  Last Fill Quantity: 30 tablet,  # refills: 2   Last Office Visit: 8/16/2018   Future Office Visit:      30 tablet 2     Sig: Take 1 tablet (50 mg) by mouth daily    Beta-Blockers Protocol Passed - 1/16/2019  2:09 PM       Passed - Blood pressure under 140/90 in past 12 months    BP Readings from Last 3 Encounters:   08/16/18 110/72   08/10/18 108/74   07/26/18 134/80                Passed - Patient is age 6 or older       Passed - Recent (12 mo) or future (30 days) visit within the authorizing provider's specialty    Patient had office visit in the last 12 months or has a visit in the next 30 days with authorizing provider or within the authorizing provider's specialty.  See \"Patient Info\" tab in inbasket, or \"Choose Columns\" in Meds & Orders section of the refill encounter.             Passed - Medication is active on med list          "

## 2019-02-28 DIAGNOSIS — F43.23 ADJUSTMENT DISORDER WITH MIXED ANXIETY AND DEPRESSED MOOD: ICD-10-CM

## 2019-02-28 NOTE — TELEPHONE ENCOUNTER
"citalopram (CELEXA) 40 MG tablet    Last Written Prescription Date:  6/28/2018  Last Fill Quantity: 90,  # refills: 0   Last office visit: 6/28/2018 with prescribing provider:  yes   Future Office Visit:      Requested Prescriptions   Pending Prescriptions Disp Refills     citalopram (CELEXA) 40 MG tablet 90 tablet 0     Sig: Take 1 tablet (40 mg) by mouth daily    SSRIs Protocol Passed - 2/28/2019  2:31 PM       Passed - Recent (12 mo) or future (30 days) visit within the authorizing provider's specialty    Patient had office visit in the last 12 months or has a visit in the next 30 days with authorizing provider or within the authorizing provider's specialty.  See \"Patient Info\" tab in inbasket, or \"Choose Columns\" in Meds & Orders section of the refill encounter.             Passed - Medication is active on med list       Passed - Patient is age 18 or older       Passed - No active pregnancy on record       Passed - No positive pregnancy test in last 12 months        PHQ-9 SCORE 11/3/2017 5/2/2018 6/28/2018   PHQ-9 Total Score - - -   PHQ-9 Total Score 1 3 3     Left a non detailed message for patient to return call.     Kavita COSTAN, RN   [facility]     "

## 2019-03-01 RX ORDER — CITALOPRAM HYDROBROMIDE 40 MG/1
40 TABLET ORAL DAILY
Qty: 90 TABLET | Refills: 0 | Status: SHIPPED | OUTPATIENT
Start: 2019-03-01 | End: 2019-06-05

## 2019-03-01 ASSESSMENT — PATIENT HEALTH QUESTIONNAIRE - PHQ9: SUM OF ALL RESPONSES TO PHQ QUESTIONS 1-9: 5

## 2019-03-01 NOTE — TELEPHONE ENCOUNTER
Left a non detailed message for patient to return call.     Kavita COSTAN, RN   [facility]

## 2019-03-01 NOTE — TELEPHONE ENCOUNTER
Patient called back. PHq-9 completed. Patient stated that this medication was helping her before.       PHQ-9 SCORE 5/2/2018 6/28/2018 3/1/2019   PHQ-9 Total Score - - -   PHQ-9 Total Score 3 3 5       Routing refill request to provider for review/approval because:  Labs out of range:  PHQ-9  Break in medication     Kavita COSTAN, RN   [facility]

## 2019-04-18 NOTE — NURSING NOTE
"Chief Complaint   Patient presents with     Rectal Bleeding     Diarrhea       Initial /76  Pulse 77  Temp 98  F (36.7  C) (Tympanic)  Ht 5' 5\" (1.651 m)  Wt 215 lb (97.5 kg)  SpO2 96%  BMI 35.78 kg/m2 Estimated body mass index is 35.78 kg/(m^2) as calculated from the following:    Height as of this encounter: 5' 5\" (1.651 m).    Weight as of this encounter: 215 lb (97.5 kg).  Medication Reconciliation: complete  " negative

## 2019-06-05 DIAGNOSIS — F43.23 ADJUSTMENT DISORDER WITH MIXED ANXIETY AND DEPRESSED MOOD: ICD-10-CM

## 2019-06-05 NOTE — TELEPHONE ENCOUNTER
"Last Written Prescription Date:  3/1/19  Last Fill Quantity: 90,  # refills: 0   Last office visit: 8/16/2018 with prescribing provider:     Future Office Visit:    Requested Prescriptions   Pending Prescriptions Disp Refills     citalopram (CELEXA) 40 MG tablet [Pharmacy Med Name: CITALOPRAM HYDROBROMIDE 40MG TABS] 90 tablet 0     Sig: TAKE ONE TABLET BY MOUTH EVERY DAY       SSRIs Protocol Passed - 6/5/2019  3:57 PM        Passed - Recent (12 mo) or future (30 days) visit within the authorizing provider's specialty     Patient had office visit in the last 12 months or has a visit in the next 30 days with authorizing provider or within the authorizing provider's specialty.  See \"Patient Info\" tab in inbasket, or \"Choose Columns\" in Meds & Orders section of the refill encounter.              Passed - Medication is active on med list        Passed - Patient is age 18 or older        Passed - No active pregnancy on record        Passed - No positive pregnancy test in last 12 months          "

## 2019-06-10 RX ORDER — CITALOPRAM HYDROBROMIDE 40 MG/1
TABLET ORAL
Qty: 90 TABLET | Refills: 0 | Status: SHIPPED | OUTPATIENT
Start: 2019-06-10

## 2019-06-10 NOTE — TELEPHONE ENCOUNTER
PHQ-9 SCORE 5/2/2018 6/28/2018 3/1/2019   PHQ-9 Total Score - - -   PHQ-9 Total Score 3 3 5   failed phq9-

## 2019-09-08 DIAGNOSIS — F43.23 ADJUSTMENT DISORDER WITH MIXED ANXIETY AND DEPRESSED MOOD: ICD-10-CM

## 2019-09-09 NOTE — TELEPHONE ENCOUNTER
"Requested Prescriptions   Pending Prescriptions Disp Refills     citalopram (CELEXA) 40 MG tablet [Pharmacy Med Name: CITALOPRAM HYDROBROMIDE 40MG TABS] 90 tablet 0     Sig: TAKE ONE TABLET BY MOUTH EVERY DAY  Last Written Prescription Date:  6/10/19  Last Fill Quantity: 90,  # refills: 1   Last office visit: 8/16/2018 with prescribing provider:  Katherin   Future Office Visit:           SSRIs Protocol Failed - 9/8/2019  1:59 PM        Failed - PHQ-9 score less than 5 in past 6 months     Please review last PHQ-9 score.   PHQ-9 SCORE 5/2/2018 6/28/2018 3/1/2019   PHQ-9 Total Score - - -   PHQ-9 Total Score 3 3 5               Failed - Recent (6 mo) or future (30 days) visit within the authorizing provider's specialty     Patient had office visit in the last 6 months or has a visit in the next 30 days with authorizing provider or within the authorizing provider's specialty.  See \"Patient Info\" tab in inbasket, or \"Choose Columns\" in Meds & Orders section of the refill encounter.            Passed - Medication is active on med list        Passed - Patient is age 18 or older        Passed - No active pregnancy on record        Passed - No positive pregnancy test in last 12 months          "

## 2019-09-10 RX ORDER — CITALOPRAM HYDROBROMIDE 40 MG/1
TABLET ORAL
Qty: 90 TABLET | Refills: 0 | OUTPATIENT
Start: 2019-09-10

## 2019-09-10 NOTE — TELEPHONE ENCOUNTER
According to PCP visit on 8/8/19 with Dr. Ashwini Aguilar at [facility] in [place] pt is no longer on celexa since June 27 2019 office visit.    rx denied to pharmacy under Dr. Hickey's name since pt is being seen and followed by Dr. Ashwini Aguilar at [facility] in [place].    Ariela MARROQUIN RN  EP Triage

## 2019-09-25 DIAGNOSIS — I25.798 CORONARY ARTERY DISEASE INVOLVING OTHER CORONARY ARTERY BYPASS GRAFT WITH OTHER FORMS OF ANGINA PECTORIS (H): ICD-10-CM

## 2019-09-26 NOTE — TELEPHONE ENCOUNTER
Patient is overdue for office visit and BP check. Routing to TC to please schedule patient for an office visit, once patient is scheduled, please route back to triage to address refill.    Ceci Andre RN, BSN  [facility]

## 2019-09-30 NOTE — TELEPHONE ENCOUNTER
Komal Cardona contacted Carmen on 09/30/19 and left a message. If patient calls back please schedule appointment as soon as possible for an ov and BP check.      .Komal BURGESS    [facility] [place]

## 2019-10-02 RX ORDER — METOPROLOL SUCCINATE 50 MG/1
TABLET, EXTENDED RELEASE ORAL
Qty: 30 TABLET | Refills: 6 | OUTPATIENT
Start: 2019-10-02

## 2019-10-02 NOTE — TELEPHONE ENCOUNTER
Rx denied to pharmacy stating no longer under provider care (Dr Sims).  See note below.    Ariela MARROQUIN RN  EP Triage

## 2021-05-26 NOTE — NURSING NOTE
"Chief Complaint   Patient presents with     Cardiac Clearance     Coronary Artery Disease     Heart Problem     Hypertension     Hyperlipidemia       Initial /66  Pulse 81  Ht 1.651 m (5' 5\")  Wt 98 kg (216 lb)  SpO2 98%  BMI 35.94 kg/m2 Estimated body mass index is 35.94 kg/(m^2) as calculated from the following:    Height as of this encounter: 1.651 m (5' 5\").    Weight as of this encounter: 98 kg (216 lb).  Medication Reconciliation: complete  " negative